# Patient Record
Sex: FEMALE | Race: WHITE | NOT HISPANIC OR LATINO | Employment: OTHER | URBAN - METROPOLITAN AREA
[De-identification: names, ages, dates, MRNs, and addresses within clinical notes are randomized per-mention and may not be internally consistent; named-entity substitution may affect disease eponyms.]

---

## 2020-11-17 ENCOUNTER — HOSPITAL ENCOUNTER (OUTPATIENT)
Facility: MEDICAL CENTER | Age: 85
End: 2020-11-17
Attending: ORTHOPAEDIC SURGERY | Admitting: ORTHOPAEDIC SURGERY
Payer: MEDICARE

## 2021-01-14 DIAGNOSIS — Z23 NEED FOR VACCINATION: ICD-10-CM

## 2021-02-01 ENCOUNTER — APPOINTMENT (RX ONLY)
Dept: URBAN - METROPOLITAN AREA CLINIC 4 | Facility: CLINIC | Age: 86
Setting detail: DERMATOLOGY
End: 2021-02-01

## 2021-02-01 DIAGNOSIS — D22 MELANOCYTIC NEVI: ICD-10-CM

## 2021-02-01 DIAGNOSIS — L82.1 OTHER SEBORRHEIC KERATOSIS: ICD-10-CM

## 2021-02-01 DIAGNOSIS — L81.4 OTHER MELANIN HYPERPIGMENTATION: ICD-10-CM

## 2021-02-01 DIAGNOSIS — L91.8 OTHER HYPERTROPHIC DISORDERS OF THE SKIN: ICD-10-CM

## 2021-02-01 DIAGNOSIS — Z85.828 PERSONAL HISTORY OF OTHER MALIGNANT NEOPLASM OF SKIN: ICD-10-CM

## 2021-02-01 DIAGNOSIS — L72.0 EPIDERMAL CYST: ICD-10-CM

## 2021-02-01 PROBLEM — D22.5 MELANOCYTIC NEVI OF TRUNK: Status: ACTIVE | Noted: 2021-02-01

## 2021-02-01 PROCEDURE — 99203 OFFICE O/P NEW LOW 30 MIN: CPT

## 2021-02-01 PROCEDURE — ? LIQUID NITROGEN (COSMETIC)

## 2021-02-01 PROCEDURE — ? COUNSELING

## 2021-02-01 PROCEDURE — ? ADDITIONAL NOTES

## 2021-02-01 ASSESSMENT — LOCATION DETAILED DESCRIPTION DERM
LOCATION DETAILED: LEFT SUPERIOR MEDIAL MALAR CHEEK
LOCATION DETAILED: RIGHT INFERIOR ANTERIOR NECK
LOCATION DETAILED: RIGHT INFERIOR MEDIAL MALAR CHEEK
LOCATION DETAILED: UPPER STERNUM
LOCATION DETAILED: LEFT FOREHEAD
LOCATION DETAILED: RIGHT MEDIAL MALAR CHEEK
LOCATION DETAILED: LEFT INFERIOR MEDIAL MIDBACK
LOCATION DETAILED: LEFT PROXIMAL DORSAL FOREARM
LOCATION DETAILED: LEFT CENTRAL MALAR CHEEK
LOCATION DETAILED: LEFT INFERIOR CENTRAL MALAR CHEEK
LOCATION DETAILED: LEFT SUPERIOR MEDIAL MIDBACK
LOCATION DETAILED: NASAL SUPRATIP
LOCATION DETAILED: RIGHT PROXIMAL DORSAL FOREARM
LOCATION DETAILED: LEFT PROXIMAL POSTERIOR UPPER ARM
LOCATION DETAILED: LEFT SUPERIOR LATERAL MALAR CHEEK
LOCATION DETAILED: RIGHT INFERIOR LATERAL NECK
LOCATION DETAILED: RIGHT SUPERIOR LATERAL LOWER BACK
LOCATION DETAILED: RIGHT SUPERIOR CENTRAL MALAR CHEEK
LOCATION DETAILED: RIGHT PROXIMAL POSTERIOR UPPER ARM

## 2021-02-01 ASSESSMENT — LOCATION SIMPLE DESCRIPTION DERM
LOCATION SIMPLE: RIGHT POSTERIOR UPPER ARM
LOCATION SIMPLE: NOSE
LOCATION SIMPLE: RIGHT LOWER BACK
LOCATION SIMPLE: RIGHT ANTERIOR NECK
LOCATION SIMPLE: RIGHT FOREARM
LOCATION SIMPLE: LEFT LOWER BACK
LOCATION SIMPLE: LEFT POSTERIOR UPPER ARM
LOCATION SIMPLE: LEFT CHEEK
LOCATION SIMPLE: RIGHT CHEEK
LOCATION SIMPLE: LEFT FOREARM
LOCATION SIMPLE: LEFT FOREHEAD
LOCATION SIMPLE: CHEST

## 2021-02-01 ASSESSMENT — LOCATION ZONE DERM
LOCATION ZONE: NOSE
LOCATION ZONE: TRUNK
LOCATION ZONE: NECK
LOCATION ZONE: ARM
LOCATION ZONE: FACE

## 2021-02-01 NOTE — HPI: SKIN LESION
Is This A New Presentation, Or A Follow-Up?: Skin Lesions
What Type Of Note Output Would You Prefer (Optional)?: Standard Output
How Severe Is Your Skin Lesion?: mild
Has Your Skin Lesion Been Treated?: not been treated
Is This A New Presentation, Or A Follow-Up?: Growths

## 2021-02-01 NOTE — PROCEDURE: ADDITIONAL NOTES
Render Risk Assessment In Note?: no
Detail Level: Detailed
Additional Notes: Discussed in detail benign diagnosis. \\nReassure and observe for any changes. \\nIncludes spot of concern mentioned on intake.
Additional Notes: Includes spot of concern mentioned on intake.
Additional Notes: Will treat with LN2 today. \\nDiscussed treatment and risks in detail with patient.

## 2021-07-13 PROBLEM — G56.02 CARPAL TUNNEL SYNDROME ON LEFT: Status: ACTIVE | Noted: 2021-07-13

## 2021-07-13 PROBLEM — M65.342 TRIGGER FINGER, LEFT RING FINGER: Status: ACTIVE | Noted: 2021-07-13

## 2021-07-13 PROBLEM — M65.332 TRIGGER FINGER, LEFT MIDDLE FINGER: Status: ACTIVE | Noted: 2021-07-13

## 2021-07-27 ENCOUNTER — TELEPHONE (OUTPATIENT)
Dept: SURGERY | Facility: MEDICAL CENTER | Age: 86
End: 2021-07-27

## 2021-11-18 ENCOUNTER — TELEPHONE (OUTPATIENT)
Dept: HEALTH INFORMATION MANAGEMENT | Facility: OTHER | Age: 86
End: 2021-11-18

## 2021-11-18 NOTE — TELEPHONE ENCOUNTER
Outcome: QSHA/ and an appt to establish care with a new PCP were scheduled.    [facility] Verified: yes

## 2021-12-08 PROBLEM — M65.332 TRIGGER FINGER, LEFT MIDDLE FINGER: Status: RESOLVED | Noted: 2021-07-13 | Resolved: 2021-12-08

## 2021-12-08 PROBLEM — G47.00 INSOMNIA: Status: ACTIVE | Noted: 2021-12-08

## 2021-12-08 PROBLEM — M65.342 TRIGGER FINGER, LEFT RING FINGER: Status: RESOLVED | Noted: 2021-07-13 | Resolved: 2021-12-08

## 2021-12-08 PROBLEM — I10 HYPERTENSION: Status: ACTIVE | Noted: 2021-12-08

## 2021-12-08 PROBLEM — M35.3 POLYMYALGIA RHEUMATICA (HCC): Status: ACTIVE | Noted: 2021-12-08

## 2021-12-08 PROBLEM — K21.9 GERD (GASTROESOPHAGEAL REFLUX DISEASE): Status: ACTIVE | Noted: 2021-12-08

## 2021-12-08 PROBLEM — R94.4 ABNORMAL RESULTS OF KIDNEY FUNCTION STUDIES: Status: ACTIVE | Noted: 2021-12-08

## 2021-12-08 PROBLEM — J44.9 CHRONIC OBSTRUCTIVE PULMONARY DISEASE (COPD) (HCC): Status: ACTIVE | Noted: 2021-12-08

## 2021-12-08 PROBLEM — J44.9 CHRONIC OBSTRUCTIVE PULMONARY DISEASE (HCC): Status: ACTIVE | Noted: 2021-12-08

## 2021-12-08 PROBLEM — E66.9 OBESITY (BMI 30.0-34.9): Status: ACTIVE | Noted: 2021-12-08

## 2021-12-08 PROBLEM — I70.0 ATHEROSCLEROSIS OF AORTA (HCC): Status: ACTIVE | Noted: 2021-12-08

## 2021-12-08 PROBLEM — E66.811 OBESITY (BMI 30.0-34.9): Status: ACTIVE | Noted: 2021-12-08

## 2021-12-08 PROBLEM — G56.02 CARPAL TUNNEL SYNDROME OF LEFT WRIST: Status: RESOLVED | Noted: 2021-07-13 | Resolved: 2021-12-08

## 2021-12-08 PROBLEM — M54.9 CHRONIC BILATERAL BACK PAIN: Status: ACTIVE | Noted: 2021-12-08

## 2021-12-08 PROBLEM — G31.9 CEREBRAL ATROPHY (HCC): Status: ACTIVE | Noted: 2021-12-08

## 2021-12-08 PROBLEM — G89.29 CHRONIC BILATERAL BACK PAIN: Status: ACTIVE | Noted: 2021-12-08

## 2021-12-08 PROBLEM — F13.20 SEDATIVE HYPNOTIC OR ANXIOLYTIC DEPENDENCE (HCC): Status: ACTIVE | Noted: 2021-12-08

## 2021-12-24 ENCOUNTER — APPOINTMENT (OUTPATIENT)
Dept: RADIOLOGY | Facility: MEDICAL CENTER | Age: 86
End: 2021-12-24
Attending: EMERGENCY MEDICINE
Payer: MEDICARE

## 2021-12-24 ENCOUNTER — HOSPITAL ENCOUNTER (OUTPATIENT)
Facility: MEDICAL CENTER | Age: 86
End: 2021-12-25
Attending: EMERGENCY MEDICINE | Admitting: INTERNAL MEDICINE
Payer: MEDICARE

## 2021-12-24 DIAGNOSIS — R07.9 ACUTE CHEST PAIN: ICD-10-CM

## 2021-12-24 DIAGNOSIS — J98.4 PNEUMONITIS: ICD-10-CM

## 2021-12-24 PROBLEM — R10.13 EPIGASTRIC ABDOMINAL PAIN: Status: ACTIVE | Noted: 2021-12-24

## 2021-12-24 PROBLEM — E87.6 HYPOKALEMIA: Status: ACTIVE | Noted: 2021-12-24

## 2021-12-24 PROBLEM — D72.829 LEUKOCYTOSIS: Status: ACTIVE | Noted: 2021-12-24

## 2021-12-24 LAB
ALBUMIN SERPL BCP-MCNC: 4.4 G/DL (ref 3.2–4.9)
ALBUMIN/GLOB SERPL: 1.6 G/DL
ALP SERPL-CCNC: 84 U/L (ref 30–99)
ALT SERPL-CCNC: 24 U/L (ref 2–50)
ANION GAP SERPL CALC-SCNC: 15 MMOL/L (ref 7–16)
ANISOCYTOSIS BLD QL SMEAR: ABNORMAL
AST SERPL-CCNC: 31 U/L (ref 12–45)
BASOPHILS # BLD AUTO: 0 % (ref 0–1.8)
BASOPHILS # BLD: 0 K/UL (ref 0–0.12)
BILIRUB SERPL-MCNC: 1.2 MG/DL (ref 0.1–1.5)
BUN SERPL-MCNC: 20 MG/DL (ref 8–22)
CALCIUM SERPL-MCNC: 9.9 MG/DL (ref 8.5–10.5)
CHLORIDE SERPL-SCNC: 97 MMOL/L (ref 96–112)
CO2 SERPL-SCNC: 26 MMOL/L (ref 20–33)
CREAT SERPL-MCNC: 0.82 MG/DL (ref 0.5–1.4)
EKG IMPRESSION: NORMAL
EOSINOPHIL # BLD AUTO: 0.14 K/UL (ref 0–0.51)
EOSINOPHIL NFR BLD: 0.9 % (ref 0–6.9)
ERYTHROCYTE [DISTWIDTH] IN BLOOD BY AUTOMATED COUNT: 56.3 FL (ref 35.9–50)
GLOBULIN SER CALC-MCNC: 2.7 G/DL (ref 1.9–3.5)
GLUCOSE SERPL-MCNC: 160 MG/DL (ref 65–99)
HCT VFR BLD AUTO: 47.2 % (ref 37–47)
HGB BLD-MCNC: 16 G/DL (ref 12–16)
LIPASE SERPL-CCNC: 43 U/L (ref 11–82)
LYMPHOCYTES # BLD AUTO: 1.48 K/UL (ref 1–4.8)
LYMPHOCYTES NFR BLD: 9.6 % (ref 22–41)
MANUAL DIFF BLD: NORMAL
MCH RBC QN AUTO: 28.7 PG (ref 27–33)
MCHC RBC AUTO-ENTMCNC: 33.9 G/DL (ref 33.6–35)
MCV RBC AUTO: 84.7 FL (ref 81.4–97.8)
METAMYELOCYTES NFR BLD MANUAL: 1.7 %
MICROCYTES BLD QL SMEAR: ABNORMAL
MONOCYTES # BLD AUTO: 0.8 K/UL (ref 0–0.85)
MONOCYTES NFR BLD AUTO: 5.2 % (ref 0–13.4)
MORPHOLOGY BLD-IMP: NORMAL
NEUTROPHILS # BLD AUTO: 12.72 K/UL (ref 2–7.15)
NEUTROPHILS NFR BLD: 82.6 % (ref 44–72)
NRBC # BLD AUTO: 0 K/UL
NRBC BLD-RTO: 0 /100 WBC
PLATELET # BLD AUTO: 336 K/UL (ref 164–446)
PLATELET BLD QL SMEAR: NORMAL
PMV BLD AUTO: 9.8 FL (ref 9–12.9)
POLYCHROMASIA BLD QL SMEAR: NORMAL
POTASSIUM SERPL-SCNC: 3.4 MMOL/L (ref 3.6–5.5)
PROT SERPL-MCNC: 7.1 G/DL (ref 6–8.2)
RBC # BLD AUTO: 5.57 M/UL (ref 4.2–5.4)
RBC BLD AUTO: PRESENT
SODIUM SERPL-SCNC: 138 MMOL/L (ref 135–145)
TROPONIN T SERPL-MCNC: 22 NG/L (ref 6–19)
TROPONIN T SERPL-MCNC: 24 NG/L (ref 6–19)
WBC # BLD AUTO: 15.4 K/UL (ref 4.8–10.8)

## 2021-12-24 PROCEDURE — 94760 N-INVAS EAR/PLS OXIMETRY 1: CPT

## 2021-12-24 PROCEDURE — G0378 HOSPITAL OBSERVATION PER HR: HCPCS

## 2021-12-24 PROCEDURE — 700102 HCHG RX REV CODE 250 W/ 637 OVERRIDE(OP): Performed by: INTERNAL MEDICINE

## 2021-12-24 PROCEDURE — 83690 ASSAY OF LIPASE: CPT

## 2021-12-24 PROCEDURE — 99285 EMERGENCY DEPT VISIT HI MDM: CPT

## 2021-12-24 PROCEDURE — 80053 COMPREHEN METABOLIC PANEL: CPT

## 2021-12-24 PROCEDURE — 700111 HCHG RX REV CODE 636 W/ 250 OVERRIDE (IP): Performed by: EMERGENCY MEDICINE

## 2021-12-24 PROCEDURE — 85027 COMPLETE CBC AUTOMATED: CPT

## 2021-12-24 PROCEDURE — 93005 ELECTROCARDIOGRAM TRACING: CPT

## 2021-12-24 PROCEDURE — 96374 THER/PROPH/DIAG INJ IV PUSH: CPT

## 2021-12-24 PROCEDURE — 84484 ASSAY OF TROPONIN QUANT: CPT

## 2021-12-24 PROCEDURE — 71045 X-RAY EXAM CHEST 1 VIEW: CPT

## 2021-12-24 PROCEDURE — 93005 ELECTROCARDIOGRAM TRACING: CPT | Performed by: EMERGENCY MEDICINE

## 2021-12-24 PROCEDURE — 85007 BL SMEAR W/DIFF WBC COUNT: CPT

## 2021-12-24 PROCEDURE — 99220 PR INITIAL OBSERVATION CARE,LEVL III: CPT | Performed by: INTERNAL MEDICINE

## 2021-12-24 PROCEDURE — 96375 TX/PRO/DX INJ NEW DRUG ADDON: CPT

## 2021-12-24 PROCEDURE — A9270 NON-COVERED ITEM OR SERVICE: HCPCS | Performed by: INTERNAL MEDICINE

## 2021-12-24 PROCEDURE — 700111 HCHG RX REV CODE 636 W/ 250 OVERRIDE (IP): Performed by: INTERNAL MEDICINE

## 2021-12-24 RX ORDER — ONDANSETRON 2 MG/ML
4 INJECTION INTRAMUSCULAR; INTRAVENOUS EVERY 4 HOURS PRN
Status: DISCONTINUED | OUTPATIENT
Start: 2021-12-24 | End: 2021-12-25 | Stop reason: HOSPADM

## 2021-12-24 RX ORDER — OMEPRAZOLE 20 MG/1
40 CAPSULE, DELAYED RELEASE ORAL 2 TIMES DAILY
Status: DISCONTINUED | OUTPATIENT
Start: 2021-12-24 | End: 2021-12-25 | Stop reason: HOSPADM

## 2021-12-24 RX ORDER — ONDANSETRON 2 MG/ML
4 INJECTION INTRAMUSCULAR; INTRAVENOUS ONCE
Status: COMPLETED | OUTPATIENT
Start: 2021-12-24 | End: 2021-12-24

## 2021-12-24 RX ORDER — POTASSIUM CHLORIDE 20 MEQ/1
40 TABLET, EXTENDED RELEASE ORAL ONCE
Status: COMPLETED | OUTPATIENT
Start: 2021-12-24 | End: 2021-12-24

## 2021-12-24 RX ORDER — MORPHINE SULFATE 4 MG/ML
4 INJECTION INTRAVENOUS ONCE
Status: COMPLETED | OUTPATIENT
Start: 2021-12-24 | End: 2021-12-24

## 2021-12-24 RX ORDER — LOVASTATIN 20 MG/1
40 TABLET ORAL
Status: DISCONTINUED | OUTPATIENT
Start: 2021-12-24 | End: 2021-12-25 | Stop reason: HOSPADM

## 2021-12-24 RX ORDER — GABAPENTIN 100 MG/1
200 CAPSULE ORAL 2 TIMES DAILY
COMMUNITY

## 2021-12-24 RX ORDER — ALBUTEROL SULFATE 90 UG/1
2 AEROSOL, METERED RESPIRATORY (INHALATION)
Status: DISCONTINUED | OUTPATIENT
Start: 2021-12-24 | End: 2021-12-25 | Stop reason: HOSPADM

## 2021-12-24 RX ORDER — ONDANSETRON 4 MG/1
4 TABLET, ORALLY DISINTEGRATING ORAL EVERY 4 HOURS PRN
Status: DISCONTINUED | OUTPATIENT
Start: 2021-12-24 | End: 2021-12-25 | Stop reason: HOSPADM

## 2021-12-24 RX ORDER — GABAPENTIN 100 MG/1
200 CAPSULE ORAL 3 TIMES DAILY
Status: DISCONTINUED | OUTPATIENT
Start: 2021-12-24 | End: 2021-12-25 | Stop reason: HOSPADM

## 2021-12-24 RX ORDER — AMOXICILLIN 250 MG
2 CAPSULE ORAL 2 TIMES DAILY
Status: DISCONTINUED | OUTPATIENT
Start: 2021-12-24 | End: 2021-12-25 | Stop reason: HOSPADM

## 2021-12-24 RX ORDER — ACETAMINOPHEN 325 MG/1
650 TABLET ORAL EVERY 6 HOURS PRN
Status: DISCONTINUED | OUTPATIENT
Start: 2021-12-24 | End: 2021-12-25 | Stop reason: HOSPADM

## 2021-12-24 RX ORDER — LOSARTAN POTASSIUM 50 MG/1
50 TABLET ORAL DAILY
Status: DISCONTINUED | OUTPATIENT
Start: 2021-12-25 | End: 2021-12-25 | Stop reason: HOSPADM

## 2021-12-24 RX ORDER — POLYETHYLENE GLYCOL 3350 17 G/17G
1 POWDER, FOR SOLUTION ORAL
Status: DISCONTINUED | OUTPATIENT
Start: 2021-12-24 | End: 2021-12-25 | Stop reason: HOSPADM

## 2021-12-24 RX ORDER — PREDNISONE 10 MG/1
5 TABLET ORAL 2 TIMES DAILY
Status: DISCONTINUED | OUTPATIENT
Start: 2021-12-24 | End: 2021-12-25 | Stop reason: HOSPADM

## 2021-12-24 RX ORDER — BISACODYL 10 MG
10 SUPPOSITORY, RECTAL RECTAL
Status: DISCONTINUED | OUTPATIENT
Start: 2021-12-24 | End: 2021-12-25 | Stop reason: HOSPADM

## 2021-12-24 RX ORDER — HYDROCHLOROTHIAZIDE 25 MG/1
25 TABLET ORAL DAILY
Status: DISCONTINUED | OUTPATIENT
Start: 2021-12-25 | End: 2021-12-25 | Stop reason: HOSPADM

## 2021-12-24 RX ADMIN — PREDNISONE 5 MG: 10 TABLET ORAL at 17:42

## 2021-12-24 RX ADMIN — OMEPRAZOLE 40 MG: 20 CAPSULE, DELAYED RELEASE ORAL at 17:42

## 2021-12-24 RX ADMIN — GABAPENTIN 200 MG: 100 CAPSULE ORAL at 17:42

## 2021-12-24 RX ADMIN — ONDANSETRON 4 MG: 2 INJECTION INTRAMUSCULAR; INTRAVENOUS at 12:40

## 2021-12-24 RX ADMIN — POTASSIUM CHLORIDE 40 MEQ: 1500 TABLET, EXTENDED RELEASE ORAL at 17:43

## 2021-12-24 RX ADMIN — FAMOTIDINE 20 MG: 10 INJECTION INTRAVENOUS at 12:40

## 2021-12-24 RX ADMIN — MORPHINE SULFATE 4 MG: 4 INJECTION INTRAVENOUS at 12:41

## 2021-12-24 RX ADMIN — LOVASTATIN 40 MG: 20 TABLET ORAL at 20:55

## 2021-12-24 ASSESSMENT — PATIENT HEALTH QUESTIONNAIRE - PHQ9
1. LITTLE INTEREST OR PLEASURE IN DOING THINGS: NOT AT ALL
2. FEELING DOWN, DEPRESSED, IRRITABLE, OR HOPELESS: NOT AT ALL
SUM OF ALL RESPONSES TO PHQ9 QUESTIONS 1 AND 2: 0

## 2021-12-24 ASSESSMENT — ENCOUNTER SYMPTOMS
SHORTNESS OF BREATH: 0
LOSS OF CONSCIOUSNESS: 0
INSOMNIA: 1
EYE REDNESS: 0
FALLS: 0
FEVER: 0
MEMORY LOSS: 0
ABDOMINAL PAIN: 0
COUGH: 0
VOMITING: 1
PALPITATIONS: 0
NAUSEA: 1
SEIZURES: 0
EYE PAIN: 0
DIARRHEA: 0
CHILLS: 0

## 2021-12-24 ASSESSMENT — LIFESTYLE VARIABLES
EVER HAD A DRINK FIRST THING IN THE MORNING TO STEADY YOUR NERVES TO GET RID OF A HANGOVER: NO
TOTAL SCORE: 0
TOTAL SCORE: 0
AVERAGE NUMBER OF DAYS PER WEEK YOU HAVE A DRINK CONTAINING ALCOHOL: 0
HOW MANY TIMES IN THE PAST YEAR HAVE YOU HAD 5 OR MORE DRINKS IN A DAY: 0
EVER FELT BAD OR GUILTY ABOUT YOUR DRINKING: NO
HAVE YOU EVER FELT YOU SHOULD CUT DOWN ON YOUR DRINKING: NO
CONSUMPTION TOTAL: NEGATIVE
TOTAL SCORE: 0
HAVE PEOPLE ANNOYED YOU BY CRITICIZING YOUR DRINKING: NO
ALCOHOL_USE: NO
ON A TYPICAL DAY WHEN YOU DRINK ALCOHOL HOW MANY DRINKS DO YOU HAVE: 0

## 2021-12-24 ASSESSMENT — PAIN DESCRIPTION - PAIN TYPE
TYPE: CHRONIC PAIN
TYPE: ACUTE PAIN
TYPE: CHRONIC PAIN

## 2021-12-24 ASSESSMENT — HEART SCORE
HISTORY: MODERATELY SUSPICIOUS
RISK FACTORS: 1-2 RISK FACTORS
ECG: NON-SPECIFIC REPOLARIZATION DISTURBANCE
TROPONIN: 1-3 TIMES NORMAL LIMIT
HEART SCORE: 6
AGE: 65+

## 2021-12-24 ASSESSMENT — FIBROSIS 4 INDEX: FIB4 SCORE: 1.66

## 2021-12-24 NOTE — H&P
"Hospital Medicine History & Physical Note    Date of Service  12/24/2021    Primary Care Physician  Darien Herron M.D.    Code Status  DNAR/DNI    Chief Complaint  Chief Complaint   Patient presents with   • Chest Pain     BIBA from home after patient was eating ceral and began to have chest pain under her left breast that radiates under both sides of ribs. It is re-produceable with palpation and inspiration. She did vomit 2-3x en route and was given 6.25mg Phenergan by EMS       History of Presenting Illness  Iveth Martin is a 88 y.o. female who presented 12/24/2021 with epigastric pain.  Medical history is significant for polymyalgia rheumatica on chronic steroids, GERD, HTN, hiatal hernia.  Patient reports onset of epigastric pain while eating cereal this morning.  Patient localizes pain to the mid epigastric area and points to her diaphragm.  Pain is reported as a \"tightness\" and radiates laterally. Patient denies associated shortness of breath or diaphoresis but did vomit several times since pain onset. Patient does report possible aspiration during vomiting which is described as non bloody, non bilious. Denies sick contacts.   Upon presentation to the ED. Presenting vitals: /83, HR 87, RR 20, T 97.2. Initial laboratory evaluation demonstrated WBC 15.4, Troponin 22. CXR with opacities consistent with atelectasis vs pneumonitis. EKG with no ST/T abnormalities.     Review of Systems  Review of Systems   Constitutional: Negative for chills and fever.   HENT: Negative for congestion.    Eyes: Negative for pain and redness.   Respiratory: Negative for cough and shortness of breath.    Cardiovascular: Negative for chest pain and palpitations.   Gastrointestinal: Positive for nausea and vomiting. Negative for abdominal pain and diarrhea.   Genitourinary: Negative for dysuria and frequency.   Musculoskeletal: Negative for falls.   Skin: Negative for rash.   Neurological: Negative for seizures and loss of " "consciousness.   Psychiatric/Behavioral: Negative for memory loss. The patient has insomnia.        Past Medical History   has a past medical history of Hyperlipidemia and Hypertension.    Surgical History   has a past surgical history that includes pr wrist arthroscop,release xvers lig (Left, 2021) and pb incise finger tendon sheath (Left, 2021).     Family History  family history includes Cancer in her father; Heart Disease in her father.   Family history reviewed with patient. There is family history that is pertinent to the chief complaint.     Social History   reports that she quit smoking about 36 years ago. Her smoking use included cigarettes. She has a 30.00 pack-year smoking history. She has never used smokeless tobacco. She reports current alcohol use. She reports that she does not use drugs.    Allergies  Allergies   Allergen Reactions   • Celebrex [Celecoxib] Unspecified     GERD reaction   • Lisinopril Cough   • Nsaids Unspecified     GERD reaction   • Other Drug Unspecified     bioxin   • Benadryl Allergy Unspecified     \"tongue gets fuzzy and feel like I am drugged\"       Medications  Prior to Admission Medications   Prescriptions Last Dose Informant Patient Reported? Taking?   Cholecalciferol (VITAMIN D3) 2000 UNIT Cap   Yes No   Sig: Take 1 Capsule by mouth every day.   Dextromethorphan-Guaifenesin (MUCINEX DM PO)   Yes No   Sig: Take  by mouth.     OMEPRAZOLE PO   Yes No   Sig: Take 40 mg by mouth 2 (two) times a day.   albuterol 108 (90 Base) MCG/ACT Aero Soln inhalation aerosol   Yes No   Sig: INHALE 2 PUFFS BY INHALATION ROUTE EVERY 4-6 HRS AS NEEDED   chlorpheniramine (CHLOR-TRIMETRON) 4 MG Tab   Yes No   Sig: Take 4 mg by mouth every day. Second dose in the evening PRN   gabapentin (NEURONTIN) 100 MG Cap   No No   Si po qid   Patient taking differently: 200 mg in the morning, at noon, and at bedtime. 2 po qid   hydroCHLOROthiazide (HYDRODIURIL) 25 MG Tab   Yes No   Sig: every " day.   losartan (COZAAR) 50 MG Tab   Yes No   Sig: Take 50 mg by mouth every day.   lovastatin (MEVACOR) 40 MG tablet   Yes No   Sig: TAKE 1 TABLET BY MOUTH EVERY EVENING WITH THE EVENING MEAL   predniSONE (DELTASONE) 5 MG Tab   Yes No   Sig: Take 5 mg by mouth in the morning, at noon, and at bedtime.   zolpidem (AMBIEN) 5 MG Tab   Yes No   Sig: Take  by mouth at bedtime. Take 1 tablet by mouth every night at bedtime as needed      Facility-Administered Medications: None       Physical Exam  Temp:  [36.2 °C (97.2 °F)] 36.2 °C (97.2 °F)  Pulse:  [81-87] 85  Resp:  [17-20] 20  BP: (124-168)/(71-83) 124/75  SpO2:  [90 %-95 %] 92 %  Blood Pressure : (!) 168/83   Temperature: 36.2 °C (97.2 °F)   Pulse: 87   Respiration: 20   Pulse Oximetry: 95 %       Physical Exam  Constitutional:       General: She is not in acute distress.     Appearance: She is not toxic-appearing or diaphoretic.      Comments: Pleasant, Conversational   HENT:      Right Ear: External ear normal.      Left Ear: External ear normal.      Nose: No congestion.      Mouth/Throat:      Mouth: Mucous membranes are moist.      Pharynx: No oropharyngeal exudate.   Eyes:      General: No scleral icterus.     Pupils: Pupils are equal, round, and reactive to light.   Cardiovascular:      Rate and Rhythm: Normal rate.      Heart sounds: No murmur heard.      Pulmonary:      Breath sounds: No wheezing.   Abdominal:      Palpations: Abdomen is soft.      Tenderness: There is no abdominal tenderness. There is no guarding or rebound.   Musculoskeletal:         General: No swelling or deformity.   Skin:     Coloration: Skin is not jaundiced.      Findings: No bruising.   Neurological:      General: No focal deficit present.      Mental Status: She is alert and oriented to person, place, and time.   Psychiatric:         Mood and Affect: Mood normal.         Behavior: Behavior normal.         Thought Content: Thought content normal.         Judgment: Judgment normal.          Laboratory:  Recent Labs     12/24/21  1210   WBC 15.4*   RBC 5.57*   HEMOGLOBIN 16.0   HEMATOCRIT 47.2*   MCV 84.7   MCH 28.7   MCHC 33.9   RDW 56.3*   PLATELETCT 336   MPV 9.8     Recent Labs     12/24/21  1210   SODIUM 138   POTASSIUM 3.4*   CHLORIDE 97   CO2 26   GLUCOSE 160*   BUN 20   CREATININE 0.82   CALCIUM 9.9     Recent Labs     12/24/21  1210   ALTSGPT 24   ASTSGOT 31   ALKPHOSPHAT 84   TBILIRUBIN 1.2   LIPASE 43   GLUCOSE 160*         No results for input(s): NTPROBNP in the last 72 hours.      Recent Labs     12/24/21  1210   TROPONINT 22*       Imaging:  DX-CHEST-PORTABLE (1 VIEW)   Final Result      1.  Linear bibasilar opacities likely due to atelectasis with potential pneumonitis on the right.          X-Ray:  I have personally reviewed the images and compared with prior images.  EKG:  I have personally reviewed the images and compared with prior images.    Assessment/Plan:  I anticipate this patient is appropriate for observation status at this time.    * Epigastric abdominal pain- (present on admission)  Assessment & Plan  Mid, epigastric pain with onset during eating. Followed by episodes of nausea and vomiting  History of hiatal hernia, GERD  EKG with no st/t abnormalities  Troponin 22 - obtain repeat  GI cocktail PRN    Leukocytosis  Assessment & Plan  Possibly secondary to WBC demargination after aspiration event  Not requiring supplemental oxygen  monitor    Chronic obstructive pulmonary disease (HCC)- (present on admission)  Assessment & Plan  Diagnosed 1999  Continue albuterol      Atherosclerosis of aorta (HCC)- (present on admission)  Assessment & Plan  Per previous imaging  Continue statin    Polymyalgia rheumatica (HCC)- (present on admission)  Assessment & Plan  Chronic steroids  Currently tapering in setting of downtrending CRP  Prednisone 5mg BID    Hypokalemia  Assessment & Plan  Monitor and replace    Hypertension- (present on admission)  Assessment &  Plan  HCTZ  ARB    GERD (gastroesophageal reflux disease)- (present on admission)  Assessment & Plan  PPI    Obesity (BMI 30.0-34.9)- (present on admission)  Assessment & Plan  Due to excess caloric intake      VTE prophylaxis: enoxaparin ppx

## 2021-12-24 NOTE — ASSESSMENT & PLAN NOTE
Possibly secondary to WBC demargination after aspiration event  Not requiring supplemental oxygen  monitor

## 2021-12-24 NOTE — ED TRIAGE NOTES
Chief Complaint   Patient presents with   • Chest Pain     BIBA from home after patient was eating ceral and began to have chest pain under her left breast that radiates under both sides of ribs. It is re-produceable with palpation and inspiration. She did vomit 2-3x en route and was given 6.25mg Phenergan by EMS

## 2021-12-24 NOTE — ED PROVIDER NOTES
ED Provider Note  CHIEF COMPLAINT  Chief Complaint   Patient presents with   • Chest Pain     BIBA from home after patient was eating ceral and began to have chest pain under her left breast that radiates under both sides of ribs. It is re-produceable with palpation and inspiration. She did vomit 2-3x en route and was given 6.25mg Phenergan by EMS       HPI  Iveth Martin is a 88 y.o. female who presents with chest pain.  Patient was eating cereal this morning when she developed a sudden pain in her epigastric area.  It began in her left breast and radiates to both sides.  She thought that her bra might have been too tight and therefore removed it but it did not relieve the pain.  She has had 3 episodes of vomiting prior to arrival and one episode of vomiting here.  She denies previous history of heart problems.  She does have a history of GERD and a ulcer as well as a hiatal hernia.  She not had any shortness of breath or fevers.  No leg swelling.  The pain in her epigastric area does not radiate to her neck or back.  No leg swelling.  She has a history of appendectomy, umbilical hernia repair and hysterectomy.  Her pain is slightly worse when she takes a deep breath.  No recent fevers or difficulty breathing.    REVIEW OF SYSTEMS  See HPI for further details. All other systems are negative.     PAST MEDICAL HISTORY  Past Medical History:   Diagnosis Date   • Hyperlipidemia    • Hypertension        FAMILY HISTORY  [unfilled]    SOCIAL HISTORY  Social History     Socioeconomic History   • Marital status:      Spouse name: Not on file   • Number of children: Not on file   • Years of education: Not on file   • Highest education level: Not on file   Occupational History   • Not on file   Tobacco Use   • Smoking status: Former Smoker     Packs/day: 1.00     Years: 30.00     Pack years: 30.00     Types: Cigarettes     Quit date:      Years since quittin.0   • Smokeless tobacco: Never Used   Vaping Use   •  Vaping Use: Never used   Substance and Sexual Activity   • Alcohol use: Yes     Comment: rare   • Drug use: No   • Sexual activity: Not on file   Other Topics Concern   • Not on file   Social History Narrative   • Not on file     Social Determinants of Health     Financial Resource Strain:    • Difficulty of Paying Living Expenses: Not on file   Food Insecurity:    • Worried About Running Out of Food in the Last Year: Not on file   • Ran Out of Food in the Last Year: Not on file   Transportation Needs:    • Lack of Transportation (Medical): Not on file   • Lack of Transportation (Non-Medical): Not on file   Physical Activity:    • Days of Exercise per Week: Not on file   • Minutes of Exercise per Session: Not on file   Stress:    • Feeling of Stress : Not on file   Social Connections:    • Frequency of Communication with Friends and Family: Not on file   • Frequency of Social Gatherings with Friends and Family: Not on file   • Attends Jainism Services: Not on file   • Active Member of Clubs or Organizations: Not on file   • Attends Club or Organization Meetings: Not on file   • Marital Status: Not on file   Intimate Partner Violence:    • Fear of Current or Ex-Partner: Not on file   • Emotionally Abused: Not on file   • Physically Abused: Not on file   • Sexually Abused: Not on file   Housing Stability:    • Unable to Pay for Housing in the Last Year: Not on file   • Number of Places Lived in the Last Year: Not on file   • Unstable Housing in the Last Year: Not on file       SURGICAL HISTORY  Past Surgical History:   Procedure Laterality Date   • PB WRIST ARTHROSCOP,RELEASE XVERS LIG Left 8/11/2021    Procedure: LEFT ENDOSCOPIC CARPAL TUNNEL RELEASE;  Surgeon: Pee Khan M.D.;  Location: [facility];  Service: Orthopedics   • PB INCISE FINGER TENDON SHEATH Left 8/11/2021    Procedure: LEFT LONG AND RING TRIGGER FINGER RELEASE;  Surgeon: Pee Khan M.D.;  Location: [facility];  Service: Orthopedics       CURRENT MEDICATIONS   Home Medications    **Home medications have not yet been reviewed for this encounter**         ALLERGIES  Allergies   Allergen Reactions   • Benadryl Allergy      \"tongue gets fuzzy and feel like I am drugged\"   • Celebrex [Celecoxib]    • Lisinopril Cough   • Nsaids    • Other Drug      bioxin       PHYSICAL EXAM  VITAL SIGNS: BP (!) 168/83   Pulse 87   Temp 36.2 °C (97.2 °F)   Resp 20   Ht 1.6 m (5' 3\")   Wt 80.3 kg (177 lb)   SpO2 95%   BMI 31.35 kg/m²       Constitutional: Well developed, mild acute distress, Non-toxic appearance.   HENT: Normocephalic, Atraumatic, Bilateral external ears normal, Nose normal.   Eyes: PERRL, EOMI, Conjunctiva normal  Neck: Normal range of motion, No tenderness, Supple  Cardiovascular: Normal heart rate, Normal rhythm  Thorax & Lungs: Normal breath sounds, No respiratory distress, No wheezing, No chest tenderness.   Abdomen: epigastric tenderness, no guarding no rebound,  no pulsatile mass,  no distention  Skin: Warm, Dry, No erythema, No rash.   Back: No tenderness, No CVA tenderness.   Extremities: Intact distal pulses, No edema, No tenderness   Neurologic: Alert & oriented x 3, Normal motor function, Normal sensory function, No focal deficits noted.   Psychiatric: appropriate      Labs  Results for orders placed or performed during the hospital encounter of 12/24/21   CBC with Differential   Result Value Ref Range    WBC 15.4 (H) 4.8 - 10.8 K/uL    RBC 5.57 (H) 4.20 - 5.40 M/uL    Hemoglobin 16.0 12.0 - 16.0 g/dL    Hematocrit 47.2 (H) 37.0 - 47.0 %    MCV 84.7 81.4 - 97.8 fL    MCH 28.7 27.0 - 33.0 pg    MCHC 33.9 33.6 - 35.0 g/dL    RDW 56.3 (H) 35.9 - 50.0 fL    Platelet Count 336 164 - 446 K/uL    MPV 9.8 9.0 - 12.9 fL    Neutrophils-Polys 82.60 (H) 44.00 - 72.00 %    Lymphocytes 9.60 (L) 22.00 - 41.00 %    Monocytes 5.20 0.00 - 13.40 %    Eosinophils 0.90 0.00 - 6.90 %    Basophils 0.00 " 0.00 - 1.80 %    Nucleated RBC 0.00 /100 WBC    Neutrophils (Absolute) 12.72 (H) 2.00 - 7.15 K/uL    Lymphs (Absolute) 1.48 1.00 - 4.80 K/uL    Monos (Absolute) 0.80 0.00 - 0.85 K/uL    Eos (Absolute) 0.14 0.00 - 0.51 K/uL    Baso (Absolute) 0.00 0.00 - 0.12 K/uL    NRBC (Absolute) 0.00 K/uL    Anisocytosis 1+     Microcytosis 1+    Complete Metabolic Panel (CMP)   Result Value Ref Range    Sodium 138 135 - 145 mmol/L    Potassium 3.4 (L) 3.6 - 5.5 mmol/L    Chloride 97 96 - 112 mmol/L    Co2 26 20 - 33 mmol/L    Anion Gap 15.0 7.0 - 16.0    Glucose 160 (H) 65 - 99 mg/dL    Bun 20 8 - 22 mg/dL    Creatinine 0.82 0.50 - 1.40 mg/dL    Calcium 9.9 8.5 - 10.5 mg/dL    AST(SGOT) 31 12 - 45 U/L    ALT(SGPT) 24 2 - 50 U/L    Alkaline Phosphatase 84 30 - 99 U/L    Total Bilirubin 1.2 0.1 - 1.5 mg/dL    Albumin 4.4 3.2 - 4.9 g/dL    Total Protein 7.1 6.0 - 8.2 g/dL    Globulin 2.7 1.9 - 3.5 g/dL    A-G Ratio 1.6 g/dL   Troponin   Result Value Ref Range    Troponin T 22 (H) 6 - 19 ng/L   DIFFERENTIAL MANUAL   Result Value Ref Range    Metamyelocytes 1.70 %    Manual Diff Status PERFORMED    PERIPHERAL SMEAR REVIEW   Result Value Ref Range    Peripheral Smear Review see below    PLATELET ESTIMATE   Result Value Ref Range    Plt Estimation Normal    MORPHOLOGY   Result Value Ref Range    RBC Morphology Present     Polychromia 1+    ESTIMATED GFR   Result Value Ref Range    GFR If African American >60 >60 mL/min/1.73 m 2    GFR If Non African American >60 >60 mL/min/1.73 m 2   LIPASE   Result Value Ref Range    Lipase 43 11 - 82 U/L   EKG   Result Value Ref Range    Report       [facility] Emergency Dept.    Test Date:  2021  Pt Name:    AMISH KOCH                 Department: ER  MRN:        [id number]                      Room:       RD 04  Gender:     Female                       Technician: [id number]  :        1933-04-10                   Requested By:ER TRIAGE PROTOCOL  Order #:    [id number]                     Reading MD: Amairani Ramirez MD    Measurements  Intervals                                Axis  Rate:       87                           P:          27  ME:         152                          QRS:        16  QRSD:       84                           T:          29  QT:         372  QTc:        448    Interpretive Statements  SINUS RHYTHM  Compared to ECG 12/12/2007 12:20:09  No significant changes  Electronically Signed On 12- 13:43:40 PST by Amairani Ramirez MD         RADIOLOGY/PROCEDURES  DX-CHEST-PORTABLE (1 VIEW)   Final Result      1.  Linear bibasilar opacities likely due to atelectasis with potential pneumonitis on the right.          COURSE & MEDICAL DECISION MAKING  Pertinent Labs & Imaging studies reviewed. (See chart for details)  Patient presents to the emergency department with epigastric/chest pain.  Started after eating cereal.  History of GERD with ulcer and hiatal hernia.  No previous history of cardiac problems but has never had a stress test.  No exertional component to her chest pain today.  No shortness of breath.  Has had a couple episodes of vomiting here.  No recent fevers or cough to suggest an infectious etiology.  No abdominal distention and I doubt bowel obstruction.  Had a bowel movement last night.  No peritoneal signs on abdominal exam.  Will evaluate for possible pancreatitis versus gastritis versus cholecystitis.  We will also evaluate a cardiac etiology.  I do not have any suspicion for PE.  She not have any respiratory symptoms and she is not hypoxic.    Heart score 6.    Laboratory studies returned and show an elevated white count.  Chest x-ray shows a bibasilar opacity that could suggest a pneumonitis.  Patient does think that she may have aspirated when she was throwing up.  Her oxygen levels have been in the low 90s.  Concerning the patient's epigastric/chest pain she does have an elevated heart score and a minimally elevated troponin.  I do not see any  previous ultrasound or stress test of the patient's heart.  Although I am still concerned about a GI etiology for her epigastric pain I also feel trending of her troponins and possibly a further cardiac work-up is indicated.  I also feel she needs to be monitored concerning her possible pneumonitis from her vomiting to make sure she does not have any desaturation.    Discussed the case with the hospitalist Dr. Pereyra who will see the patient.    Patient is hospitalized in guarded condition    FINAL IMPRESSION     1. Acute chest pain    2. Pneumonitis             Electronically signed by: Amairani Mccarthy M.D., 12/24/2021 12:29 PM

## 2021-12-24 NOTE — ASSESSMENT & PLAN NOTE
Mid, epigastric pain with onset during eating. Followed by episodes of nausea and vomiting  History of hiatal hernia, GERD  EKG with no st/t abnormalities  Troponin 22 - obtain repeat  GI cocktail PRN

## 2021-12-25 ENCOUNTER — APPOINTMENT (OUTPATIENT)
Dept: RADIOLOGY | Facility: MEDICAL CENTER | Age: 86
End: 2021-12-25
Attending: INTERNAL MEDICINE
Payer: MEDICARE

## 2021-12-25 VITALS
OXYGEN SATURATION: 94 % | HEART RATE: 65 BPM | WEIGHT: 177.47 LBS | SYSTOLIC BLOOD PRESSURE: 135 MMHG | TEMPERATURE: 96.1 F | RESPIRATION RATE: 18 BRPM | HEIGHT: 63 IN | DIASTOLIC BLOOD PRESSURE: 73 MMHG | BODY MASS INDEX: 31.45 KG/M2

## 2021-12-25 PROBLEM — R10.13 EPIGASTRIC ABDOMINAL PAIN: Status: RESOLVED | Noted: 2021-12-24 | Resolved: 2021-12-25

## 2021-12-25 PROBLEM — E87.6 HYPOKALEMIA: Status: RESOLVED | Noted: 2021-12-24 | Resolved: 2021-12-25

## 2021-12-25 LAB — TROPONIN T SERPL-MCNC: 16 NG/L (ref 6–19)

## 2021-12-25 PROCEDURE — G0378 HOSPITAL OBSERVATION PER HR: HCPCS

## 2021-12-25 PROCEDURE — 76705 ECHO EXAM OF ABDOMEN: CPT

## 2021-12-25 PROCEDURE — A9270 NON-COVERED ITEM OR SERVICE: HCPCS | Performed by: INTERNAL MEDICINE

## 2021-12-25 PROCEDURE — 700102 HCHG RX REV CODE 250 W/ 637 OVERRIDE(OP): Performed by: INTERNAL MEDICINE

## 2021-12-25 PROCEDURE — 99217 PR OBSERVATION CARE DISCHARGE: CPT | Performed by: INTERNAL MEDICINE

## 2021-12-25 PROCEDURE — 36415 COLL VENOUS BLD VENIPUNCTURE: CPT

## 2021-12-25 PROCEDURE — 700111 HCHG RX REV CODE 636 W/ 250 OVERRIDE (IP): Performed by: INTERNAL MEDICINE

## 2021-12-25 PROCEDURE — 84484 ASSAY OF TROPONIN QUANT: CPT

## 2021-12-25 RX ADMIN — HYDROCHLOROTHIAZIDE 25 MG: 25 TABLET ORAL at 05:31

## 2021-12-25 RX ADMIN — LOSARTAN POTASSIUM 50 MG: 50 TABLET, FILM COATED ORAL at 05:30

## 2021-12-25 RX ADMIN — GABAPENTIN 200 MG: 100 CAPSULE ORAL at 05:29

## 2021-12-25 RX ADMIN — DOCUSATE SODIUM 50 MG AND SENNOSIDES 8.6 MG 2 TABLET: 8.6; 5 TABLET, FILM COATED ORAL at 05:29

## 2021-12-25 RX ADMIN — OMEPRAZOLE 40 MG: 20 CAPSULE, DELAYED RELEASE ORAL at 05:30

## 2021-12-25 RX ADMIN — PREDNISONE 5 MG: 10 TABLET ORAL at 05:33

## 2021-12-25 ASSESSMENT — PAIN DESCRIPTION - PAIN TYPE
TYPE: ACUTE PAIN
TYPE: ACUTE PAIN

## 2021-12-25 ASSESSMENT — PATIENT HEALTH QUESTIONNAIRE - PHQ9
2. FEELING DOWN, DEPRESSED, IRRITABLE, OR HOPELESS: NOT AT ALL
1. LITTLE INTEREST OR PLEASURE IN DOING THINGS: NOT AT ALL
SUM OF ALL RESPONSES TO PHQ9 QUESTIONS 1 AND 2: 0

## 2021-12-25 NOTE — CARE PLAN
The patient is Stable - Low risk of patient condition declining or worsening         Progress made toward(s) clinical / shift goals:  Pt able to rest this shift. States feeling much better than yesterday. Agrees and understands POC and ask appropriate questions.     Patient is not progressing towards the following goals:

## 2021-12-25 NOTE — CARE PLAN
Problem: Pain - Standard  Goal: Alleviation of pain or a reduction in pain to the patient’s comfort goal  Outcome: Progressing     Problem: Knowledge Deficit - Standard  Goal: Patient and family/care givers will demonstrate understanding of plan of care, disease process/condition, diagnostic tests and medications  Outcome: Progressing   The patient is Stable - Low risk of patient condition declining or worsening         Progress made toward(s) clinical / shift goals:   PATIENT UPDATED ON poc    Patient is not progressing towards the following goals:

## 2021-12-25 NOTE — DISCHARGE INSTRUCTIONS
Discharge Instructions    Discharged to home by car with relative. Discharged via wheelchair, hospital escort: Yes.  Special equipment needed: Not Applicable    Be sure to schedule a follow-up appointment with your primary care doctor or any specialists as instructed.     Discharge Plan:   Diet Plan: Discussed  Activity Level: Discussed  Confirmed Follow up Appointment: Appointment Scheduled  Confirmed Symptoms Management: Discussed  Medication Reconciliation Updated: Yes  Influenza Vaccine Indication: Not indicated: Previously immunized this influenza season and > 8 years of age    I understand that a diet low in cholesterol, fat, and sodium is recommended for good health. Unless I have been given specific instructions below for another diet, I accept this instruction as my diet prescription.   Other diet: Regular Diet    Special Instructions: None    · Is patient discharged on Warfarin / Coumadin?   No     Depression / Suicide Risk    As you are discharged from this [facility] facility, it is important to learn how to keep safe from harming yourself.    Recognize the warning signs:  · Abrupt changes in personality, positive or negative- including increase in energy   · Giving away possessions  · Change in eating patterns- significant weight changes-  positive or negative  · Change in sleeping patterns- unable to sleep or sleeping all the time   · Unwillingness or inability to communicate  · Depression  · Unusual sadness, discouragement and loneliness  · Talk of wanting to die  · Neglect of personal appearance   · Rebelliousness- reckless behavior  · Withdrawal from people/activities they love  · Confusion- inability to concentrate     If you or a loved one observes any of these behaviors or has concerns about self-harm, here's what you can do:  · Talk about it- your feelings and reasons for harming yourself  · Remove any means that you might use to hurt yourself (examples: pills, rope, extension cords,  firearm)  · Get professional help from the community (Mental Health, Substance Abuse, psychological counseling)  · Do not be alone:Call your Safe Contact- someone whom you trust who will be there for you.  · Call your local CRISIS HOTLINE 460-8732 or 129-975-9338  · Call your local Children's Mobile Crisis Response Team [place] (823) 422-7963 or www.FoKo  · Call the toll free National Suicide Prevention Hotlines   · National Suicide Prevention Lifeline 236-916-VLLE (9464)  · National Hope Line Network 800-SUICIDE (212-6115)

## 2021-12-25 NOTE — DISCHARGE SUMMARY
Discharge Summary    CHIEF COMPLAINT ON ADMISSION  Chief Complaint   Patient presents with   • Chest Pain     BIBA from home after patient was eating ceral and began to have chest pain under her left breast that radiates under both sides of ribs. It is re-produceable with palpation and inspiration. She did vomit 2-3x en route and was given 6.25mg Phenergan by EMS       Reason for Admission  Epigastric pain    Admission Date  12/24/2021    CODE STATUS  DNAR/DNI    HPI & HOSPITAL COURSE  This is a 88 y.o. female with a past medical history of COPD, hiatal hernia, GERD, hypertension, polymyalgia rheumatica on chronic steroids who presented with epigastric pain.  She reported associated nausea and vomiting.  Her pain was worse with deep inspiration and tender to palpation.  She denies any chest pain or shortness of breath.  She denies any fevers or chills.  In the ER she had multiple episodes of emesis and possible aspiration.  She was admitted overnight for further work-up.  Her troponins were negative.  EKG was negative for acute ischemic changes.  Chest x-ray revealed bibasilar atelectasis and mild pneumonitis.  Her pain had resolved after receiving pain meds and antiemetics.  Her pain is likely secondary to her hiatal hernia.  She has been instructed to follow-up with her PCP and surgeon.  She has been instructed to return to the ER for worsening pain, shortness of breath or fever.        Therefore, she is discharged in good and stable condition to home with close outpatient follow-up.        Discharge Date  12/25/21    FOLLOW UP ITEMS POST DISCHARGE  Follow up with pcp and surgeon for hiatal hernia    DISCHARGE DIAGNOSES  Principal Problem (Resolved):    Epigastric abdominal pain POA: Yes  Active Problems:    Atherosclerosis of aorta (HCC) POA: Yes    Obesity (BMI 30.0-34.9) POA: Yes    Chronic obstructive pulmonary disease (HCC) POA: Yes    GERD (gastroesophageal reflux disease) POA: Yes    Hypertension POA: Yes    " Polymyalgia rheumatica (HCC) POA: Yes    Leukocytosis POA: Unknown  Resolved Problems:    Hypokalemia POA: Unknown      FOLLOW UP  Future Appointments   Date Time Provider Department Center   1/7/2022 10:30 AM Belkis Vanegas P.A.-C. [address]     No follow-up provider specified.    MEDICATIONS ON DISCHARGE     Medication List      CONTINUE taking these medications      Instructions   albuterol 108 (90 Base) MCG/ACT Aers inhalation aerosol   INHALE 2 PUFFS BY INHALATION ROUTE EVERY 4-6 HRS AS NEEDED     chlorpheniramine 4 MG Tabs  Commonly known as: CHLOR-TRIMETRON   Take 4 mg by mouth every day. Second dose in the evening PRN  Dose: 4 mg     gabapentin 100 MG Caps  Commonly known as: NEURONTIN   Take 100 mg by mouth 3 times a day.  Dose: 100 mg     hydroCHLOROthiazide 25 MG Tabs  Commonly known as: HYDRODIURIL   Take 25 mg by mouth every day.  Dose: 25 mg     losartan 50 MG Tabs  Commonly known as: COZAAR   Take 50 mg by mouth every day.  Dose: 50 mg     lovastatin 40 MG tablet  Commonly known as: MEVACOR   Take 40 mg by mouth every evening.  Dose: 40 mg     MUCINEX DM PO   Take 1 Tablet by mouth 1 time a day as needed (Mucus).  Dose: 1 Tablet     omeprazole 40 MG delayed-release capsule  Commonly known as: PRILOSEC   Take 40 mg by mouth 2 (two) times a day.  Dose: 40 mg     predniSONE 5 MG Tabs  Commonly known as: DELTASONE   Take 5 mg by mouth 2 times a day.  Dose: 5 mg     Vitamin D3 2000 UNIT Caps   Take 2 Capsules by mouth every day.  Dose: 2 Capsule     zolpidem 5 MG Tabs  Commonly known as: AMBIEN   Take  by mouth at bedtime. Take 1 tablet by mouth every night at bedtime as needed            Allergies  Allergies   Allergen Reactions   • Celebrex [Celecoxib] Unspecified     GERD reaction   • Lisinopril Cough   • Nsaids Unspecified     GERD reaction   • Other Drug Unspecified     bioxin   • Benadryl Allergy Unspecified     \"tongue gets fuzzy and feel like I am drugged\"       DIET  Orders Placed This " Encounter   Procedures   • Diet Order Diet: Regular     Standing Status:   Standing     Number of Occurrences:   1     Order Specific Question:   Diet:     Answer:   Regular [1]       ACTIVITY  As tolerated.  Weight bearing as tolerated    CONSULTATIONS  none    PROCEDURES  none    LABORATORY  Lab Results   Component Value Date    SODIUM 138 12/24/2021    POTASSIUM 3.4 (L) 12/24/2021    CHLORIDE 97 12/24/2021    CO2 26 12/24/2021    GLUCOSE 160 (H) 12/24/2021    BUN 20 12/24/2021    CREATININE 0.82 12/24/2021    CREATININE 0.9 12/12/2007        Lab Results   Component Value Date    WBC 15.4 (H) 12/24/2021    HEMOGLOBIN 16.0 12/24/2021    HEMATOCRIT 47.2 (H) 12/24/2021    PLATELETCT 336 12/24/2021        Total time of the discharge process exceeds 32 minutes.

## 2021-12-25 NOTE — PROGRESS NOTES
4 Eyes Skin Assessment Completed by JOÃO Encarnacion and JOÃO Langley.    Head WDL  Ears WDL  Nose WDL  Mouth WDL  Neck WDL  Breast/Chest WDL  Shoulder Blades WDL  Spine WDL  (R) Arm/Elbow/Hand WDL  (L) Arm/Elbow/Hand WDL  Abdomen WDL  Groin WDL  Scrotum/Coccyx/Buttocks WDL  (R) Leg WDL  (L) Leg WDL  (R) Heel/Foot/Toe WDL  (L) Heel/Foot/Toe WDL          Devices In Places Pulse Ox      Interventions In Place Pillows    Possible Skin Injury No    Pictures Uploaded Into Epic No, needs to be completed  Wound Consult Placed N/A  RN Wound Prevention Protocol Ordered No

## 2021-12-25 NOTE — CARE PLAN
Problem: Pain - Standard  Goal: Alleviation of pain or a reduction in pain to the patient’s comfort goal  Outcome: Progressing     Problem: Knowledge Deficit - Standard  Goal: Patient and family/care givers will demonstrate understanding of plan of care, disease process/condition, diagnostic tests and medications  Outcome: Progressing   The patient is Stable - Low risk of patient condition declining or worsening    Shift Goals  Clinical Goals: pain control, rest  Patient Goals: pain control, discharge  Family Goals: n/a    Progress made toward(s) clinical / shift goals:  patient updated on POC    Patient is not progressing towards the following goals:

## 2021-12-25 NOTE — PROGRESS NOTES
IV dc'd.  Discharge instructions given to patient; patient verbalizes understanding, all questions answered.  Copy of DC summary provided, signed copy in chart.  0 prescriptions electronically sent to pt's pharmacy/provided to patient, copies in chart.  Pt states personal belongings are in possession.  Pt escorted off unit by this RN without incident.

## 2021-12-25 NOTE — PROGRESS NOTES
"Report received from JOÃO Griffiths.  Assumed care of pt.  Pt in bed, resting. AOx4, responds appropriately. Pain 3/10 in L chest, describes as \"trapped gas feeling\".  Denies SOB, n/v. Reviewed POC, call light and belongings within reach, treaded slipper socks on, bed in lowest position.   "

## 2021-12-25 NOTE — PROGRESS NOTES
Assessment completed. Pt A&Ox 4. Respirations are even and unlabored on room air. Pt denies pain at this time. Monitors applied, VS stable, call light and belongings within reach. POC updated (Labs). Pt educated on room and call light, pt verbalized understanding. Communication board updated. Needs met.

## 2022-01-07 ENCOUNTER — OFFICE VISIT (OUTPATIENT)
Dept: MEDICAL GROUP | Facility: MEDICAL CENTER | Age: 87
End: 2022-01-07
Payer: MEDICARE

## 2022-01-07 VITALS
TEMPERATURE: 97.2 F | HEIGHT: 63 IN | OXYGEN SATURATION: 95 % | RESPIRATION RATE: 16 BRPM | SYSTOLIC BLOOD PRESSURE: 122 MMHG | WEIGHT: 178 LBS | BODY MASS INDEX: 31.54 KG/M2 | HEART RATE: 95 BPM | DIASTOLIC BLOOD PRESSURE: 74 MMHG

## 2022-01-07 DIAGNOSIS — I70.0 ATHEROSCLEROSIS OF AORTA (HCC): ICD-10-CM

## 2022-01-07 DIAGNOSIS — F13.20 DIAZEPAM DEPENDENCE (HCC): ICD-10-CM

## 2022-01-07 DIAGNOSIS — G31.9 CEREBRAL ATROPHY (HCC): ICD-10-CM

## 2022-01-07 DIAGNOSIS — J44.9 CHRONIC OBSTRUCTIVE PULMONARY DISEASE, UNSPECIFIED COPD TYPE (HCC): ICD-10-CM

## 2022-01-07 DIAGNOSIS — M35.3 PMR (POLYMYALGIA RHEUMATICA) (HCC): ICD-10-CM

## 2022-01-07 DIAGNOSIS — K21.9 GASTROESOPHAGEAL REFLUX DISEASE, UNSPECIFIED WHETHER ESOPHAGITIS PRESENT: ICD-10-CM

## 2022-01-07 DIAGNOSIS — E78.00 HYPERCHOLESTEROLEMIA: ICD-10-CM

## 2022-01-07 DIAGNOSIS — I10 HYPERTENSION, UNSPECIFIED TYPE: ICD-10-CM

## 2022-01-07 DIAGNOSIS — N18.31 CHRONIC KIDNEY DISEASE, STAGE 3A: ICD-10-CM

## 2022-01-07 PROBLEM — K76.0 NAFLD (NONALCOHOLIC FATTY LIVER DISEASE): Status: ACTIVE | Noted: 2021-12-20

## 2022-01-07 PROBLEM — R79.82 CRP ELEVATED: Status: ACTIVE | Noted: 2021-12-20

## 2022-01-07 PROBLEM — D72.829 LEUKOCYTOSIS: Status: ACTIVE | Noted: 2021-12-20

## 2022-01-07 PROBLEM — R73.03 PREDIABETES: Status: ACTIVE | Noted: 2021-12-20

## 2022-01-07 PROBLEM — E61.1 LOW IRON: Status: ACTIVE | Noted: 2021-12-20

## 2022-01-07 PROBLEM — R76.0 ELEVATED ANTIBODY LEVELS: Status: ACTIVE | Noted: 2021-12-20

## 2022-01-07 PROBLEM — I12.9 HYPERTENSIVE RENAL DISEASE: Status: ACTIVE | Noted: 2021-12-20

## 2022-01-07 PROBLEM — M25.50 ARTHRALGIA: Status: ACTIVE | Noted: 2021-12-20

## 2022-01-07 PROBLEM — D75.839 THROMBOCYTOSIS: Status: ACTIVE | Noted: 2021-12-20

## 2022-01-07 PROBLEM — E55.9 VITAMIN D DEFICIENCY: Status: ACTIVE | Noted: 2021-12-20

## 2022-01-07 PROCEDURE — 99214 OFFICE O/P EST MOD 30 MIN: CPT | Performed by: STUDENT IN AN ORGANIZED HEALTH CARE EDUCATION/TRAINING PROGRAM

## 2022-01-07 ASSESSMENT — FIBROSIS 4 INDEX: FIB4 SCORE: 1.66

## 2022-01-07 NOTE — LETTER
[facility]  Belkis Vanegas P.A.-C.  [address] [facility] [address]  [facility] [address]  Fax: [phone]   Authorization for Release/Disclosure of   Protected Health Information   Name: AMISH KOCH : 4/10/1933 SSN: [id number]   Address: [address] [place] [id number] Phone:    [phone] (home)    I authorize the entity listed below to release/disclose the PHI below to:   [facility]/Belkis Vanegas P.A.-C. and Belkis Vanegas P.A.-C.   Provider or Entity Name:  [facility]    Address   City, State, Zip   Phone:      Fax:     Reason for request: continuity of care   Information to be released:    [  ] LAST COLONOSCOPY,  including any PATH REPORT and follow-up  [  ] LAST FIT/COLOGUARD RESULT [  ] LAST DEXA  [  ] LAST MAMMOGRAM  [  ] LAST PAP  [  ] LAST LABS [  ] RETINA EXAM REPORT  [  ] IMMUNIZATION RECORDS  [  ] Release all info      [  ] Check here and initial the line next to each item to release ALL health information INCLUDING  _____ Care and treatment for drug and / or alcohol abuse  _____ HIV testing, infection status, or AIDS  _____ Genetic Testing    DATES OF SERVICE OR TIME PERIOD TO BE DISCLOSED: _____________  I understand and acknowledge that:  * This Authorization may be revoked at any time by you in writing, except if your health information has already been used or disclosed.  * Your health information that will be used or disclosed as a result of you signing this authorization could be re-disclosed by the recipient. If this occurs, your re-disclosed health information may no longer be protected by State or Federal laws.  * You may refuse to sign this Authorization. Your refusal will not affect your ability to obtain treatment.  * This Authorization becomes effective upon signing and will  on (date) __________.      If no date is indicated, this Authorization will  one (1) year from the signature date.    Name: Amish Koch    Signature:   Date:     2022        PLEASE FAX REQUESTED RECORDS BACK TO: [phone]

## 2022-01-07 NOTE — PROGRESS NOTES
"Subjective:     CC: Establish care, hospital follow-up, COPD, polymyalgia rheumatica    HPI:   Iveth presents today to establish care.  This is a previous patient of Dr. Herron.    Past medical history of COPD, hiatal hernia, GERD, hypertension, polymyalgia rheumatica on chronic steroids.    Hospital follow-up /GERD  Patient presented to the emergency room with epigastric pain and was hospitalized overnight.  Patient's troponins were negative, EKG was negative for acute ischemic changes and chest x-ray revealed bibasilar atelectasis and mild pneumonitis.  Pain was determined to be secondary to hiatal hernia and recommended surgery follow-up.  She has follow-up scheduled for next week.    COPD  Patient currently taking albuterol inhaler twice daily.  Discussed with patient the need for more long-term medication but previously used Advair which is not on formulary.  Will try Spiriva.  Patient has not previously been evaluated by pulmonary.  Recent chest x-ray shows lungs are hyperinflated with flattened diaphragms.    Polymyalgia rheumatica  Patient continues to follow with Dr. Bryant at rheumatology.  Patient continues on prednisone and will have repeat testing in a few weeks.    Hypertension  Blood pressure controlled in office today 122/74.  Patient continues on hydrochlorothiazide and losartan.      Cerebral atrophy  CT of head in 2013 showed cerebral atrophy.    Atherosclerosis of aorta  Atherosclerosis of aorta noted on imaging in 2013.  Continue to control blood pressure and cholesterol.  Patient taking lovastatin 40 mg.         ROS:  Gen: no fevers/chills  Pulm: no sob, no cough  CV: no chest pain, no palpitations  GI: no nausea/vomiting, no diarrhea  Neuro: no headaches, no numbness/tingling  Heme/Lymph: no easy bruising      Objective:     Exam:  /74 (BP Location: Right arm, Patient Position: Sitting, BP Cuff Size: Adult)   Pulse 95   Temp 36.2 °C (97.2 °F) (Temporal)   Resp 16   Ht 1.6 m (5' 3\")   " Wt 80.7 kg (178 lb)   SpO2 95%   BMI 31.53 kg/m²  Body mass index is 31.53 kg/m².    Gen: Alert and oriented, No apparent distress.  Neck: Neck is supple without lymphadenopathy.  Lungs: Normal effort, CTA bilaterally, no wheezes, rhonchi, or rales  CV: Regular rate and rhythm. No murmurs, rubs, or gallops.  Ext: No clubbing, cyanosis, edema.      Assessment & Plan:     88 y.o. female with the following -     1. Chronic obstructive pulmonary disease, unspecified COPD type ([facility])  Chronic, stable.  Patient without previous pulmonary function testing.  Will get pulmonary function testing and recommend patient start on Spiriva inhaler.  - PULMONARY FUNCTION TESTS -Test requested: Complete Pulmonary Function Test; Include MIPS/MEPS? No; Future    2. PMR (polymyalgia rheumatica) ([facility])  Chronic, stable.  Patient continues to follow with rheumatology.    3. Chronic kidney disease, stage 3a ([facility])  Chronic, stable.  Patient continues to follow with nephrology.    4. Atherosclerosis of aorta ([facility])  Chronic, stable.  Patient has not had imaging since 2013, will consider repeat imaging.    5. Cerebral atrophy ([facility])  Chronic, stable.  Patient has not had imaging since 2013.    6. Hypertension, unspecified type  Chronic, stable.  Blood pressure controlled with losartan.    7. Hypercholesterolemia  Chronic, stable.  Patient cholesterol well controlled with lovastatin.    8. Gastroesophageal reflux disease, unspecified whether esophagitis present  Chronic, stable.  Patient continues to follow with GI.    9. 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  Patient continues to use Ambien nightly.    No follow-ups on file.    Please note that this dictation was created using voice recognition software. I have made every reasonable attempt to correct obvious errors, but I expect that there are errors of grammar and possibly content that I did not discover before finalizing the note.

## 2022-01-27 ENCOUNTER — HOSPITAL ENCOUNTER (OUTPATIENT)
Dept: LAB | Facility: MEDICAL CENTER | Age: 87
End: 2022-01-27
Attending: INTERNAL MEDICINE
Payer: MEDICARE

## 2022-01-27 LAB
CRP SERPL HS-MCNC: <0.3 MG/DL (ref 0–0.75)
ERYTHROCYTE [SEDIMENTATION RATE] IN BLOOD BY WESTERGREN METHOD: 5 MM/HOUR (ref 0–25)

## 2022-01-27 PROCEDURE — 85652 RBC SED RATE AUTOMATED: CPT

## 2022-01-27 PROCEDURE — 86140 C-REACTIVE PROTEIN: CPT

## 2022-01-27 PROCEDURE — 36415 COLL VENOUS BLD VENIPUNCTURE: CPT

## 2022-02-02 ENCOUNTER — OFFICE VISIT (OUTPATIENT)
Dept: CARDIOLOGY | Facility: MEDICAL CENTER | Age: 87
End: 2022-02-02
Payer: MEDICARE

## 2022-02-02 VITALS
OXYGEN SATURATION: 92 % | HEART RATE: 100 BPM | SYSTOLIC BLOOD PRESSURE: 132 MMHG | DIASTOLIC BLOOD PRESSURE: 74 MMHG | HEIGHT: 63 IN | BODY MASS INDEX: 31.89 KG/M2 | WEIGHT: 180 LBS | RESPIRATION RATE: 16 BRPM

## 2022-02-02 DIAGNOSIS — R07.9 CHEST PAIN, UNSPECIFIED TYPE: ICD-10-CM

## 2022-02-02 PROCEDURE — 99213 OFFICE O/P EST LOW 20 MIN: CPT | Performed by: INTERNAL MEDICINE

## 2022-02-02 RX ORDER — METHOCARBAMOL 750 MG/1
750 TABLET, FILM COATED ORAL
COMMUNITY
Start: 2022-01-11 | End: 2022-04-07

## 2022-02-02 ASSESSMENT — FIBROSIS 4 INDEX: FIB4 SCORE: 1.66

## 2022-02-02 NOTE — PROGRESS NOTES
Cardiology Initial Consultation Note    Date of note:    2/2/2022      Patient Name: Iveth Martin   YOB: 1933  MRN:              [id number]    Chief Complaint: chest pains    Iveth Martin is a 88 y.o. female  patient presented today with chest pains.  Lower chest, radiates to both sides, random time, 6-7 minutes, moderate intensity.  Warm cup of tea helps.  [place] fanny severe pain - went to hospital.   She has GERD, hiatal hernia.        Past Medical History:   Diagnosis Date   • Hyperlipidemia    • Hypertension              Current Outpatient Medications   Medication Sig Dispense Refill   • gabapentin (NEURONTIN) 100 MG Cap Take 100 mg by mouth 3 times a day.     • predniSONE (DELTASONE) 5 MG Tab Take 5 mg by mouth 2 times a day.     • Cholecalciferol (VITAMIN D3) 2000 UNIT Cap Take 2 Capsules by mouth every day.     • chlorpheniramine (CHLOR-TRIMETRON) 4 MG Tab Take 4 mg by mouth every day. Second dose in the evening PRN     • hydroCHLOROthiazide (HYDRODIURIL) 25 MG Tab Take 25 mg by mouth every day.     • losartan (COZAAR) 50 MG Tab Take 50 mg by mouth every day.     • lovastatin (MEVACOR) 40 MG tablet Take 40 mg by mouth every evening.     • zolpidem (AMBIEN) 5 MG Tab Take  by mouth at bedtime. Take 1 tablet by mouth every night at bedtime as needed     • albuterol 108 (90 Base) MCG/ACT Aero Soln inhalation aerosol INHALE 2 PUFFS BY INHALATION ROUTE EVERY 4-6 HRS AS NEEDED     • omeprazole (PRILOSEC) 40 MG delayed-release capsule Take 40 mg by mouth 2 (two) times a day.     • Dextromethorphan-Guaifenesin (MUCINEX DM PO) Take 1 Tablet by mouth 1 time a day as needed (Mucus).     • methocarbamol (ROBAXIN) 750 MG Tab TAKE 1 TABLET BY MOUTH 4 TIMES A DAY M54.16       No current facility-administered medications for this visit.             Physical Exam:  Ambulatory Vitals  /74 (BP Location: Left arm, Patient Position: Sitting, BP Cuff Size: Adult)   Pulse 100   Resp 16   Ht  "1.6 m (5' 3\")   Wt 81.6 kg (180 lb)   SpO2 92%    Oxygen Therapy:  Pulse Oximetry: 92 %  BP Readings from Last 4 Encounters:   22 132/74   22 122/74   21 135/73   21 130/80       Weight/BMI: Body mass index is 31.89 kg/m².  Wt Readings from Last 4 Encounters:   22 81.6 kg (180 lb)   22 80.7 kg (178 lb)   21 80.5 kg (177 lb 7.5 oz)   21 81.6 kg (179 lb 14.4 oz)           General: Well appearing and in no apparent distress  Neck: carotid bruits absent  Lungs: respiratory sounds  normal  Heart: Regular rhythm,  No palpable thrills on palpation, murmurs absent, no rubs,   [place] extremity edema absent.       Hospital notes, GI notes reviewed.    EKG - reviewed and personally interpreted- sinus rhythm, no significant ST changes.    Medical Decision Makin year old female with HTN. COPD, obesity, polymyalgia rheumatica, pre-diabets here with chest pains.  -Recommend stress test.  -If negative continue with GI evaluation.      This note was dictated using Dragon speech recognition software.    Jese MARRUFO  Interventional cardiologist  [facility]  [facility], [address]  [address] [address]  [place] [address]  Phone: [phone]  Fax: [phone]                "

## 2022-02-04 ENCOUNTER — HOSPITAL ENCOUNTER (OUTPATIENT)
Dept: PULMONOLOGY | Facility: MEDICAL CENTER | Age: 87
End: 2022-02-04
Attending: STUDENT IN AN ORGANIZED HEALTH CARE EDUCATION/TRAINING PROGRAM
Payer: MEDICARE

## 2022-02-04 DIAGNOSIS — J44.9 CHRONIC OBSTRUCTIVE PULMONARY DISEASE, UNSPECIFIED COPD TYPE (HCC): ICD-10-CM

## 2022-02-04 PROCEDURE — 94060 EVALUATION OF WHEEZING: CPT | Mod: 26 | Performed by: INTERNAL MEDICINE

## 2022-02-04 PROCEDURE — 94726 PLETHYSMOGRAPHY LUNG VOLUMES: CPT | Mod: 26 | Performed by: INTERNAL MEDICINE

## 2022-02-04 PROCEDURE — 94060 EVALUATION OF WHEEZING: CPT

## 2022-02-04 PROCEDURE — 94729 DIFFUSING CAPACITY: CPT | Mod: 26 | Performed by: INTERNAL MEDICINE

## 2022-02-04 PROCEDURE — 94729 DIFFUSING CAPACITY: CPT

## 2022-02-04 PROCEDURE — 94726 PLETHYSMOGRAPHY LUNG VOLUMES: CPT

## 2022-02-04 RX ADMIN — Medication 2.5 MG: at 16:02

## 2022-02-04 ASSESSMENT — PULMONARY FUNCTION TESTS
FEV1/FVC: 80
FEV1/FVC_PERCENT_CHANGE: -1
FEV1_PERCENT_PREDICTED: 94
FEV1/FVC_PREDICTED: 76.34
FEV1_PERCENT_PREDICTED: 100
FEV1_LLN: 1.41
FEV1/FVC_PERCENT_PREDICTED: 105
FVC_PREDICTED: 2.25
FEV1_LLN: 1.41
FEV1/FVC_PERCENT_PREDICTED: 75
FEV1_PERCENT_CHANGE: 5
FEV1/FVC_PERCENT_PREDICTED: 107
FEV1/FVC: 78.70
FEV1/FVC: 80.16
FEV1/FVC_PERCENT_LLN: 63.75
FEV1_PERCENT_CHANGE: 7
FVC_PERCENT_PREDICTED: 95
FVC_LLN: 1.88
FEV1/FVC: 78.6
FEV1_PREDICTED: 1.69
FEV1/FVC_PERCENT_LLN: 63.75
FVC: 2.15
FEV1/FVC_PERCENT_PREDICTED: 103
FVC: 2
FEV1: 1.69
FVC_LLN: 1.88
FEV1/FVC_PERCENT_CHANGE: 71
FVC_PERCENT_PREDICTED: 88
FEV1/FVC_PERCENT_PREDICTED: 105
FEV1: 1.6

## 2022-02-06 NOTE — PROCEDURES
DATE OF SERVICE:  02/04/2022     PULMONARY FUNCTION TEST INTERPRETATION     SPIROMETRY:  There is no evidence of obstruction, manifested by FEV1/FVC ratio   of 80.16%.  Noted that the FEV1 was 100% in the post-bronchodilator arm (1.69   liters).     There is no response to bronchodilators in this test.     LUNG VOLUMES:  The lung volumes are normal, manifested by a normal TLC of 93% (4.61 liters),   RV is 94% (2.36 liters).     DIFFUSION:  There is no impairment in the diffusion capacity manifested by a normal DLCO   of 81%.     Flow volume curves correlates with the information above.     CONCLUSION:  This is a normal test.        ______________________________  MD UBALDO Magana/UMER    DD:  02/05/2022 18:32  DT:  02/05/2022 18:50    Job#:  [id number]

## 2022-02-09 ENCOUNTER — HOSPITAL ENCOUNTER (OUTPATIENT)
Dept: RADIOLOGY | Facility: MEDICAL CENTER | Age: 87
End: 2022-02-09
Attending: INTERNAL MEDICINE
Payer: MEDICARE

## 2022-02-09 DIAGNOSIS — R07.9 CHEST PAIN, UNSPECIFIED TYPE: ICD-10-CM

## 2022-02-09 PROCEDURE — 93018 CV STRESS TEST I&R ONLY: CPT | Performed by: INTERNAL MEDICINE

## 2022-02-09 PROCEDURE — 700111 HCHG RX REV CODE 636 W/ 250 OVERRIDE (IP)

## 2022-02-09 PROCEDURE — 78452 HT MUSCLE IMAGE SPECT MULT: CPT | Mod: 26 | Performed by: INTERNAL MEDICINE

## 2022-02-09 PROCEDURE — A9502 TC99M TETROFOSMIN: HCPCS

## 2022-02-09 RX ORDER — AMINOPHYLLINE 25 MG/ML
100 INJECTION, SOLUTION INTRAVENOUS
Status: DISCONTINUED | OUTPATIENT
Start: 2022-02-09 | End: 2022-02-10 | Stop reason: HOSPADM

## 2022-02-09 RX ORDER — REGADENOSON 0.08 MG/ML
INJECTION, SOLUTION INTRAVENOUS
Status: COMPLETED
Start: 2022-02-09 | End: 2022-02-09

## 2022-02-09 RX ORDER — REGADENOSON 0.08 MG/ML
0.4 INJECTION, SOLUTION INTRAVENOUS ONCE
Status: DISCONTINUED | OUTPATIENT
Start: 2022-02-09 | End: 2022-02-10 | Stop reason: HOSPADM

## 2022-02-09 RX ADMIN — REGADENOSON 0.4 MG: 0.08 INJECTION, SOLUTION INTRAVENOUS at 12:07

## 2022-02-10 ENCOUNTER — TELEPHONE (OUTPATIENT)
Dept: CARDIOLOGY | Facility: MEDICAL CENTER | Age: 87
End: 2022-02-10

## 2022-02-10 NOTE — TELEPHONE ENCOUNTER
----- Message from ISRAEL Dillon sent at 2/10/2022 10:12 AM PST -----  Normal stress imaging. Let patient know. SC

## 2022-02-17 ENCOUNTER — TELEPHONE (OUTPATIENT)
Dept: MEDICAL GROUP | Facility: MEDICAL CENTER | Age: 87
End: 2022-02-17
Payer: MEDICARE

## 2022-02-17 NOTE — TELEPHONE ENCOUNTER
Patient called stating that her legs and feet have been swelling for the past 3 days. She stated that they are painful to the touch and a deep pink. She thought it might be because of her change in Prednisone dose for her PMR, but her rheumatologist told her no and to contact her PCP. This RN discussed with Belkis Vanegas the situation. Belkis stated to schedule her an appointment.     Phone Number Called: [phone]    Call outcome: Spoke to patient regarding message below.    Message: Called to inform patient of message above. Patient scheduled for 02/18/2022 at 0800AM.

## 2022-02-17 NOTE — TELEPHONE ENCOUNTER
ESTABLISHED PATIENT PRE-VISIT PLANNING     Annual Wellness Visit Never Done      Patient was NOT contacted to complete PVP.     Note: Patient will not be contacted if there is no indication to call.     1.  Reviewed notes from the last few office visits within the medical group: Yes    2.  If any orders were placed at last visit or intended to be done for this visit (i.e. 6 mos follow-up), do we have Results/Consult Notes?         •  Labs - Labs were not ordered at last office visit.  Note: If patient appointment is for lab review and patient did not complete labs, check with provider if OK to reschedule patient until labs completed.       •  Imaging - Imaging was not ordered at last office visit.       •  Referrals - No referrals were ordered at last office visit.    3. Is this appointment scheduled as a Hospital Follow-Up? No    4.  Immunizations were updated in Epic using Reconcile Outside Information activity? Yes    5.  Patient is due for the following Health Maintenance Topics:   Health Maintenance Due   Topic Date Due   • Annual Wellness Visit  Never done   • IMM ZOSTER VACCINES (1 of 2) Never done   • BONE DENSITY  Never done       6.  AHA (Pulse8) form printed for Provider? No, already completed  AHA DONE 01/07/2022.

## 2022-02-18 ENCOUNTER — OFFICE VISIT (OUTPATIENT)
Dept: MEDICAL GROUP | Facility: MEDICAL CENTER | Age: 87
End: 2022-02-18
Payer: MEDICARE

## 2022-02-18 ENCOUNTER — HOSPITAL ENCOUNTER (OUTPATIENT)
Dept: RADIOLOGY | Facility: MEDICAL CENTER | Age: 87
End: 2022-02-18
Attending: STUDENT IN AN ORGANIZED HEALTH CARE EDUCATION/TRAINING PROGRAM
Payer: MEDICARE

## 2022-02-18 VITALS
SYSTOLIC BLOOD PRESSURE: 128 MMHG | WEIGHT: 183.42 LBS | HEART RATE: 96 BPM | BODY MASS INDEX: 32.5 KG/M2 | HEIGHT: 63 IN | OXYGEN SATURATION: 94 % | TEMPERATURE: 96.4 F | DIASTOLIC BLOOD PRESSURE: 64 MMHG

## 2022-02-18 DIAGNOSIS — M79.604 BILATERAL LEG PAIN: ICD-10-CM

## 2022-02-18 DIAGNOSIS — M79.605 BILATERAL LEG PAIN: ICD-10-CM

## 2022-02-18 PROCEDURE — 93970 EXTREMITY STUDY: CPT

## 2022-02-18 PROCEDURE — 99213 OFFICE O/P EST LOW 20 MIN: CPT | Performed by: STUDENT IN AN ORGANIZED HEALTH CARE EDUCATION/TRAINING PROGRAM

## 2022-02-18 ASSESSMENT — FIBROSIS 4 INDEX: FIB4 SCORE: 1.66

## 2022-02-18 ASSESSMENT — PATIENT HEALTH QUESTIONNAIRE - PHQ9: CLINICAL INTERPRETATION OF PHQ2 SCORE: 0

## 2022-02-18 NOTE — PROGRESS NOTES
"Subjective:     CC: Bilateral leg pain    HPI:   Iveth presents today   Bilateral leg pain  Patient presents today for bilateral leg pain and swelling that started yesterday.  Patient notes that she elevated and iced her legs.  Patient swelling has gone down today but continued to be extremely tender to touch on bilateral medial calf.  Patient does note that she recently decreased her prednisone to just 5 mg daily.  Patient notes that she had bilateral ankle swelling and tenderness prior to going on prednisone.  Patient denies chest pain or shortness of breath.    ROS:  Gen: no fevers/chills  Pulm: no sob, no cough  CV: no chest pain, no palpitations  GI: no nausea/vomiting, no diarrhea  Neuro: no headaches, no numbness/tingling  Heme/Lymph: no easy bruising      Objective:     Exam:  /64   Pulse 96   Temp (!) 35.8 °C (96.4 °F) (Temporal)   Ht 1.6 m (5' 3\")   Wt 83.2 kg (183 lb 6.8 oz)   SpO2 94%   BMI 32.49 kg/m²  Body mass index is 32.49 kg/m².    Gen: Alert and oriented, No apparent distress.  Neck: Neck is supple without lymphadenopathy.  Lungs: Normal effort, CTA bilaterally, no wheezes, rhonchi, or rales  CV: Regular rate and rhythm. No murmurs, rubs, or gallops.  Ext: No edema bilateral legs.  Erythema to bilateral medial calves and severe tenderness to palpation.      Assessment & Plan:     88 y.o. female with the following -     1. Bilateral leg pain  Acute on chronic, stable.  No edema noted to bilateral legs but severe tenderness to palpation on medial calf.  Patient encouraged to continue elevating and decreasing salt intake.  Will get ultrasound to evaluate due to severe tenderness and sudden onset swelling.  Discussed with patient that this may be secondary to increased inflammation from reduction in prednisone.  Patient will follow up with rheumatology.  Continue to evaluate.  - US-EXTREMITY VENOUS LOWER BILAT; Future        Return if symptoms worsen or fail to improve.    Please note " that this dictation was created using voice recognition software. I have made every reasonable attempt to correct obvious errors, but I expect that there are errors of grammar and possibly content that I did not discover before finalizing the note.

## 2022-03-15 RX ORDER — LOVASTATIN 40 MG/1
40 TABLET ORAL EVERY EVENING
Qty: 30 TABLET | Status: CANCELLED | OUTPATIENT
Start: 2022-03-15

## 2022-03-15 RX ORDER — ZOLPIDEM TARTRATE 5 MG/1
TABLET ORAL
Qty: 30 TABLET | Status: CANCELLED | OUTPATIENT
Start: 2022-03-15

## 2022-03-17 RX ORDER — LOVASTATIN 40 MG/1
40 TABLET ORAL EVERY EVENING
Qty: 30 TABLET | Refills: 11 | Status: SHIPPED | OUTPATIENT
Start: 2022-03-17 | End: 2022-04-11

## 2022-03-17 RX ORDER — ZOLPIDEM TARTRATE 5 MG/1
TABLET ORAL
Qty: 30 TABLET | OUTPATIENT
Start: 2022-03-17

## 2022-03-18 DIAGNOSIS — F13.20 SEDATIVE HYPNOTIC OR ANXIOLYTIC DEPENDENCE (HCC): ICD-10-CM

## 2022-03-18 DIAGNOSIS — G47.00 INSOMNIA, UNSPECIFIED TYPE: ICD-10-CM

## 2022-03-18 RX ORDER — ZOLPIDEM TARTRATE 5 MG/1
5 TABLET ORAL NIGHTLY PRN
Qty: 30 TABLET | Refills: 0 | Status: SHIPPED | OUTPATIENT
Start: 2022-03-18 | End: 2022-07-20 | Stop reason: SDUPTHER

## 2022-03-18 NOTE — TELEPHONE ENCOUNTER
Patient called upset, she would like to have this prescription before the weekend and with Blekis been out of office today I was wondering if you can refill it for her? Thank you

## 2022-03-24 ENCOUNTER — HOSPITAL ENCOUNTER (OUTPATIENT)
Dept: LAB | Facility: MEDICAL CENTER | Age: 87
End: 2022-03-24
Attending: INTERNAL MEDICINE
Payer: MEDICARE

## 2022-03-24 LAB
CRP SERPL HS-MCNC: 0.4 MG/DL (ref 0–0.75)
ERYTHROCYTE [SEDIMENTATION RATE] IN BLOOD BY WESTERGREN METHOD: 7 MM/HOUR (ref 0–25)

## 2022-03-24 PROCEDURE — 85652 RBC SED RATE AUTOMATED: CPT

## 2022-03-24 PROCEDURE — 86140 C-REACTIVE PROTEIN: CPT

## 2022-03-24 PROCEDURE — 36415 COLL VENOUS BLD VENIPUNCTURE: CPT

## 2022-04-06 ENCOUNTER — TELEPHONE (OUTPATIENT)
Dept: MEDICAL GROUP | Facility: MEDICAL CENTER | Age: 87
End: 2022-04-06
Payer: MEDICARE

## 2022-04-06 NOTE — TELEPHONE ENCOUNTER
ESTABLISHED PATIENT PRE-VISIT PLANNING     Annual Wellness Visit Never Done      Patient was NOT contacted to complete PVP.     Note: Patient will not be contacted if there is no indication to call.     1.  Reviewed notes from the last few office visits within the medical group: Yes    2.  If any orders were placed at last visit or intended to be done for this visit (i.e. 6 mos follow-up), do we have Results/Consult Notes?         •  Labs - Labs were not ordered at last office visit.   Last office visit with PCP Provider Guilherme was on 02/18/2022.  Note: If patient appointment is for lab review and patient did not complete labs, check with provider if OK to reschedule patient until labs completed.       •  Imaging - Imaging ordered, completed and results are in chart.       •  Referrals - No referrals were ordered at last office visit.    3. Is this appointment scheduled as a Hospital Follow-Up? No    4.  Immunizations were updated in Epic using Reconcile Outside Information activity? Yes    5.  Patient is due for the following Health Maintenance Topics:   Health Maintenance Due   Topic Date Due   • Annual Wellness Visit  Never done   • IMM ZOSTER VACCINES (1 of 2) Never done   • BONE DENSITY  Never done       6.  AHA (Pulse8) form printed for Provider? No, already completed, AHA Done 01/07/2022

## 2022-04-07 ENCOUNTER — OFFICE VISIT (OUTPATIENT)
Dept: MEDICAL GROUP | Facility: MEDICAL CENTER | Age: 87
End: 2022-04-07
Payer: MEDICARE

## 2022-04-07 VITALS
WEIGHT: 182 LBS | RESPIRATION RATE: 16 BRPM | SYSTOLIC BLOOD PRESSURE: 116 MMHG | TEMPERATURE: 97.6 F | OXYGEN SATURATION: 95 % | HEIGHT: 63 IN | HEART RATE: 87 BPM | BODY MASS INDEX: 32.25 KG/M2 | DIASTOLIC BLOOD PRESSURE: 72 MMHG

## 2022-04-07 DIAGNOSIS — J44.9 CHRONIC OBSTRUCTIVE PULMONARY DISEASE, UNSPECIFIED COPD TYPE (HCC): ICD-10-CM

## 2022-04-07 DIAGNOSIS — I10 HYPERTENSION, UNSPECIFIED TYPE: ICD-10-CM

## 2022-04-07 DIAGNOSIS — G89.29 CHRONIC BILATERAL LOW BACK PAIN WITHOUT SCIATICA: ICD-10-CM

## 2022-04-07 DIAGNOSIS — M79.605 BILATERAL LEG PAIN: ICD-10-CM

## 2022-04-07 DIAGNOSIS — R73.01 IFG (IMPAIRED FASTING GLUCOSE): ICD-10-CM

## 2022-04-07 DIAGNOSIS — M79.604 BILATERAL LEG PAIN: ICD-10-CM

## 2022-04-07 DIAGNOSIS — F13.20 SEDATIVE HYPNOTIC OR ANXIOLYTIC DEPENDENCE (HCC): ICD-10-CM

## 2022-04-07 DIAGNOSIS — M54.50 CHRONIC BILATERAL LOW BACK PAIN WITHOUT SCIATICA: ICD-10-CM

## 2022-04-07 DIAGNOSIS — M35.3 PMR (POLYMYALGIA RHEUMATICA) (HCC): ICD-10-CM

## 2022-04-07 DIAGNOSIS — G47.00 INSOMNIA, UNSPECIFIED TYPE: ICD-10-CM

## 2022-04-07 PROCEDURE — 99214 OFFICE O/P EST MOD 30 MIN: CPT | Performed by: STUDENT IN AN ORGANIZED HEALTH CARE EDUCATION/TRAINING PROGRAM

## 2022-04-07 ASSESSMENT — FIBROSIS 4 INDEX: FIB4 SCORE: 1.66

## 2022-04-07 NOTE — PROGRESS NOTES
Subjective:     CC: COPD, polymyalgia rheumatica    HPI:   Vieth presents today with      COPD  Previous chest x-ray shows lungs are hyperinflated with flattened diaphragms.  Recent PFTs show FEV1/FVC ratio of 80%.  Patient uses albuterol only as needed for allergies.    Polymyalgia rheumatica  Patient continues to follow with Dr. Bryant at rheumatology.  Patient off prednisone.     Hypertension  Blood pressure controlled in office today 116/72. Patient continues on hydrochlorothiazide and losartan.      Cerebral atrophy  CT of head in 2013 showed cerebral atrophy.     Atherosclerosis of aorta  Atherosclerosis of aorta noted on imaging in 2013.  Continue to control blood pressure and cholesterol.  Patient taking lovastatin 40 mg.      Insomnia  Patient has been taking Ambien nightly since approximately 1990s.  Patient has been advised of the risk of this medication.  She continues to note insomnia despite taking this medication.  Patient has been advised that this medication is not preferred for insomnia in geriatric patients.  Patient advised of risk of this medication.  No aberrant or addictive use of medications has been observed.  No adverse events have been reported.  Patient counseled to keep medications locked up or under personal control.  [facility] [employer] of pharmacy interface is reviewed.  No inconsistencies are found.     Ortho  Patient following with Dr. Damon for lower back pain.  Patient notes that back is tight.  Chronic problem.      Vascular insufficiency  Patient presents today for concern about bilateral leg pain.  Patient notes that her legs are tender to touch and erythematous.    ROS:  Gen: no fevers/chills  Pulm: no sob, no cough  CV: no chest pain, no palpitations  GI: no nausea/vomiting, no diarrhea  Neuro: no headaches      Objective:     Exam:  /72 (BP Location: Right arm, Patient Position: Sitting, BP Cuff Size: Adult)   Pulse 87   Temp 36.4 °C (97.6 °F) (Temporal)   Resp 16   Ht 1.6  "m (5' 3\")   Wt 82.6 kg (182 lb)   SpO2 95%   BMI 32.24 kg/m²  Body mass index is 32.24 kg/m².    Gen: Alert and oriented, No apparent distress.  Neck: Neck is supple without lymphadenopathy.  Lungs: Normal effort, CTA bilaterally, no wheezes, rhonchi, or rales  CV: Regular rate and rhythm. No murmurs, rubs, or gallops.  Ext: No clubbing, cyanosis, edema.    Assessment & Plan:     88 y.o. female with the following -     1. Bilateral leg pain  - Referral to Vascular Surgery    2. Hypertension, unspecified type  Chronic, stable.  Blood pressure controlled today.  Patient continues on losartan and hydrochlorothiazide.  - Comp Metabolic Panel; Future  - CBC WITHOUT DIFFERENTIAL; Future  - HEMOGLOBIN A1C; Future  - TSH; Future  - Lipid Profile; Future    3. IFG (impaired fasting glucose)  - HEMOGLOBIN A1C; Future    4. Insomnia, unspecified type  Chronic, stable.  Patient advised of risk of this medication.  Patient would like to continue on this medication.  Despite risks.  - zolpidem (AMBIEN) 5 MG Tab; Take 1 Tablet by mouth at bedtime as needed for Sleep for up to 90 days.  Dispense: 90 Tablet; Refill: 0    5. Chronic bilateral low back pain without sciatica  Chronic, stable.  Patient continues to follow with Ortho.    6. PMR (polymyalgia rheumatica) (HCC)  Chronic, stable.  Patient continues to follow with rheumatology.  Patient is no longer taking prednisone.    7. Chronic obstructive pulmonary disease, unspecified COPD type (HCC)  Chronic, stable.  Patient uses albuterol as needed.    8. Sedative hypnotic or anxiolytic dependence (HCC)  Chronic, stable.  PDMP reviewed.      No follow-ups on file.    Please note that this dictation was created using voice recognition software. I have made every reasonable attempt to correct obvious errors, but I expect that there are errors of grammar and possibly content that I did not discover before finalizing the note.      "

## 2022-04-11 RX ORDER — ZOLPIDEM TARTRATE 5 MG/1
5 TABLET ORAL NIGHTLY PRN
Qty: 90 TABLET | Refills: 0 | Status: SHIPPED | OUTPATIENT
Start: 2022-04-17 | End: 2022-07-16

## 2022-05-24 DIAGNOSIS — G47.00 INSOMNIA, UNSPECIFIED TYPE: ICD-10-CM

## 2022-05-24 RX ORDER — ALBUTEROL SULFATE 90 UG/1
AEROSOL, METERED RESPIRATORY (INHALATION)
Qty: 8.5 G | Refills: 2 | Status: SHIPPED | OUTPATIENT
Start: 2022-05-24 | End: 2022-05-31 | Stop reason: SDUPTHER

## 2022-05-24 RX ORDER — ZOLPIDEM TARTRATE 5 MG/1
5 TABLET ORAL NIGHTLY PRN
Qty: 90 TABLET | Refills: 0 | OUTPATIENT
Start: 2022-05-24 | End: 2022-08-22

## 2022-05-24 NOTE — TELEPHONE ENCOUNTER
----- Message from Pooja Orosco sent at 5/24/2022  9:33 AM PDT -----  Regarding: Med Refill  Iveth says her albuterol is running low. The pharmacy said they would also call for a refill. thanks

## 2022-05-31 RX ORDER — ALBUTEROL SULFATE 90 UG/1
AEROSOL, METERED RESPIRATORY (INHALATION)
Qty: 8.5 G | Refills: 2 | Status: SHIPPED | OUTPATIENT
Start: 2022-05-31 | End: 2023-01-05

## 2022-07-19 ENCOUNTER — TELEPHONE (OUTPATIENT)
Dept: MEDICAL GROUP | Facility: MEDICAL CENTER | Age: 87
End: 2022-07-19
Payer: MEDICARE

## 2022-07-19 DIAGNOSIS — F13.20 SEDATIVE HYPNOTIC OR ANXIOLYTIC DEPENDENCE (HCC): ICD-10-CM

## 2022-07-19 DIAGNOSIS — G47.00 INSOMNIA, UNSPECIFIED TYPE: ICD-10-CM

## 2022-07-19 NOTE — TELEPHONE ENCOUNTER
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

## 2022-07-20 RX ORDER — ZOLPIDEM TARTRATE 5 MG/1
5 TABLET ORAL NIGHTLY PRN
Qty: 30 TABLET | Refills: 0 | Status: SHIPPED | OUTPATIENT
Start: 2022-07-20 | End: 2022-07-22 | Stop reason: SDUPTHER

## 2022-07-21 ENCOUNTER — TELEPHONE (OUTPATIENT)
Dept: MEDICAL GROUP | Facility: MEDICAL CENTER | Age: 87
End: 2022-07-21
Payer: MEDICARE

## 2022-07-21 NOTE — TELEPHONE ENCOUNTER
ESTABLISHED PATIENT PRE-VISIT PLANNING     Annual Wellness Visit Never Done per Health Maintenance    Phone Number Called: [phone] (home)   Call outcome: Spoke to patient regarding message below.  Message: Appointment scheduled with Provider DELANEY Vanegas, Friday, 7/22/2022, Check-in: 11:05 AM, [address], [id number].      Patient was contacted to complete PVP.     Note: Patient will not be contacted if there is no indication to call.     1.  Reviewed notes from the last few office visits within the medical group: Yes    2.  If any orders were placed at last visit or intended to be done for this visit (i.e. 6 mos follow-up), do we have Results/Consult Notes?         •  Labs - Labs ordered, NOT completed. Patient advised to complete prior to next appointment.    -Fasting Labs: 8-10 hours  Note: If patient appointment is for lab review and patient did not complete labs, check with provider if OK to reschedule patient until labs completed.       •  Imaging - Imaging was not ordered at last office visit.       •  Referrals - Referral ordered, patient has NOT been seen.    -Vascular Surgery: Called and spoke to [facility]. Per their office, referral received. They called patient three times, and on third attempt, patient advised will callback when ready to schedule. Referral Status Authorized.    3. Is this appointment scheduled as a Hospital Follow-Up? No    4.  Immunizations were updated in Epic using Reconcile Outside Information activity? Yes    5.  Patient is due for the following Health Maintenance Topics:   Health Maintenance Due   Topic Date Due   • Annual Wellness Visit  Never done   • IMM ZOSTER VACCINES (1 of 2) Never done   • BONE DENSITY  Never done       6.  AHA (Pulse8) form printed for Provider? No, patient does not have any open alerts COMPLIANT

## 2022-07-22 ENCOUNTER — OFFICE VISIT (OUTPATIENT)
Dept: MEDICAL GROUP | Facility: MEDICAL CENTER | Age: 87
End: 2022-07-22
Payer: MEDICARE

## 2022-07-22 VITALS
TEMPERATURE: 96.9 F | WEIGHT: 175 LBS | HEIGHT: 63 IN | HEART RATE: 80 BPM | BODY MASS INDEX: 31.01 KG/M2 | SYSTOLIC BLOOD PRESSURE: 106 MMHG | OXYGEN SATURATION: 95 % | DIASTOLIC BLOOD PRESSURE: 66 MMHG

## 2022-07-22 DIAGNOSIS — E66.9 OBESITY (BMI 30.0-34.9): ICD-10-CM

## 2022-07-22 DIAGNOSIS — M35.3 PMR (POLYMYALGIA RHEUMATICA) (HCC): ICD-10-CM

## 2022-07-22 DIAGNOSIS — F13.20 SEDATIVE HYPNOTIC OR ANXIOLYTIC DEPENDENCE (HCC): ICD-10-CM

## 2022-07-22 DIAGNOSIS — M54.50 CHRONIC BILATERAL LOW BACK PAIN WITHOUT SCIATICA: ICD-10-CM

## 2022-07-22 DIAGNOSIS — I10 HYPERTENSION, UNSPECIFIED TYPE: ICD-10-CM

## 2022-07-22 DIAGNOSIS — G89.29 CHRONIC BILATERAL LOW BACK PAIN WITHOUT SCIATICA: ICD-10-CM

## 2022-07-22 DIAGNOSIS — G47.00 INSOMNIA, UNSPECIFIED TYPE: ICD-10-CM

## 2022-07-22 PROBLEM — J44.9 COPD (CHRONIC OBSTRUCTIVE PULMONARY DISEASE) (HCC): Status: RESOLVED | Noted: 2021-12-08 | Resolved: 2022-07-22

## 2022-07-22 PROCEDURE — 99214 OFFICE O/P EST MOD 30 MIN: CPT | Performed by: STUDENT IN AN ORGANIZED HEALTH CARE EDUCATION/TRAINING PROGRAM

## 2022-07-22 RX ORDER — METHOCARBAMOL 750 MG/1
750 TABLET, FILM COATED ORAL
COMMUNITY
Start: 2022-01-14

## 2022-07-22 RX ORDER — ZOLPIDEM TARTRATE 5 MG/1
5 TABLET ORAL NIGHTLY PRN
Qty: 90 TABLET | Refills: 0 | Status: SHIPPED | OUTPATIENT
Start: 2022-08-20 | End: 2022-08-25

## 2022-07-22 RX ORDER — PREDNISONE 2.5 MG/1
2.5 TABLET ORAL
COMMUNITY
Start: 2022-06-29 | End: 2022-10-06 | Stop reason: DRUGHIGH

## 2022-07-22 ASSESSMENT — FIBROSIS 4 INDEX: FIB4 SCORE: 1.68

## 2022-07-22 NOTE — PROGRESS NOTES
Subjective:     CC: Insomnia, vascular insufficiency follow-up    HPI:   Iveth presents today with    Insomnia  Patient has been taking Ambien nightly since approximately 1990s.  Patient has been advised of the risk of this medication.  She continues to note insomnia despite taking this medication.  Patient has been advised that this medication is not preferred for insomnia in geriatric patients.  Patient advised of risk of this medication.  Patient notes that she is careful to avoid falls.  Patient will try and take a half dose of the medication when possible to reduce amount.    No aberrant or addictive use of medications has been observed.  No adverse events have been reported.  Patient counseled to keep medications locked up or under personal control.  [facility] [employer] of pharmacy interface is reviewed.  No inconsistencies are found.     Asthma  Patient continues with albuterol as needed for allergies.  Patient taking Mucinex for recent congestion and allergies.  Notes no new concerns today.     Vascular insufficiency  Patient presents today for concern about bilateral leg pain.  Patient notes that her legs are tender to touch and erythematous.  Patient notes that legs are feeling well today.  Patient did not follow-up with vein due to no swelling and improvement in pain.    Polymyalgia rheumatica  Patient previously following with Dr. Bryant for rheumatology.  Patient notes that he closed his practice and referred him to [facility] rheumatology.  Scheduled with an appointment 10/6/2022.  Patient is back on prednisone, states that when she stopped prednisone she started having flare of polymyalgia rheumatica.  Patient notes that she feels stable on the 2.5 mg dose.    Ortho  Patient with chronic back pain.  Patient has been using methocarbamol as needed for reduction in back pain.          ROS:  Gen: no fevers/chills  Pulm: no sob, no cough  CV: no chest pain, no palpitations  GI: no nausea/vomiting, no  "diarrhea        Objective:     Exam:  /66 (BP Location: Right arm, Patient Position: Sitting, BP Cuff Size: Adult)   Pulse 80   Temp 36.1 °C (96.9 °F) (Temporal)   Ht 1.6 m (5' 3\")   Wt 79.4 kg (175 lb)   SpO2 95%   BMI 31.00 kg/m²  Body mass index is 31 kg/m².    Gen: Alert and oriented, No apparent distress.  Neck: Neck is supple without lymphadenopathy.  Lungs: Normal effort, CTA bilaterally, no wheezes, rhonchi, or rales  CV: Regular rate and rhythm. No murmurs, rubs, or gallops.  Ext: No clubbing, cyanosis, edema.    Assessment & Plan:     89 y.o. female with the following -     1. Chronic bilateral low back pain without sciatica  Chronic, stable.  Patient following with Ortho.  Patient continues to use methocarbamol as needed.    2. PMR (polymyalgia rheumatica) (HCC)  Chronic, stable.  Patient referred to new rheumatologist.  Patient stable on prednisone 2.5 mg.    3. Obesity (BMI 30.0-34.9)  Chronic, stable.  Patient's BMI elevated at 31.    4. Sedative hypnotic or anxiolytic dependence (HCC)  - zolpidem (AMBIEN) 5 MG Tab; Take 1 Tablet by mouth at bedtime as needed for Sleep for up to 90 days.  Dispense: 90 Tablet; Refill: 0    5. Insomnia, unspecified type  - zolpidem (AMBIEN) 5 MG Tab; Take 1 Tablet by mouth at bedtime as needed for Sleep for up to 90 days.  Dispense: 90 Tablet; Refill: 0    6. Hypertension, unspecified type  Chronic, stable.  Blood pressure controlled today.    Return in about 3 months (around 10/22/2022).    Please note that this dictation was created using voice recognition software. I have made every reasonable attempt to correct obvious errors, but I expect that there are errors of grammar and possibly content that I did not discover before finalizing the note.      "

## 2022-07-25 ENCOUNTER — PATIENT MESSAGE (OUTPATIENT)
Dept: HEALTH INFORMATION MANAGEMENT | Facility: OTHER | Age: 87
End: 2022-07-25

## 2022-09-22 ENCOUNTER — HOSPITAL ENCOUNTER (OUTPATIENT)
Dept: LAB | Facility: MEDICAL CENTER | Age: 87
End: 2022-09-22
Attending: STUDENT IN AN ORGANIZED HEALTH CARE EDUCATION/TRAINING PROGRAM
Payer: MEDICARE

## 2022-09-22 DIAGNOSIS — I10 HYPERTENSION, UNSPECIFIED TYPE: ICD-10-CM

## 2022-09-22 DIAGNOSIS — R73.01 IFG (IMPAIRED FASTING GLUCOSE): ICD-10-CM

## 2022-09-22 LAB
ALBUMIN SERPL BCP-MCNC: 4.1 G/DL (ref 3.2–4.9)
ALBUMIN/GLOB SERPL: 2 G/DL
ALP SERPL-CCNC: 79 U/L (ref 30–99)
ALT SERPL-CCNC: 13 U/L (ref 2–50)
ANION GAP SERPL CALC-SCNC: 9 MMOL/L (ref 7–16)
AST SERPL-CCNC: 17 U/L (ref 12–45)
BILIRUB SERPL-MCNC: 0.8 MG/DL (ref 0.1–1.5)
BUN SERPL-MCNC: 16 MG/DL (ref 8–22)
CALCIUM SERPL-MCNC: 9.4 MG/DL (ref 8.5–10.5)
CHLORIDE SERPL-SCNC: 99 MMOL/L (ref 96–112)
CHOLEST SERPL-MCNC: 202 MG/DL (ref 100–199)
CO2 SERPL-SCNC: 30 MMOL/L (ref 20–33)
CREAT SERPL-MCNC: 0.95 MG/DL (ref 0.5–1.4)
ERYTHROCYTE [DISTWIDTH] IN BLOOD BY AUTOMATED COUNT: 47.1 FL (ref 35.9–50)
EST. AVERAGE GLUCOSE BLD GHB EST-MCNC: 146 MG/DL
FASTING STATUS PATIENT QL REPORTED: NORMAL
GFR SERPLBLD CREATININE-BSD FMLA CKD-EPI: 57 ML/MIN/1.73 M 2
GLOBULIN SER CALC-MCNC: 2.1 G/DL (ref 1.9–3.5)
GLUCOSE SERPL-MCNC: 105 MG/DL (ref 65–99)
HBA1C MFR BLD: 6.7 % (ref 4–5.6)
HCT VFR BLD AUTO: 46 % (ref 37–47)
HDLC SERPL-MCNC: 64 MG/DL
HGB BLD-MCNC: 15.3 G/DL (ref 12–16)
LDLC SERPL CALC-MCNC: 101 MG/DL
MCH RBC QN AUTO: 29.5 PG (ref 27–33)
MCHC RBC AUTO-ENTMCNC: 33.3 G/DL (ref 33.6–35)
MCV RBC AUTO: 88.8 FL (ref 81.4–97.8)
PLATELET # BLD AUTO: 353 K/UL (ref 164–446)
PMV BLD AUTO: 10.3 FL (ref 9–12.9)
POTASSIUM SERPL-SCNC: 3.6 MMOL/L (ref 3.6–5.5)
PROT SERPL-MCNC: 6.2 G/DL (ref 6–8.2)
RBC # BLD AUTO: 5.18 M/UL (ref 4.2–5.4)
SODIUM SERPL-SCNC: 138 MMOL/L (ref 135–145)
TRIGL SERPL-MCNC: 187 MG/DL (ref 0–149)
TSH SERPL DL<=0.005 MIU/L-ACNC: 2.07 UIU/ML (ref 0.38–5.33)
WBC # BLD AUTO: 9.1 K/UL (ref 4.8–10.8)

## 2022-09-22 PROCEDURE — 80061 LIPID PANEL: CPT

## 2022-09-22 PROCEDURE — 80053 COMPREHEN METABOLIC PANEL: CPT

## 2022-09-22 PROCEDURE — 36415 COLL VENOUS BLD VENIPUNCTURE: CPT

## 2022-09-22 PROCEDURE — 85027 COMPLETE CBC AUTOMATED: CPT

## 2022-09-22 PROCEDURE — 83036 HEMOGLOBIN GLYCOSYLATED A1C: CPT

## 2022-09-22 PROCEDURE — 84443 ASSAY THYROID STIM HORMONE: CPT

## 2022-09-23 DIAGNOSIS — F13.20 SEDATIVE HYPNOTIC OR ANXIOLYTIC DEPENDENCE (HCC): ICD-10-CM

## 2022-09-23 DIAGNOSIS — G47.00 INSOMNIA, UNSPECIFIED TYPE: ICD-10-CM

## 2022-09-23 RX ORDER — ZOLPIDEM TARTRATE 5 MG/1
5 TABLET ORAL
Qty: 30 TABLET | Refills: 0 | Status: SHIPPED | OUTPATIENT
Start: 2022-09-23 | End: 2022-09-30 | Stop reason: SDUPTHER

## 2022-09-26 ENCOUNTER — TELEPHONE (OUTPATIENT)
Dept: MEDICAL GROUP | Facility: MEDICAL CENTER | Age: 87
End: 2022-09-26
Payer: MEDICARE

## 2022-09-28 ENCOUNTER — TELEPHONE (OUTPATIENT)
Dept: MEDICAL GROUP | Facility: MEDICAL CENTER | Age: 87
End: 2022-09-28
Payer: MEDICARE

## 2022-09-28 DIAGNOSIS — F13.20 SEDATIVE HYPNOTIC OR ANXIOLYTIC DEPENDENCE (HCC): ICD-10-CM

## 2022-09-28 DIAGNOSIS — G47.00 INSOMNIA, UNSPECIFIED TYPE: ICD-10-CM

## 2022-09-28 NOTE — TELEPHONE ENCOUNTER
[facility] [address] needs clarification on patient's Zolpidem prescription whether patient is supposed to take it up to 60 days or 30? Sig is witting as both... thank you

## 2022-09-30 RX ORDER — ZOLPIDEM TARTRATE 5 MG/1
5 TABLET ORAL
Qty: 30 TABLET | Refills: 0 | Status: SHIPPED | OUTPATIENT
Start: 2022-09-30 | End: 2022-10-07 | Stop reason: SDUPTHER

## 2022-10-03 ASSESSMENT — RHEUMATOLOGY NEW PATIENT QUESTIONNAIRE
HAIR LOSS WITH BALD SPOTS: N
HELPFUL_MEDICATIONS: PREDNIZONE
HAIR SHEDDING: N
NIGHT SWEATS: N
FEVERS: N
SKIN THICKENING: N
CHIEF COMPLAINT: PMR
CHILLS: N

## 2022-10-06 ENCOUNTER — OFFICE VISIT (OUTPATIENT)
Dept: RHEUMATOLOGY | Facility: MEDICAL CENTER | Age: 87
End: 2022-10-06
Attending: STUDENT IN AN ORGANIZED HEALTH CARE EDUCATION/TRAINING PROGRAM
Payer: MEDICARE

## 2022-10-06 VITALS
HEART RATE: 95 BPM | OXYGEN SATURATION: 93 % | DIASTOLIC BLOOD PRESSURE: 60 MMHG | BODY MASS INDEX: 31.1 KG/M2 | SYSTOLIC BLOOD PRESSURE: 118 MMHG | TEMPERATURE: 97.4 F | WEIGHT: 182.2 LBS | HEIGHT: 64 IN | RESPIRATION RATE: 16 BRPM

## 2022-10-06 DIAGNOSIS — M75.32 CALCIFIC TENDINITIS OF BOTH SHOULDERS: ICD-10-CM

## 2022-10-06 DIAGNOSIS — M35.3 POLYMYALGIA RHEUMATICA (HCC): ICD-10-CM

## 2022-10-06 DIAGNOSIS — M19.041 PRIMARY OSTEOARTHRITIS OF BOTH HANDS: ICD-10-CM

## 2022-10-06 DIAGNOSIS — Z79.52 LONG TERM CURRENT USE OF SYSTEMIC STEROIDS: ICD-10-CM

## 2022-10-06 DIAGNOSIS — M75.31 CALCIFIC TENDINITIS OF BOTH SHOULDERS: ICD-10-CM

## 2022-10-06 DIAGNOSIS — M19.042 PRIMARY OSTEOARTHRITIS OF BOTH HANDS: ICD-10-CM

## 2022-10-06 PROCEDURE — 99204 OFFICE O/P NEW MOD 45 MIN: CPT | Performed by: STUDENT IN AN ORGANIZED HEALTH CARE EDUCATION/TRAINING PROGRAM

## 2022-10-06 PROCEDURE — 99212 OFFICE O/P EST SF 10 MIN: CPT | Performed by: STUDENT IN AN ORGANIZED HEALTH CARE EDUCATION/TRAINING PROGRAM

## 2022-10-06 RX ORDER — PREDNISONE 1 MG/1
TABLET ORAL
Qty: 70 TABLET | Refills: 0 | Status: SHIPPED | OUTPATIENT
Start: 2022-10-06 | End: 2022-12-01

## 2022-10-06 ASSESSMENT — FIBROSIS 4 INDEX: FIB4 SCORE: 1.19

## 2022-10-06 NOTE — PROGRESS NOTES
[facility] RHEUMATOLOGY  [address], [address], [place] [address]  Phone: [phone] ? Fax: [phone]    RHEUMATOLOGY NEW PATIENT VISIT NOTE      DATE OF SERVICE: 10/06/2022    REFERRING PROVIDER:  Scott Bryant M.D.  [address] [place] [address]    PATIENT IDENTIFICATION:  Iveth Martin  [address]  [place] [address]    YOB: 1933    MEDICAL RECORD NUMBER: [id number]      SUBJECTIVE:     CHIEF COMPLAINT:   Chief Complaint   Patient presents with    New Patient     Polymyalgia rheumatica       HISTORY OF PRESENT ILLNESS:  Iveth Martin is a 89 y.o. female with pertinent history notable for polymyalgia rheumatica diagnosed in 10/2021 (reportedly symptomatic since 6/2021 managed by PCP until rheumatology evaluation), calcific tendinopathy of bilateral rotator cuffs, osteoarthritis of hands, trigger fingers s/p left third/fourth finger releases, CTS s/p bilateral carpal tunnel releases, DJD/DDD of cervical/thoracic/lumbar spines, and CKD among other comorbidities. Previously under the care of a local rheumatologist who has retired (Dr. Scott Bryant), she presents to establish care for continued evaluation and management of her condition. Reports waxing/waning joint/muscle pain in her hands (particularly her right fifth DIP joint very tender/swollen for a couple of months), shoulders, and lower back, and over 1 hour of morning stiffness that improves with activity. Notes that her joint pain tends to worsen with activity especially in her hands as she is a long-term .    Pertinent treatment history as of 10/2022: Prednisone 15 mg taper to 2.5 mg daily (6/2022-present, helpful), gabapentin 100 mg TID.  Pertinent lab results as of 9/2022: Positive TONY with borderline anti-dsDNA 10 (in 10/2021); negative/normal RF and ACCP (in 10/2021), ESR and CRP in 3/2022 (previously elevated CRP 35 with normal ESR in 10/2021), TSH, LFT, and unremarkable CBC.  Pertinent imaging  results as of 8/2021 XR chest: Bilateral rotator cuff calcifications consistent with calcific tendinopathy.    REVIEW OF SYSTEMS:  Except as noted in the history above, relevant review of systems with emphasis on autoimmune inflammatory processes was otherwise negative.      ACTIVE PROBLEM LIST:  Patient Active Problem List   Diagnosis    Atherosclerosis of aorta (HCC)    Cerebral atrophy (HCC)    Obesity (BMI 30.0-34.9)    Gastroesophageal reflux disease    Hypertension    Polymyalgia rheumatica (HCC)    Chronic bilateral back pain    Insomnia    Sedative hypnotic or anxiolytic dependence (HCC)    Abnormal results of kidney function studies    Leukocytosis    Vitamin D deficiency    Thrombocytosis    Prediabetes    NAFLD (nonalcoholic fatty liver disease)    Low iron    Hypertensive renal disease    Hypercholesterolemia    Elevated antibody levels    CRP elevated    Chronic kidney disease, stage 3a (HCC)    Arthralgia    Long term current use of systemic steroids    Calcific tendinitis of both shoulders    Primary osteoarthritis of both hands   No problem-specific Assessment & Plan notes found for this encounter.      PAST MEDICAL HISTORY:  Past Medical History:   Diagnosis Date    Hyperlipidemia     Hypertension        PAST SURGICAL HISTORY:  Past Surgical History:   Procedure Laterality Date    PB WRIST ARTHROSCOP,RELEASE XVERS LIG Left 8/11/2021    Procedure: LEFT ENDOSCOPIC CARPAL TUNNEL RELEASE;  Surgeon: Pee Khan M.D.;  Location: [facility];  Service: Orthopedics    PB INCISE FINGER TENDON SHEATH Left 8/11/2021    Procedure: LEFT LONG AND RING TRIGGER FINGER RELEASE;  Surgeon: Pee Khan M.D.;  Location: [facility];  Service: Orthopedics       MEDICATIONS:  Current Outpatient Medications   Medication Sig Dispense Refill    predniSONE (DELTASONE) 1 MG Tab Take 2 Tablets by mouth every day for 14 days, THEN 1.5 Tablets every day for 14 days, THEN 1  "Tablet every day for 14 days, THEN 0.5 Tablets every day for 14 days. 70 Tablet 0    zolpidem (AMBIEN) 5 MG Tab Take 1 Tablet by mouth at bedtime as needed for Sleep for up to 30 days. For up to 30 days 30 Tablet 0    methocarbamol (ROBAXIN) 750 MG Tab       hydroCHLOROthiazide (HYDRODIURIL) 25 MG Tab TAKE 1 TABLET BY MOUTH EVERY DAY AS NEEDED 90 Tablet 3    albuterol 108 (90 Base) MCG/ACT Aero Soln inhalation aerosol INHALE 2 PUFFS BY INHALATION ROUTE EVERY 4-6 HRS AS NEEDED 8.5 g 2    lovastatin (MEVACOR) 40 MG tablet TAKE 1 TABLET BY MOUTH EVERY DAY IN THE EVENING 30 Tablet 11    gabapentin (NEURONTIN) 100 MG Cap Take 100 mg by mouth 3 times a day.      Cholecalciferol (VITAMIN D3) 2000 UNIT Cap Take 2 Capsules by mouth every day.      chlorpheniramine (CHLOR-TRIMETRON) 4 MG Tab Take 4 mg by mouth every day. Second dose in the evening PRN      losartan (COZAAR) 50 MG Tab Take 50 mg by mouth every day.      omeprazole (PRILOSEC) 40 MG delayed-release capsule Take 40 mg by mouth 2 (two) times a day.        Dextromethorphan-Guaifenesin (MUCINEX DM PO) Take 1 Tablet by mouth 1 time a day as needed (Mucus).         No current facility-administered medications for this visit.       ALLERGIES:   Allergies   Allergen Reactions    Celebrex [Celecoxib] Unspecified     GERD reaction    Lisinopril Cough    Nsaids Unspecified     GERD reaction    Other Drug Unspecified     bioxin    Benadryl Allergy Unspecified     \"tongue gets fuzzy and feel like I am drugged\"       IMMUNIZATIONS:  Immunization History   Administered Date(s) Administered    Influenza Seasonal Injectable - Historical Data 10/15/2013    Influenza Vaccine Adult HD 09/22/2014, 09/16/2020    Influenza Vaccine Pediatric Split - Historical Data 09/24/2010    Influenza, Unspecified - HISTORICAL DATA 10/15/2019, 10/21/2021    MODERNA SARS-COV-2 VACCINE (12+) 02/03/2021, 03/03/2021, 11/03/2021    PFIZER ASTUDILLO CAP SARS-COV-2 VACCINATION (12+) 04/22/2022    Pneumococcal " "Conjugate Vaccine (Prevnar/PCV-13) 2014    Pneumococcal polysaccharide vaccine (PPSV-23) 2018    Tdap Vaccine 2013       SOCIAL HISTORY:   Social History     Tobacco Use    Smoking status: Former     Packs/day: 1.00     Years: 30.00     Pack years: 30.00     Types: Cigarettes     Quit date:      Years since quittin.7    Smokeless tobacco: Never   Vaping Use    Vaping Use: Never used   Substance Use Topics    Alcohol use: Yes     Comment: rare    Drug use: No       FAMILY HISTORY:  Family History   Problem Relation Age of Onset    Cancer Father     Heart Disease Father         OBJECTIVE:     Vital Signs: /60 (BP Location: Right arm, Patient Position: Sitting, BP Cuff Size: Large adult)   Pulse 95   Temp 36.3 °C (97.4 °F) (Temporal)   Resp 16   Ht 1.613 m (5' 3.5\")   Wt 82.6 kg (182 lb 3.2 oz)   SpO2 93% Body mass index is 31.77 kg/m².    General: Appears well and comfortable  Eyes: No scleral or conjunctival lesions  ENT: No apparent oral or nasal lesions  Head/Neck: No apparent scalp or neck lesions  Cardiovascular: Regular rate and rhythm; no pericardial rubs  Respiratory: Breathing quiet and unlabored; no rales or pleural rubs  Gastrointestinal: No organomegaly or abdominal masses  Integumentary: No significant cutaneous lesions or discolorations  Musculoskeletal: Hyperalgesic tenderness of right 5th DIP joint with swelling/warmth; poorly localized mild tenderness of both shoulders on range of motion  Neurologic: No focal sensory or motor deficits  Psychiatric: Mood and affect appropriate      LABORATORY RESULTS REVIEWED AND INTERPRETED BY ME:  Lab Results   Component Value Date/Time    SEDRATEWES 7 2022 10:12 AM    CREACTPROT 0.40 2022 10:12 AM     Lab Results   Component Value Date/Time    TSHULTRASEN 2.070 2022 07:49 AM     Lab Results   Component Value Date/Time    ASTSGOT 17 2022 07:49 AM    ALTSGPT 13 2022 07:49 AM    ALKPHOSPHAT 79 " 09/22/2022 07:49 AM    TBILIRUBIN 0.8 09/22/2022 07:49 AM    TOTPROTEIN 6.2 09/22/2022 07:49 AM    ALBUMIN 4.1 09/22/2022 07:49 AM     Lab Results   Component Value Date/Time    SODIUM 138 09/22/2022 07:49 AM    POTASSIUM 3.6 09/22/2022 07:49 AM    CHLORIDE 99 09/22/2022 07:49 AM    CO2 30 09/22/2022 07:49 AM    GLUCOSE 105 (H) 09/22/2022 07:49 AM    BUN 16 09/22/2022 07:49 AM    CREATININE 0.95 09/22/2022 07:49 AM    CREATININE 0.9 12/12/2007 12:30 PM    CALCIUM 9.4 09/22/2022 07:49 AM     Lab Results   Component Value Date/Time    WBC 9.1 09/22/2022 07:49 AM    RBC 5.18 09/22/2022 07:49 AM    HEMOGLOBIN 15.3 09/22/2022 07:49 AM    HEMATOCRIT 46.0 09/22/2022 07:49 AM    MCV 88.8 09/22/2022 07:49 AM    MCH 29.5 09/22/2022 07:49 AM    MCHC 33.3 (L) 09/22/2022 07:49 AM    RDW 47.1 09/22/2022 07:49 AM    PLATELETCT 353 09/22/2022 07:49 AM    MPV 10.3 09/22/2022 07:49 AM    NEUTS 12.72 (H) 12/24/2021 12:10 PM    LYMPHOCYTES 9.60 (L) 12/24/2021 12:10 PM    MONOCYTES 5.20 12/24/2021 12:10 PM    EOSINOPHILS 0.90 12/24/2021 12:10 PM    BASOPHILS 0.00 12/24/2021 12:10 PM    ANISOCYTOSIS 1+ 12/24/2021 12:10 PM     Lab Results   Component Value Date/Time    CHOLSTRLTOT 202 (H) 09/22/2022 07:49 AM     (H) 09/22/2022 07:49 AM    HDL 64 09/22/2022 07:49 AM    TRIGLYCERIDE 187 (H) 09/22/2022 07:49 AM    HBA1C 6.7 (H) 09/22/2022 07:49 AM       RADIOLOGY RESULTS REVIEWED AND INTERPRETED BY ME:  Results for orders placed in visit on 07/13/21    DX-HAND 3+ LEFT    Results for orders placed during the hospital encounter of 03/12/13    CT-LSPINE W/O PLUS RECONS    Impression  1. No acute fracture or dislocation lumbar spine.  Multilevel degenerative changes.  2. Grade 1 L4 anterolisthesis.  No definite pars defect.  3. Densities within the lumbar spine and visualized pelvis nonspecific and could be multiple bone islands.  Osteopoikilosis a consideration.  Metastatic disease cannot be excluded.  4. Moderate bulging of the L3 and  L4 disks.    Results for orders placed during the hospital encounter of 03/12/13    CT-TSPINE W/O PLUS RECONS    Impression  1. Mild anterior wedging of the sixth thoracic vertebral body that appears to be old.  Correlate clinically.  2. Heights and remainder of the vertebral bodies maintained.  Multilevel degenerative changes.  3. Posterior dome of the right lobe of the liver hypodensities are most likely hemangiomas and/or cysts.    Results for orders placed in visit on 01/08/13    MR-LUMBAR SPINE-W/O    Impression  1. Multilevel degenerative disk disease and facet degeneration which results in varying degrees of central canal and neural foraminal narrowing as specifically described above.    2. Grade 1 anterior subluxation at the L4-5 level.      All relevant laboratory and imaging results reported on this note were reviewed and interpreted by me.      ASSESSMENT AND PLAN:     Iveth Martin is a 89 y.o. female with history as noted above whose presentation merits the following diagnostic and clinical status impressions and recommendations:    1. Polymyalgia rheumatica (HCC)  Clinically low disease activity with no significant clinical evidence of evolving or impending flare as her reported waxing/waning bilateral shoulder pain is likely secondary to her calcific tendinopathy. Her reported couple of months of right fifth DIP joint pain/swelling confirmed on physical exam raises concern for possible inflammatory osteoarthritis versus spondylarthritis. That said, reasonable to undertake the additional work-up noted below for risk stratification based on which additional recommendations may be provided.  - Sed Rate  - CRP QUANTITIVE (NON-CARDIAC)  - HLA-B27  - predniSONE (DELTASONE) 1 MG Tab; Take 2 Tablets by mouth every day for 14 days, THEN 1.5 Tablets every day for 14 days, THEN 1 Tablet every day for 14 days, THEN 0.5 Tablets every day for 14 days.  Dispense: 70 Tablet; Refill: 0    2. Calcific tendinitis  of both shoulders  Presumably the underlying etiology of her bilateral waxing/waning shoulder pain.  - Consider steroid injection if desired    3. Primary osteoarthritis of both hands  Her reported couple of months of right fifth DIP joint pain/swelling confirmed on physical exam raises concern for possible inflammatory osteoarthritis versus spondylarthritis for which the additional work-up noted above is reasonable.  - Consider intra-articular steroid injection of right fifth DIP joint if desired    4. Long term current use of systemic steroids  Though she has been on low doses, cumulatively this puts her at some risk for osteopenia/osteoporosis.  - DS-BONE DENSITY STUDY (DEXA)  - Taper off prednisone as noted above  - Continue vitamin D supplementation with increased dietary calcium intake      FOLLOW-UP: Return in about 4 months (around 2/6/2023) for Short.           Thank you for the opportunity to participate in the care of Ivethpriya Martin.    Don Ulrich MD, MS  Rheumatologist, [facility] Rhe[facility]ology ? [facility]   of Clinical Medicine, Department of Internal Medicine  [facility] ? [facility]

## 2022-10-06 NOTE — PATIENT INSTRUCTIONS
AFTER VISIT INSTRUCTIONS    Below are important information to help you navigate your healthcare needs and help us serve you safely and effectively:  If laboratory tests and/or imaging studies were ordered, remember to go get them done.  If new prescriptions or refills were sent to the pharmacy, remember to go pick them up.  Take your medications exactly as prescribed unless instructed otherwise.  If there are significant findings on your lab tests and imaging studies that warrant further action, I will notify you with explanations via [employer] or phone call, otherwise you can view them on [employer] and let me know if you have any questions.  Sign up for FaceTags if you have not already done so, in order to have access to the results of your lab tests and imaging studies, and to be able to send and receive messages from me.  Note that FaceTags messages are typically read during office hours only and may take 1-7 days before a response depending on the urgency of the situation and how busy my schedule is.  In general, FaceTags messaging is for non-urgent matters that do not require immediate attention, so for urgent matters that cannot wait, you are advised to go to an urgent care.

## 2022-10-07 ENCOUNTER — OFFICE VISIT (OUTPATIENT)
Dept: MEDICAL GROUP | Facility: MEDICAL CENTER | Age: 87
End: 2022-10-07
Payer: MEDICARE

## 2022-10-07 ENCOUNTER — PATIENT OUTREACH (OUTPATIENT)
Dept: HEALTH INFORMATION MANAGEMENT | Facility: OTHER | Age: 87
End: 2022-10-07

## 2022-10-07 VITALS
SYSTOLIC BLOOD PRESSURE: 118 MMHG | DIASTOLIC BLOOD PRESSURE: 72 MMHG | HEIGHT: 64 IN | OXYGEN SATURATION: 93 % | TEMPERATURE: 98.2 F | WEIGHT: 181 LBS | HEART RATE: 79 BPM | RESPIRATION RATE: 16 BRPM | BODY MASS INDEX: 30.9 KG/M2

## 2022-10-07 DIAGNOSIS — N18.31 CHRONIC KIDNEY DISEASE, STAGE 3A: ICD-10-CM

## 2022-10-07 DIAGNOSIS — M35.3 POLYMYALGIA RHEUMATICA (HCC): ICD-10-CM

## 2022-10-07 DIAGNOSIS — G89.29 CHRONIC BILATERAL LOW BACK PAIN WITHOUT SCIATICA: ICD-10-CM

## 2022-10-07 DIAGNOSIS — G47.00 INSOMNIA, UNSPECIFIED TYPE: ICD-10-CM

## 2022-10-07 DIAGNOSIS — R73.03 PREDIABETES: ICD-10-CM

## 2022-10-07 DIAGNOSIS — I70.0 ATHEROSCLEROSIS OF AORTA (HCC): ICD-10-CM

## 2022-10-07 DIAGNOSIS — E66.9 OBESITY (BMI 30.0-34.9): ICD-10-CM

## 2022-10-07 DIAGNOSIS — Z79.52 LONG TERM CURRENT USE OF SYSTEMIC STEROIDS: ICD-10-CM

## 2022-10-07 DIAGNOSIS — F13.20 SEDATIVE HYPNOTIC OR ANXIOLYTIC DEPENDENCE (HCC): ICD-10-CM

## 2022-10-07 DIAGNOSIS — M54.50 CHRONIC BILATERAL LOW BACK PAIN WITHOUT SCIATICA: ICD-10-CM

## 2022-10-07 PROCEDURE — 99214 OFFICE O/P EST MOD 30 MIN: CPT | Performed by: STUDENT IN AN ORGANIZED HEALTH CARE EDUCATION/TRAINING PROGRAM

## 2022-10-07 RX ORDER — LOSARTAN POTASSIUM 50 MG/1
50 TABLET ORAL DAILY
COMMUNITY
Start: 2022-09-15 | End: 2022-10-07

## 2022-10-07 RX ORDER — ZOLPIDEM TARTRATE 5 MG/1
5 TABLET ORAL
Qty: 90 TABLET | Refills: 0 | Status: SHIPPED | OUTPATIENT
Start: 2022-10-23 | End: 2022-10-24 | Stop reason: SDUPTHER

## 2022-10-07 ASSESSMENT — FIBROSIS 4 INDEX: FIB4 SCORE: 1.19

## 2022-10-07 NOTE — PROGRESS NOTES
Subjective:     Chief Complaint   Patient presents with    Follow-Up     6 month          HPI:   Iveth presents today with     Polymyalgia rheumatica  Patient recently established with new rheumatologist at [facility].  Patient was started on a prednisone taper with goal of coming off prednisone.  Patient will get a bone density scan and labs to further evaluate.    Insomnia  Patient has been taking Ambien nightly since approximately 1990s.  Patient has been advised of the risk of this medication.  She continues to note insomnia despite taking this medication.  Patient has been advised that this medication is not preferred for insomnia in geriatric patients.  Patient advised of risk of this medication.  Patient notes that she is careful to avoid falls.  Patient will try and take a half dose of the medication when possible to reduce amount.     No aberrant or addictive use of medications has been observed.  No adverse events have been reported.  Patient counseled to keep medications locked up or under personal control.  [facility] of pharmacy interface is reviewed.  No inconsistencies are found.       Vascular insufficiency  Patient continues to note concern about bilateral leg pain.  Patient notes that her legs are tender to touch and erythematous.  Patient is on gabapentin 500 mg daily.  Patient plans to follow-up with [facility] [place] for further evaluation and to be fitted for compression socks.    Back pain  Notes that she is on gabapentin 500 mg and Robaxin 750 mg daily.  Patient is going to follow-up with orthopedic.    CKD  Patient's recent GFR at 57.  Patient advised avoid nephrotoxins and continue working on hydration.    Prediabetes  Patient's recent A1c elevated at 6.7%.  Discussed with patient that this is considered diabetes not prediabetes.  Patient is strongly encouraged to work on diet and exercise.  Patient notes that her diet consists largely of carbohydrates.  Discussed reduction.    Hypertension  Blood  "pressure controlled 118/72.  Patient continues on hydrochlorothiazide and losartan.    ROS:  Gen: no fevers/chill  Pulm: no sob, no cough  CV: no chest pain, no palpitations  GI: no nausea/vomiting, no diarrhea        Objective:     Exam:  /72 (BP Location: Right arm, Patient Position: Sitting, BP Cuff Size: Adult)   Pulse 79   Temp 36.8 °C (98.2 °F) (Temporal)   Resp 16   Ht 1.613 m (5' 3.5\")   Wt 82.1 kg (181 lb)   SpO2 93%   BMI 31.56 kg/m²  Body mass index is 31.56 kg/m².    Gen: Alert and oriented, No apparent distress.  Neck: Neck is supple without lymphadenopathy.  Lungs: Normal effort, CTA bilaterally, no wheezes, rhonchi, or rales  CV: Regular rate and rhythm. No murmurs, rubs, or gallops.  Ext: No clubbing, cyanosis, edema.      Assessment & Plan:     89 y.o. female with the following -     1. Sedative hypnotic or anxiolytic dependence (HCC)  - zolpidem (AMBIEN) 5 MG Tab; Take 1 Tablet by mouth at bedtime as needed for Sleep for up to 90 days. For up to 30 days  Dispense: 90 Tablet; Refill: 0    2. Insomnia, unspecified type  - zolpidem (AMBIEN) 5 MG Tab; Take 1 Tablet by mouth at bedtime as needed for Sleep for up to 90 days. For up to 30 days  Dispense: 90 Tablet; Refill: 0    3. Chronic kidney disease, stage 3a (HCC)  Chronic, stable.  This is a previous diagnosis.  Patient's last GFR was greater than 60 but recent GFR at 57.  Patient advised to avoid nephrotoxins and continue working on increasing hydration, controlling diabetes.    4. Prediabetes  Chronic, stable.  Patient's A1c is 6.7%.  Discussed with patient the importance of dietary changes.  Patient is on long-term steroids.    5. Obesity (BMI 30.0-34.9)  Chronic, stable.  Patient's BMI at 31.    6. Polymyalgia rheumatica (HCC)  Chronic, stable.  Following with rheumatology.    7. Long term current use of systemic steroids  Chronic, stable.  Following with rheumatology, working to reduce his prednisone.    8. Atherosclerosis of aorta " (HCC)  Chronic, stable.  Patient continues on blood pressure medication and lovastatin 40 mg.    9. Chronic bilateral low back pain without sciatica  Chronic, stable.  Patient going to follow-up with neurosurgeon.  Patient continues on gabapentin and methocarbamol.        Return in about 3 months (around 1/7/2023).    Please note that this dictation was created using voice recognition software. I have made every reasonable attempt to correct obvious errors, but I expect that there are errors of grammar and possibly content that I did not discover before finalizing the note.

## 2022-10-07 NOTE — PROGRESS NOTES
"Met patient in clinic after her PCP visit. Introduced patient to the Personal Care Management Program. Provided patient with PCM program brochure with this RN's name and program phone number . Explained benefits of program to patient. Patient would like to \"think about it\" as she is not sure if she needs it at this time. Patient will call this RN next week if she decides to enroll.   "

## 2022-10-13 ENCOUNTER — HOSPITAL ENCOUNTER (OUTPATIENT)
Dept: LAB | Facility: MEDICAL CENTER | Age: 87
End: 2022-10-13
Attending: STUDENT IN AN ORGANIZED HEALTH CARE EDUCATION/TRAINING PROGRAM
Payer: MEDICARE

## 2022-10-13 LAB
CRP SERPL HS-MCNC: <0.3 MG/DL (ref 0–0.75)
ERYTHROCYTE [SEDIMENTATION RATE] IN BLOOD BY WESTERGREN METHOD: 7 MM/HOUR (ref 0–25)

## 2022-10-13 PROCEDURE — 36415 COLL VENOUS BLD VENIPUNCTURE: CPT

## 2022-10-13 PROCEDURE — 85652 RBC SED RATE AUTOMATED: CPT

## 2022-10-13 PROCEDURE — 86812 HLA TYPING A B OR C: CPT

## 2022-10-13 PROCEDURE — 86140 C-REACTIVE PROTEIN: CPT

## 2022-10-14 ENCOUNTER — PATIENT OUTREACH (OUTPATIENT)
Dept: HEALTH INFORMATION MANAGEMENT | Facility: OTHER | Age: 87
End: 2022-10-14
Payer: MEDICARE

## 2022-10-14 DIAGNOSIS — N18.31 CHRONIC KIDNEY DISEASE, STAGE 3A: ICD-10-CM

## 2022-10-15 LAB — HLA-B27 QL FC: NEGATIVE

## 2022-10-18 ENCOUNTER — PATIENT OUTREACH (OUTPATIENT)
Dept: HEALTH INFORMATION MANAGEMENT | Facility: OTHER | Age: 87
End: 2022-10-18

## 2022-10-18 ENCOUNTER — PATIENT OUTREACH (OUTPATIENT)
Dept: HEALTH INFORMATION MANAGEMENT | Facility: OTHER | Age: 87
End: 2022-10-18
Payer: MEDICARE

## 2022-10-18 DIAGNOSIS — N18.31 CHRONIC KIDNEY DISEASE, STAGE 3A: ICD-10-CM

## 2022-10-18 PROCEDURE — 99490 CHRNC CARE MGMT STAFF 1ST 20: CPT | Performed by: STUDENT IN AN ORGANIZED HEALTH CARE EDUCATION/TRAINING PROGRAM

## 2022-10-18 PROCEDURE — 99439 CHRNC CARE MGMT STAF EA ADDL: CPT | Performed by: STUDENT IN AN ORGANIZED HEALTH CARE EDUCATION/TRAINING PROGRAM

## 2022-10-18 SDOH — ECONOMIC STABILITY: FOOD INSECURITY: WITHIN THE PAST 12 MONTHS, THE FOOD YOU BOUGHT JUST DIDN'T LAST AND YOU DIDN'T HAVE MONEY TO GET MORE.: NEVER TRUE

## 2022-10-18 SDOH — HEALTH STABILITY: PHYSICAL HEALTH: ON AVERAGE, HOW MANY MINUTES DO YOU ENGAGE IN EXERCISE AT THIS LEVEL?: 0 MIN

## 2022-10-18 SDOH — ECONOMIC STABILITY: HOUSING INSECURITY
IN THE LAST 12 MONTHS, WAS THERE A TIME WHEN YOU DID NOT HAVE A STEADY PLACE TO SLEEP OR SLEPT IN A SHELTER (INCLUDING NOW)?: NO

## 2022-10-18 SDOH — ECONOMIC STABILITY: HOUSING INSECURITY: IN THE LAST 12 MONTHS, HOW MANY PLACES HAVE YOU LIVED?: 1

## 2022-10-18 SDOH — ECONOMIC STABILITY: INCOME INSECURITY: IN THE LAST 12 MONTHS, WAS THERE A TIME WHEN YOU WERE NOT ABLE TO PAY THE MORTGAGE OR RENT ON TIME?: NO

## 2022-10-18 SDOH — ECONOMIC STABILITY: FOOD INSECURITY: WITHIN THE PAST 12 MONTHS, YOU WORRIED THAT YOUR FOOD WOULD RUN OUT BEFORE YOU GOT MONEY TO BUY MORE.: NEVER TRUE

## 2022-10-18 SDOH — HEALTH STABILITY: PHYSICAL HEALTH: ON AVERAGE, HOW MANY DAYS PER WEEK DO YOU ENGAGE IN MODERATE TO STRENUOUS EXERCISE (LIKE A BRISK WALK)?: 0 DAYS

## 2022-10-18 SDOH — ECONOMIC STABILITY: TRANSPORTATION INSECURITY
IN THE PAST 12 MONTHS, HAS THE LACK OF TRANSPORTATION KEPT YOU FROM MEDICAL APPOINTMENTS OR FROM GETTING MEDICATIONS?: NO

## 2022-10-18 SDOH — ECONOMIC STABILITY: TRANSPORTATION INSECURITY
IN THE PAST 12 MONTHS, HAS LACK OF TRANSPORTATION KEPT YOU FROM MEETINGS, WORK, OR FROM GETTING THINGS NEEDED FOR DAILY LIVING?: NO

## 2022-10-18 ASSESSMENT — SOCIAL DETERMINANTS OF HEALTH (SDOH)
HOW OFTEN DO YOU GET TOGETHER WITH FRIENDS OR RELATIVES?: MORE THAN THREE TIMES A WEEK
WITHIN THE LAST YEAR, HAVE YOU BEEN KICKED, HIT, SLAPPED, OR OTHERWISE PHYSICALLY HURT BY YOUR PARTNER OR EX-PARTNER?: NO
WITHIN THE LAST YEAR, HAVE TO BEEN RAPED OR FORCED TO HAVE ANY KIND OF SEXUAL ACTIVITY BY YOUR PARTNER OR EX-PARTNER?: NO
IN A TYPICAL WEEK, HOW MANY TIMES DO YOU TALK ON THE PHONE WITH FAMILY, FRIENDS, OR NEIGHBORS?: MORE THAN THREE TIMES A WEEK
WITHIN THE LAST YEAR, HAVE YOU BEEN AFRAID OF YOUR PARTNER OR EX-PARTNER?: NO
DO YOU BELONG TO ANY CLUBS OR ORGANIZATIONS SUCH AS CHURCH GROUPS UNIONS, FRATERNAL OR ATHLETIC GROUPS, OR SCHOOL GROUPS?: YES
HOW OFTEN DO YOU ATTEND CHURCH OR RELIGIOUS SERVICES?: MORE THAN 4 TIMES PER YEAR
WITHIN THE LAST YEAR, HAVE YOU BEEN HUMILIATED OR EMOTIONALLY ABUSED IN OTHER WAYS BY YOUR PARTNER OR EX-PARTNER?: NO
HOW HARD IS IT FOR YOU TO PAY FOR THE VERY BASICS LIKE FOOD, HOUSING, MEDICAL CARE, AND HEATING?: NOT HARD AT ALL
HOW OFTEN DO YOU ATTENT MEETINGS OF THE CLUB OR ORGANIZATION YOU BELONG TO?: MORE THAN 4 TIMES PER YEAR

## 2022-10-18 ASSESSMENT — PATIENT HEALTH QUESTIONNAIRE - PHQ9: CLINICAL INTERPRETATION OF PHQ2 SCORE: 0

## 2022-10-18 ASSESSMENT — LIFESTYLE VARIABLES
AUDIT-C TOTAL SCORE: 2
HOW OFTEN DO YOU HAVE SIX OR MORE DRINKS ON ONE OCCASION: LESS THAN MONTHLY
HOW OFTEN DO YOU HAVE A DRINK CONTAINING ALCOHOL: MONTHLY OR LESS
HOW MANY STANDARD DRINKS CONTAINING ALCOHOL DO YOU HAVE ON A TYPICAL DAY: 1 OR 2
SKIP TO QUESTIONS 9-10: 0

## 2022-10-18 NOTE — PROGRESS NOTES
"INITIAL CARE MANAGEMENT CARE PLAN/ASSESSMENT     Called and spoke to patient to enroll her in the Personal Care Management Program. Patient gave her verbal consent and expressed her desire to enroll in the program. Patient verified her identity by providing this RN with her full name and . Patient is eligible for CCM due to her risk score of 5 and her HTN and CKD3 diagnoses. Patient is a  89 year old Confucianism female. Patient lives alone in a duplex. Patient gets SSI and a State pension from [place]. Patient remains independent with her ADL's and IADL's and does not use any DME. Patient is an active . Patient uses the Internet and [employer]. Patient does not follow a prescribed diet, but states she \"tries to eat healthy\". Would like resources on eating healthy. Patient does not exercise and states she lives \"a sedentary lifestyle\". Patient is interested in exercising but \"lacks motivation\". Would like list of gyms close to her that offer a free membership to [employer] members and exercises she can do from home. Patient denies depression but needs Zolpidem to sleep. Patient states she is \"doing pretty good for an 89 year old\", and her main goal is to read all the books on the list \"100 books that you should read before you die\". Patient states other than trying to exercise more, she really has not other health related goals.   Patient understands the monthly follow-up call requirements of the PCM program. First monthly call is scheduled for 2022 @9:30am. Patient has this RN's name and PCM program phone number and knows she can call with any health questions or concerns. Patient has no other immediate needs/concerns to address at this time.      Medication Self-Management Goals:     Reviewed medications listed below with patient.      Current Outpatient Medications:     [START ON 10/23/2022] zolpidem (AMBIEN) 5 MG Tab, Take 1 Tablet by mouth at bedtime as needed for Sleep for up to 90 days. For up to " 30 days, Disp: 90 Tablet, Rfl: 0    predniSONE (DELTASONE) 1 MG Tab, Take 2 Tablets by mouth every day for 14 days, THEN 1.5 Tablets every day for 14 days, THEN 1 Tablet every day for 14 days, THEN 0.5 Tablets every day for 14 days., Disp: 70 Tablet, Rfl: 0    methocarbamol (ROBAXIN) 750 MG Tab, , Disp: , Rfl:     hydroCHLOROthiazide (HYDRODIURIL) 25 MG Tab, TAKE 1 TABLET BY MOUTH EVERY DAY AS NEEDED, Disp: 90 Tablet, Rfl: 3    albuterol 108 (90 Base) MCG/ACT Aero Soln inhalation aerosol, INHALE 2 PUFFS BY INHALATION ROUTE EVERY 4-6 HRS AS NEEDED, Disp: 8.5 g, Rfl: 2    lovastatin (MEVACOR) 40 MG tablet, TAKE 1 TABLET BY MOUTH EVERY DAY IN THE EVENING, Disp: 30 Tablet, Rfl: 11    gabapentin (NEURONTIN) 100 MG Cap, Take 100 mg by mouth 3 times a day., Disp: , Rfl:     Cholecalciferol (VITAMIN D3) 2000 UNIT Cap, Take 2 Capsules by mouth every day., Disp: , Rfl:     chlorpheniramine (CHLOR-TRIMETRON) 4 MG Tab, Take 4 mg by mouth every day. Second dose in the evening PRN, Disp: , Rfl:     losartan (COZAAR) 50 MG Tab, Take 50 mg by mouth every day., Disp: , Rfl:     omeprazole (PRILOSEC) 40 MG delayed-release capsule, Take 40 mg by mouth 2 (two) times a day.  , Disp: , Rfl:     Dextromethorphan-Guaifenesin (MUCINEX DM PO), Take 1 Tablet by mouth 1 time a day as needed (Mucus).  , Disp: , Rfl:          Goal: Patient will take all of her medications as scheduled/prescribed        Physical/Functional/Environmental Status:     Activities of Daily Living:  Bathing: independent   Dressing: independent  Grooming: independent  Mouth Care: independent  Toileting: independent  Climbing a Flight of Stairs: independent    Independent Activities of Daily Living:  Shopping: independent  Cooking: independent  Managing Medications: independent  Using the phone and looking up numbers: independent  Driving or using public transportation: independent  Managing Finances: independent    STEADI Fall Risk 10/18/2022   One or more falls in the  last year No               Goal: Patient will remain free from falls and injuries from falls      Financial Status:     Patient is financially stable      Goal: N/A     Transportation Status:    Patient is an active      Goal: N/A    Mental/Behavioral/Psychosocial Status:    Depression Screen (PHQ-2/PHQ-9) 12/25/2021 2/18/2022 10/18/2022   PHQ-2 Total Score 0 - -   PHQ-2 Total Score - 0 0       Interpretation of PHQ-9 Total Score   Score Severity   1-4 No Depression   5-9 Mild Depression   10-14 Moderate Depression   15-19 Moderately Severe Depression   20-27 Severe Depression       Goal:  N/A      Chronic Care Management Care Plan         Goal:  Patient will exercise a designated amount of time each day  Barriers: Patient motivation/adherence, knowledge deficit   Interventions: Provide education/motivation to patient to support her exercise goals, provide patient with list of gyms near her that give her a free membership with her SCP insurance. Provide patient with exercises/activities she can do at home.     Start Date: 10/18/2022  End Date:           Discussion: Goals, barriers, and interventions    Goals: Discussed and created     Next Scheduled patient outreach:

## 2022-10-18 NOTE — PROGRESS NOTES
10/18/2022  CHW was referred patient by JOÃO Stroud to assist providing patient resources. CHW mailed out to patient [employer] gym membership benefits, at home exercises(2packets), and heart healthy diet information. CHW to follow up with patient in a week or two regarding materials.       11/1/2022  CHW called patient to follow up on mailed items. Patient received them and has no questions or concerns at this time. Patient stated she would reach out should that change.

## 2022-10-20 ENCOUNTER — HOSPITAL ENCOUNTER (OUTPATIENT)
Dept: RADIOLOGY | Facility: MEDICAL CENTER | Age: 87
End: 2022-10-20
Attending: STUDENT IN AN ORGANIZED HEALTH CARE EDUCATION/TRAINING PROGRAM
Payer: MEDICARE

## 2022-10-20 PROCEDURE — 77080 DXA BONE DENSITY AXIAL: CPT

## 2022-10-21 DIAGNOSIS — F13.20 SEDATIVE HYPNOTIC OR ANXIOLYTIC DEPENDENCE (HCC): ICD-10-CM

## 2022-10-21 DIAGNOSIS — G47.00 INSOMNIA, UNSPECIFIED TYPE: ICD-10-CM

## 2022-10-22 RX ORDER — ZOLPIDEM TARTRATE 5 MG/1
5 TABLET ORAL
Qty: 90 TABLET | Refills: 0 | OUTPATIENT
Start: 2022-10-23 | End: 2023-01-21

## 2022-10-24 DIAGNOSIS — G47.00 INSOMNIA, UNSPECIFIED TYPE: ICD-10-CM

## 2022-10-24 DIAGNOSIS — F13.20 SEDATIVE HYPNOTIC OR ANXIOLYTIC DEPENDENCE (HCC): ICD-10-CM

## 2022-10-24 RX ORDER — ZOLPIDEM TARTRATE 5 MG/1
5 TABLET ORAL
Qty: 90 TABLET | Refills: 0 | Status: SHIPPED | OUTPATIENT
Start: 2022-10-24 | End: 2023-01-16 | Stop reason: SDUPTHER

## 2022-11-01 ENCOUNTER — DOCUMENTATION (OUTPATIENT)
Dept: HEALTH INFORMATION MANAGEMENT | Facility: OTHER | Age: 87
End: 2022-11-01
Payer: MEDICARE

## 2022-11-07 ENCOUNTER — PATIENT MESSAGE (OUTPATIENT)
Dept: HEALTH INFORMATION MANAGEMENT | Facility: OTHER | Age: 87
End: 2022-11-07

## 2022-11-15 ENCOUNTER — PATIENT OUTREACH (OUTPATIENT)
Dept: HEALTH INFORMATION MANAGEMENT | Facility: OTHER | Age: 87
End: 2022-11-15
Payer: MEDICARE

## 2022-11-15 ENCOUNTER — OFFICE VISIT (OUTPATIENT)
Dept: MEDICAL GROUP | Facility: MEDICAL CENTER | Age: 87
End: 2022-11-15
Payer: MEDICARE

## 2022-11-15 VITALS
TEMPERATURE: 98.8 F | DIASTOLIC BLOOD PRESSURE: 72 MMHG | OXYGEN SATURATION: 93 % | WEIGHT: 183 LBS | RESPIRATION RATE: 16 BRPM | HEIGHT: 64 IN | HEART RATE: 105 BPM | SYSTOLIC BLOOD PRESSURE: 118 MMHG | BODY MASS INDEX: 31.24 KG/M2

## 2022-11-15 DIAGNOSIS — R05.1 ACUTE COUGH: ICD-10-CM

## 2022-11-15 DIAGNOSIS — I10 HYPERTENSION, UNSPECIFIED TYPE: ICD-10-CM

## 2022-11-15 DIAGNOSIS — J06.9 ACUTE URI: ICD-10-CM

## 2022-11-15 LAB
EXTERNAL QUALITY CONTROL: NORMAL
INT CON NEG: NEGATIVE
INT CON NEG: NEGATIVE
INT CON POS: POSITIVE
INT CON POS: POSITIVE
S PYO AG THROAT QL: NORMAL
SARS-COV+SARS-COV-2 AG RESP QL IA.RAPID: NEGATIVE

## 2022-11-15 PROCEDURE — 99213 OFFICE O/P EST LOW 20 MIN: CPT | Mod: CS,25 | Performed by: STUDENT IN AN ORGANIZED HEALTH CARE EDUCATION/TRAINING PROGRAM

## 2022-11-15 PROCEDURE — 87426 SARSCOV CORONAVIRUS AG IA: CPT | Performed by: STUDENT IN AN ORGANIZED HEALTH CARE EDUCATION/TRAINING PROGRAM

## 2022-11-15 PROCEDURE — 87880 STREP A ASSAY W/OPTIC: CPT | Performed by: STUDENT IN AN ORGANIZED HEALTH CARE EDUCATION/TRAINING PROGRAM

## 2022-11-15 PROCEDURE — 94640 AIRWAY INHALATION TREATMENT: CPT | Mod: 59 | Performed by: STUDENT IN AN ORGANIZED HEALTH CARE EDUCATION/TRAINING PROGRAM

## 2022-11-15 PROCEDURE — 99490 CHRNC CARE MGMT STAFF 1ST 20: CPT | Performed by: STUDENT IN AN ORGANIZED HEALTH CARE EDUCATION/TRAINING PROGRAM

## 2022-11-15 RX ORDER — IPRATROPIUM BROMIDE AND ALBUTEROL SULFATE 2.5; .5 MG/3ML; MG/3ML
3 SOLUTION RESPIRATORY (INHALATION) ONCE
Status: COMPLETED | OUTPATIENT
Start: 2022-11-15 | End: 2022-11-15

## 2022-11-15 RX ORDER — BENZONATATE 100 MG/1
100 CAPSULE ORAL 3 TIMES DAILY PRN
Qty: 60 CAPSULE | Refills: 0 | Status: SHIPPED | OUTPATIENT
Start: 2022-11-15 | End: 2022-12-21 | Stop reason: SDUPTHER

## 2022-11-15 RX ORDER — AZITHROMYCIN 250 MG/1
TABLET, FILM COATED ORAL
Qty: 6 TABLET | Refills: 0 | Status: SHIPPED | OUTPATIENT
Start: 2022-11-15 | End: 2022-11-21

## 2022-11-15 RX ADMIN — IPRATROPIUM BROMIDE AND ALBUTEROL SULFATE 3 ML: 2.5; .5 SOLUTION RESPIRATORY (INHALATION) at 14:51

## 2022-11-15 ASSESSMENT — FIBROSIS 4 INDEX: FIB4 SCORE: 1.19

## 2022-11-15 NOTE — PROGRESS NOTES
"Returned patient's call from earlier today. Patient states she has a \"bad cough\" and seems to have a cold. Patient is worried about her breathing and that it seems to be getting worse.  RN advised patient to either make appointment to see her PCP or go to an Urgent Care. Patient decided to see her PCP. This RN made her an appointment for today, 11/15/2022 @2:40pm.   "

## 2022-11-15 NOTE — PROGRESS NOTES
"Subjective:     Chief Complaint   Patient presents with    Cough        HPI: Patient is a 89 y.o. female complaining of 5 days of illness including: dry and nonproductive cough, shortness of breath, nasal congestion, rhinorrhea, wheezing.   Mucus is: thin.  Similarly ill exposures: no.  Treatments tried: Patient took Mucinex today continues on antihistamine medication.      She  reports that she quit smoking about 36 years ago. Her smoking use included cigarettes. She has a 30.00 pack-year smoking history. She has never used smokeless tobacco..     ROS:  No fever, cough, nausea, changes in bowel movements or skin rash.       EXAM:  /72 (BP Location: Right arm, Patient Position: Sitting, BP Cuff Size: Adult)   Pulse (!) 105   Temp 37.1 °C (98.8 °F) (Temporal)   Resp 16   Ht 1.613 m (5' 3.5\")   Wt 83 kg (183 lb)   SpO2 93%   General: Alert, no conversational dyspnea or audible wheeze, non-toxic appearance.  Eyes: PERRL, conjunctiva slightly injected, no eye discharge.  Ears: Normal pinnae,TM's not visualized secondary to cerumen bilaterally.  Nares: Patent with thin mucus.  Sinuses: tender over maxillary / frontal sinuses.  Throat: Erythematous injection without exudate.   Neck: Supple, with mildly enlarged cervical nodes.  Lungs: Clear to auscultation bilaterally. + wheeze bilaterally  Heart: Regular rate without murmur.  Skin: Warm and dry without rash.     ASSESSMENT:   1. Acute cough  Acute, stable.  Patient with wheezing bilaterally.  Patient notes cough, fatigue and increased congestion.  Will treat with DuoNeb patient notes relief with DuoNeb in office today.  Patient is on chronic prednisone.  Will treat with azithromycin.  Patient advised to follow-up for any worsening symptoms.  Discussed signs and symptoms that would warrant emergent evaluation.  POCT COVID and strep negative.  - POCT SARS-COV Antigen EDDIE (Symptomatic only)  - POCT Rapid Strep A  - ipratropium-albuterol (DUONEB) nebulizer " solution  - azithromycin (ZITHROMAX Z-SAM) 250 MG Tab; Take 2 tabs (500 mg) on day one, then 1 tab (250 mg) on days 2-5  Dispense: 6 Tablet; Refill: 0    2. Acute URI  - benzonatate (TESSALON) 100 MG Cap; Take 1 Capsule by mouth 3 times a day as needed for Cough.  Dispense: 60 Capsule; Refill: 0  - azithromycin (ZITHROMAX Z-SAM) 250 MG Tab; Take 2 tabs (500 mg) on day one, then 1 tab (250 mg) on days 2-5  Dispense: 6 Tablet; Refill: 0       PLAN:  1. Educated patient that majority of upper respiratory infections are viral and do not need antibiotics. As symptoms have been worsening over the last week, will treat with antibiotics.  2. Twice daily use of nasal saline rinse or Neti-Pot.  3. OTC anti-pyretics and decongestants as needed.  4. Follow-up in office or urgent care for worsening symptoms, difficulty breathing, lack of expected recovery, or should new symptoms or problems arise.

## 2022-11-15 NOTE — PROGRESS NOTES
"Chief Complaint   Patient presents with    Cough        HPI: Patient is a 89 y.o. female complaining of *** days of illness including: {URIsx:52961}.   Mucus is: {Mucous/ sputum desc:14665}.  Similarly ill exposures: {yes no:751218}.  Treatments tried: ***  She  reports that she quit smoking about 36 years ago. Her smoking use included cigarettes. She has a 30.00 pack-year smoking history. She has never used smokeless tobacco..     ROS:  No fever, cough, nausea, changes in bowel movements or skin rash.     EXAM:  /72 (BP Location: Right arm, Patient Position: Sitting, BP Cuff Size: Adult)   Pulse (!) 105   Temp 37.1 °C (98.8 °F) (Temporal)   Resp 16   Ht 1.613 m (5' 3.5\")   Wt 83 kg (183 lb)   SpO2 93%   General: Alert, no conversational dyspnea or audible wheeze, non-toxic appearance.  Eyes: PERRL, conjunctiva slightly injected, no eye discharge.  Ears: Normal pinnae,TM's { TMS:04423} bilaterally.  Nares: Patent with {Mucous/ sputum desc:07602} mucus.  Sinuses: {TENDER/NONTENDER:99007} over maxillary / frontal sinuses.  Throat: Erythematous injection without exudate.   Neck: Supple, with {LYMPH NODES 0-18 YEARS:79975}.  Lungs: Clear to auscultation bilaterally, no wheeze, crackles or rhonchi.   Heart: Regular rate without murmur.  Skin: Warm and dry without rash.     ASSESSMENT: No diagnosis found. ***     PLAN:  1. Educated patient that majority of upper respiratory infections are viral and do not need antibiotics. ***As symptoms have been worsening over the last week, will treat with antibiotics.  2. Twice daily use of nasal saline rinse or Neti-Pot.  3. OTC anti-pyretics and decongestants as needed.  4. Follow-up in office or urgent care for worsening symptoms, difficulty breathing, lack of expected recovery, or should new symptoms or problems arise.    "

## 2022-11-18 ENCOUNTER — PATIENT OUTREACH (OUTPATIENT)
Dept: HEALTH INFORMATION MANAGEMENT | Facility: OTHER | Age: 87
End: 2022-11-18
Payer: MEDICARE

## 2022-11-18 DIAGNOSIS — I10 HYPERTENSION, UNSPECIFIED TYPE: ICD-10-CM

## 2022-11-21 ENCOUNTER — PATIENT OUTREACH (OUTPATIENT)
Dept: HEALTH INFORMATION MANAGEMENT | Facility: OTHER | Age: 87
End: 2022-11-21
Payer: MEDICARE

## 2022-11-21 DIAGNOSIS — N18.31 CHRONIC KIDNEY DISEASE, STAGE 3A: ICD-10-CM

## 2022-11-21 NOTE — PROGRESS NOTES
"Called and spoke to patient to complete her PCM program monthly follow-up call. Patient is still recovering from a chest cold. Patient saw her PCP and was started on a Z-pack and some cough medicine. Patient states she is getting better but is still \"very fatigued\". Patient states she just wishes she could get rid of her cough. Patient states that other than this cold, she has been \"doing well and feeling good\", and her goals are in progress. Patient has not other immediate needs or concerns to address at this time, just knows she needs to rest and \"drink lots of fluids\".   "

## 2022-12-08 ENCOUNTER — OFFICE VISIT (OUTPATIENT)
Dept: MEDICAL GROUP | Facility: MEDICAL CENTER | Age: 87
End: 2022-12-08
Payer: MEDICARE

## 2022-12-08 VITALS
SYSTOLIC BLOOD PRESSURE: 110 MMHG | RESPIRATION RATE: 16 BRPM | WEIGHT: 181 LBS | HEIGHT: 64 IN | DIASTOLIC BLOOD PRESSURE: 72 MMHG | HEART RATE: 85 BPM | OXYGEN SATURATION: 94 % | TEMPERATURE: 98.2 F | BODY MASS INDEX: 30.9 KG/M2

## 2022-12-08 DIAGNOSIS — R06.02 SOB (SHORTNESS OF BREATH): ICD-10-CM

## 2022-12-08 DIAGNOSIS — R05.1 ACUTE COUGH: ICD-10-CM

## 2022-12-08 PROCEDURE — 94640 AIRWAY INHALATION TREATMENT: CPT | Performed by: STUDENT IN AN ORGANIZED HEALTH CARE EDUCATION/TRAINING PROGRAM

## 2022-12-08 PROCEDURE — 99213 OFFICE O/P EST LOW 20 MIN: CPT | Mod: 25 | Performed by: STUDENT IN AN ORGANIZED HEALTH CARE EDUCATION/TRAINING PROGRAM

## 2022-12-08 RX ORDER — IPRATROPIUM BROMIDE AND ALBUTEROL SULFATE 2.5; .5 MG/3ML; MG/3ML
3 SOLUTION RESPIRATORY (INHALATION) ONCE
Status: COMPLETED | OUTPATIENT
Start: 2022-12-08 | End: 2022-12-08

## 2022-12-08 RX ADMIN — IPRATROPIUM BROMIDE AND ALBUTEROL SULFATE 3 ML: 2.5; .5 SOLUTION RESPIRATORY (INHALATION) at 10:48

## 2022-12-08 ASSESSMENT — FIBROSIS 4 INDEX: FIB4 SCORE: 1.19

## 2022-12-08 NOTE — PROGRESS NOTES
"Subjective:     Chief Complaint   Patient presents with    Follow-Up     2 weeks          HPI:   Iveth presents today with    Cough  Patient seen 2 weeks ago for concerns about upper respiratory illness x5 days and cough.  Patient presents today 2 weeks later stating that she still has a cough.  With history of tobacco use and asthma.  Continues on chlorpheniramine tablets antihistamine, Mucinex daily and albuterol regularly.  Tessalon helps to relieve cough.  Patient notes that she does not have cough while sleeping or in the mornings but when she starts talking she gets the cough.  Patient has not been resting.  Patient is currently tapering off prednisone and only has 6 days left 1 mg dose.    ROS:  Gen: no fevers/chills  Pulm: + sob, +cough  CV: no chest pain, no palpitations  GI: no nausea/vomiting, no diarrhea      Objective:     Exam:  /72 (BP Location: Right arm, Patient Position: Sitting, BP Cuff Size: Adult)   Pulse 85   Temp 36.8 °C (98.2 °F) (Temporal)   Resp 16   Ht 1.613 m (5' 3.5\")   Wt 82.1 kg (181 lb)   SpO2 94%   BMI 31.56 kg/m²  Body mass index is 31.56 kg/m².    Gen: Alert and oriented, No apparent distress.  Neck: Neck is supple without lymphadenopathy.  Lungs: Normal effort, CTA bilaterally, no wheezes, rhonchi, or rales  CV: Regular rate and rhythm. No murmurs, rubs, or gallops.  Ext: No clubbing, cyanosis, edema.      Assessment & Plan:     89 y.o. female with the following -     1. Acute cough  Acute, stable.  Discussed chronic nature of cough.  With previous treatment.  Patient encouraged to continue treating sinus congestion.  Patient continues to have wheezing bilaterally.  We will treat with inhaled steroid.  Discussed oral steroid but due to current prednisone taper not recommended.  We will get chest x-ray to further evaluate.  Continue to follow.  - DX-CHEST-2 VIEWS; Future  - ipratropium-albuterol (DUONEB) nebulizer solution  - beclomethasone HFA (QVAR REDIHALER) 80 " MCG/ACT inhaler; Inhale 1 Puff 2 times a day.  Dispense: 10.6 g; Refill: 0    2. SOB (shortness of breath)  - DX-CHEST-2 VIEWS; Future  - beclomethasone HFA (QVAR REDIHALER) 80 MCG/ACT inhaler; Inhale 1 Puff 2 times a day.  Dispense: 10.6 g; Refill: 0     No follow-ups on file.    Please note that this dictation was created using voice recognition software. I have made every reasonable attempt to correct obvious errors, but I expect that there are errors of grammar and possibly content that I did not discover before finalizing the note.

## 2022-12-09 ENCOUNTER — APPOINTMENT (OUTPATIENT)
Dept: RADIOLOGY | Facility: MEDICAL CENTER | Age: 87
End: 2022-12-09
Attending: STUDENT IN AN ORGANIZED HEALTH CARE EDUCATION/TRAINING PROGRAM
Payer: MEDICARE

## 2022-12-09 DIAGNOSIS — R05.1 ACUTE COUGH: ICD-10-CM

## 2022-12-09 DIAGNOSIS — R06.02 SOB (SHORTNESS OF BREATH): ICD-10-CM

## 2022-12-09 PROCEDURE — 71046 X-RAY EXAM CHEST 2 VIEWS: CPT

## 2022-12-21 ENCOUNTER — PATIENT OUTREACH (OUTPATIENT)
Dept: HEALTH INFORMATION MANAGEMENT | Facility: OTHER | Age: 87
End: 2022-12-21
Payer: MEDICARE

## 2022-12-21 ENCOUNTER — PATIENT OUTREACH (OUTPATIENT)
Dept: MEDICAL GROUP | Facility: MEDICAL CENTER | Age: 87
End: 2022-12-21
Payer: MEDICARE

## 2022-12-21 ENCOUNTER — PATIENT OUTREACH (OUTPATIENT)
Dept: HEALTH INFORMATION MANAGEMENT | Facility: OTHER | Age: 87
End: 2022-12-21

## 2022-12-21 DIAGNOSIS — N18.31 CHRONIC KIDNEY DISEASE, STAGE 3A: ICD-10-CM

## 2022-12-21 DIAGNOSIS — I10 HYPERTENSION, UNSPECIFIED TYPE: ICD-10-CM

## 2022-12-21 DIAGNOSIS — J06.9 ACUTE URI: ICD-10-CM

## 2022-12-21 PROCEDURE — 99490 CHRNC CARE MGMT STAFF 1ST 20: CPT | Performed by: STUDENT IN AN ORGANIZED HEALTH CARE EDUCATION/TRAINING PROGRAM

## 2022-12-21 NOTE — TELEPHONE ENCOUNTER
Pt called asking for a refill of the Tessalon pearls. Her cough continues to be very bad. She would like also like a prescription of Zirtussin A/c.   Pt did not  the beclomethasone inhaler as it was too expensive for

## 2022-12-21 NOTE — PROGRESS NOTES
"Called and spoke to patient to complete PCM monthly follow-up call. Patient states she continues to be sick with an upper respiratory virus and has a chronic cough. Patient has seen her PCP 2x for this problem. Patient reports she is \"very tired' and fatigued from coughing so much. Patient is out of her Tessalon and requests a refill. Patient reports she did not receive the Beclomethasone inhaler that her PCP prescribed due to it's cost. Patient states she has a history of chronic bronchitis and states that Zirtussin AC cough syrup has worked for her chronic cough in the past. This RN will have MA send message to PCP requesting Tessalon refill and an order for the cough syrup. Patient has been sick for over a month so she has not been exercising. Patient is otherwise doing well. Patient has no new goals at this time. Patient has PCM program phone number and knows she can call with any questions/needs.     Next PCM monthly/quarterly F/U call scheduled for 1/20/2023 @ 10:00am  "

## 2022-12-22 DIAGNOSIS — R05.1 ACUTE COUGH: ICD-10-CM

## 2022-12-22 DIAGNOSIS — R06.02 SOB (SHORTNESS OF BREATH): ICD-10-CM

## 2022-12-22 RX ORDER — BENZONATATE 100 MG/1
100 CAPSULE ORAL 3 TIMES DAILY PRN
Qty: 60 CAPSULE | Refills: 0 | Status: SHIPPED | OUTPATIENT
Start: 2022-12-22 | End: 2023-02-03

## 2023-01-03 ENCOUNTER — OFFICE VISIT (OUTPATIENT)
Dept: MEDICAL GROUP | Facility: MEDICAL CENTER | Age: 88
End: 2023-01-03
Payer: MEDICARE

## 2023-01-03 VITALS
OXYGEN SATURATION: 92 % | DIASTOLIC BLOOD PRESSURE: 70 MMHG | HEART RATE: 88 BPM | WEIGHT: 182.4 LBS | SYSTOLIC BLOOD PRESSURE: 116 MMHG | TEMPERATURE: 96.8 F | BODY MASS INDEX: 31.14 KG/M2 | HEIGHT: 64 IN

## 2023-01-03 DIAGNOSIS — G31.9 CEREBRAL ATROPHY (HCC): ICD-10-CM

## 2023-01-03 DIAGNOSIS — M81.8 OTHER OSTEOPOROSIS WITHOUT CURRENT PATHOLOGICAL FRACTURE: ICD-10-CM

## 2023-01-03 DIAGNOSIS — N18.31 CHRONIC KIDNEY DISEASE, STAGE 3A: ICD-10-CM

## 2023-01-03 DIAGNOSIS — I70.0 ATHEROSCLEROSIS OF AORTA (HCC): ICD-10-CM

## 2023-01-03 DIAGNOSIS — F13.20 SEDATIVE HYPNOTIC OR ANXIOLYTIC DEPENDENCE (HCC): ICD-10-CM

## 2023-01-03 DIAGNOSIS — E66.9 OBESITY (BMI 30.0-34.9): ICD-10-CM

## 2023-01-03 DIAGNOSIS — R05.2 SUBACUTE COUGH: ICD-10-CM

## 2023-01-03 DIAGNOSIS — M35.3 POLYMYALGIA RHEUMATICA (HCC): ICD-10-CM

## 2023-01-03 PROCEDURE — 99214 OFFICE O/P EST MOD 30 MIN: CPT | Performed by: STUDENT IN AN ORGANIZED HEALTH CARE EDUCATION/TRAINING PROGRAM

## 2023-01-03 RX ORDER — FLUTICASONE PROPIONATE 110 UG/1
1 AEROSOL, METERED RESPIRATORY (INHALATION) 2 TIMES DAILY
Qty: 12 G | Refills: 0 | Status: SHIPPED | OUTPATIENT
Start: 2023-01-03 | End: 2023-02-02

## 2023-01-03 ASSESSMENT — ENCOUNTER SYMPTOMS
FEVER: 0
CHILLS: 0
WHEEZING: 1
PALPITATIONS: 0
SPUTUM PRODUCTION: 1
SHORTNESS OF BREATH: 1
COUGH: 1
WEIGHT LOSS: 0

## 2023-01-03 ASSESSMENT — FIBROSIS 4 INDEX: FIB4 SCORE: 1.19

## 2023-01-03 ASSESSMENT — PATIENT HEALTH QUESTIONNAIRE - PHQ9
CLINICAL INTERPRETATION OF PHQ2 SCORE: 1
5. POOR APPETITE OR OVEREATING: 2 - MORE THAN HALF THE DAYS
SUM OF ALL RESPONSES TO PHQ QUESTIONS 1-9: 5
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
## 2023-01-05 RX ORDER — ALBUTEROL SULFATE 90 UG/1
AEROSOL, METERED RESPIRATORY (INHALATION)
Qty: 8.5 EACH | Refills: 0 | Status: SHIPPED | OUTPATIENT
Start: 2023-01-05 | End: 2023-06-14 | Stop reason: SDUPTHER

## 2023-01-05 NOTE — TELEPHONE ENCOUNTER
Received request via: Pharmacy    Was the patient seen in the last year in this department? Yes    Does the patient have an active prescription (recently filled or refills available) for medication(s) requested? No    Does the patient [facility] and need 100 day supply (blood pressure, diabetes and cholesterol meds only)? Yes, quantity updated to 100 days

## 2023-01-15 ASSESSMENT — RHEUMATOLOGY FOLLOW-UP QUESTIONNAIRE
BURNING URINATION: N
TINGLING: Y
GENITAL ULCERS: N
DRY COUGH: N
RATE_1TO10: 7
BLOOD IN URINE: N
CAVITIES: N
IRREGULAR BEATS: N
EYE REDNESS: N
VERTIGO: N
SKIN THICKENING: N
RINGING IN EARS: N
VISION LOSS: N
HEARTBURN: N
JOINT SWELLING: N
CHEST PAIN WITH BREATHING: N
PALPITATIONS: N
SINONASAL CONGESTION: N
ABNORMAL DISCHARGE: N
HEARING LOSS: N
TEMPLE PAIN: N
FEVERS: N
PRECEDING INCIDENT OR AILMENT: PMR
DEPRESSION: N
PELVIC PAIN: N
EAR PAIN: N
NECK PAIN: N
DIFFICULTY SWALLOWING: N
HAIR LOSS WITH BALD SPOTS: N
MARK ALL THE AREAS OF PAIN: 81308826
SORE THROAT: N
BLOODY DIARRHEA: N
NUMBNESS: N
DIZZINESS: N
ABDOMINAL PAIN: N
JOINT PAIN: SAME WITH ACTIVITY
GOITER: N
ANXIETY: N
HAIR SHEDDING: N
DRY EYES: Y
COUGH WITH BLOODY PHLEGM: N
ACHILLES TENDON PAIN: N
SKIN PLAQUES: N
ORAL ULCERS: N
NASAL ULCERS: N
FROTHY URINE: N
MUSCLE PAIN: Y
NAUSEA: N
BLURRINESS: N
BODY ACHES: Y
MORNING STIFFNESS: IMPROVES WITH ACTIVITY
NIGHT SWEATS: N
THYROID PAIN: N
NOSEBLEEDS: N
DRY MOUTH: N
SPASMS: N
VOMITING: N
SNORING: N
BLEEDING GUMS: N
EYE PAIN: N
SINUS PAIN: N
CHILLS: N
DIFFICULTY SPEAKING: N
BLOODY CONSTIPATION: N
SHORTNESS OF BREATH: N
HEADACHES: N

## 2023-01-16 DIAGNOSIS — F13.20 SEDATIVE HYPNOTIC OR ANXIOLYTIC DEPENDENCE (HCC): ICD-10-CM

## 2023-01-16 DIAGNOSIS — G47.00 INSOMNIA, UNSPECIFIED TYPE: ICD-10-CM

## 2023-01-16 NOTE — TELEPHONE ENCOUNTER
Received request via: Patient    Was the patient seen in the last year in this department? Yes    Does the patient have an active prescription (recently filled or refills available) for medication(s) requested? No    Does the patient have [facility] and need 100 day supply (blood pressure, diabetes and cholesterol meds only)? Medication is not for cholesterol, blood pressure or diabetes  
No

## 2023-01-17 ENCOUNTER — OFFICE VISIT (OUTPATIENT)
Dept: RHEUMATOLOGY | Facility: MEDICAL CENTER | Age: 88
End: 2023-01-17
Attending: STUDENT IN AN ORGANIZED HEALTH CARE EDUCATION/TRAINING PROGRAM
Payer: MEDICARE

## 2023-01-17 VITALS
DIASTOLIC BLOOD PRESSURE: 82 MMHG | WEIGHT: 182 LBS | SYSTOLIC BLOOD PRESSURE: 128 MMHG | RESPIRATION RATE: 16 BRPM | HEART RATE: 88 BPM | BODY MASS INDEX: 31.07 KG/M2 | OXYGEN SATURATION: 96 % | TEMPERATURE: 97.9 F | HEIGHT: 64 IN

## 2023-01-17 DIAGNOSIS — M35.3 POLYMYALGIA RHEUMATICA (HCC): ICD-10-CM

## 2023-01-17 DIAGNOSIS — M75.31 CALCIFIC TENDINITIS OF BOTH SHOULDERS: ICD-10-CM

## 2023-01-17 DIAGNOSIS — M81.0 OSTEOPOROSIS OF FOREARM: ICD-10-CM

## 2023-01-17 DIAGNOSIS — M19.042 PRIMARY OSTEOARTHRITIS OF BOTH HANDS: ICD-10-CM

## 2023-01-17 DIAGNOSIS — M19.041 PRIMARY OSTEOARTHRITIS OF BOTH HANDS: ICD-10-CM

## 2023-01-17 DIAGNOSIS — M75.32 CALCIFIC TENDINITIS OF BOTH SHOULDERS: ICD-10-CM

## 2023-01-17 PROCEDURE — 99214 OFFICE O/P EST MOD 30 MIN: CPT | Performed by: STUDENT IN AN ORGANIZED HEALTH CARE EDUCATION/TRAINING PROGRAM

## 2023-01-17 PROCEDURE — 99212 OFFICE O/P EST SF 10 MIN: CPT | Performed by: STUDENT IN AN ORGANIZED HEALTH CARE EDUCATION/TRAINING PROGRAM

## 2023-01-17 ASSESSMENT — FIBROSIS 4 INDEX: FIB4 SCORE: 1.19

## 2023-01-17 NOTE — PROGRESS NOTES
[facility] RHEUMATOLOGY  [address], [address], [place] [address]  Phone: [phone] ? Fax: [phone]    RHEUMATOLOGY FOLLOW-UP VISIT NOTE      DATE OF SERVICE: 01/17/2023         Subjective     PRIMARY CARE PRACTITIONER:  Belkis Vanegas P.A.-C.  [address]  [place] [address]    PATIENT IDENTIFICATION:  Iveth Matrin  [address] [facility] [address] [place] [address]    YOB: 1933    MEDICAL RECORD NUMBER: [id number]          CHIEF COMPLAINT:   Chief Complaint   Patient presents with    Follow-Up     PMR       RHEUMATOLOGIC HISTORY:  Iveth Martin is a 89 y.o. female with pertinent history notable for polymyalgia rheumatica diagnosed in 10/2021 (reportedly symptomatic since 6/2021 managed by PCP until rheumatology evaluation), calcific tendinopathy of bilateral rotator cuffs, osteoarthritis of hands, left third/fourth trigger fingers s/p surgical releases, carpal tunnel syndrome s/p bilateral surgical releases, DJD/DDD of cervical/thoracic/lumbar spines, osteoporosis in distal left forearm (based on DEXA scan in 10/2022), and CKD among other comorbidities. Previously under the care of a local rheumatologist who has retired (Dr. Scott Bryant), she initially presented on 10/6/22 to establish care for continued evaluation and management of her condition. Reported waxing/waning joint/muscle pain in her hands (particularly her right fifth DIP joint which was very tender/swollen for a couple of months prior), shoulders, and lower back. These were associated with over 1 hour of morning stiffness that improved with activity, but her joint pain tended to worsen with activity especially in her hands as she is a long-term .     Pertinent treatment history as of 10/2022: Prednisone taper 15 >> 2.5 > 1 mg daily (6/2022-12/2022, helpful), gabapentin 100 mg TID (unclear benefit).    Pertinent lab results as of 10/2022: Positive TONY with borderline anti-dsDNA 10 (in 10/2021); negative/normal RF  and anti-CCP (in 10/2021), HLA-B27, ESR, CRP, TSH, LFT, and unremarkable CBC.    Pertinent XR imaging results as of 8/2021 (CXR): Shoulders with bilateral rotator cuff calcifications consistent with calcific tendinopathy.    INTERVAL HISTORY:  [facility] Rheumatology Established Patient History Form    1/15/2023 11:31 AM PST - Filed by Patient   Chief Complaint    Interval History of Present Condition   Date of worsening symptom onset: 1/1/2023   Preceding incident/ailment: PMR   Describe/list your symptoms: Muscle soreness in thighs particularly close to torso;  muscle soreness in upper arms and rotator cuffs;  muscle soreness and shoulder muscles and neck.   Aggravating factors: Unknown   Alleviating factors: Unknown   Helpful medications: Prednisone (stopped 1st week or December   Ineffective medications:    Symptom severity/impact (scale of 1-10): 7   Personal/emotional stressors: I am a pianist, planning 3 performances already scheduled;  difficulty in dressing and undressing myself;  difficulty in everyday tasks that include reaching.   [place] The Locations Of Pain    REVIEW OF SYSTEMS    General   Fevers No   Chills No   Night sweats No   Unintentional Weight Loss None   Musculoskeletal   Joint pain Same with activity   Morning stiffness All day; Improves with activity   Joint swelling No   Achilles tendon pain No   Muscle pain Yes   Body Aches Yes   Dermatologic   Hair loss with bald spots No   Hair shedding No   Sunlight-induced skin rash    Skin thickening No   Skin plaques No   Cold-induced color changes (white, purple, red on rewarming)    Neurologic/Psychiatric   Muscle weakness    Spasms No   Tingling Yes   Numbness No   Anxiety No   Depression No   Head/Eyes   Headaches No   Temple pain No   Dizziness No   Dry eyes Yes   Eye pain No   Eye redness No   Blurriness No   Vision loss No   Ears/Nose   Ear pain No   Ringing in ears No   Vertigo No   Hearing loss No   Nasal ulcers No   Nosebleeds No   Sinus pain  No   Sinonasal congestion No   Snoring No   Mouth/Throat   Oral ulcers No   Bleeding gums No   Dry mouth No   Cavities No   Sore throat No   Difficulty speaking No   Difficulty swallowing No   Neck/Lymphatics   Neck pain No   Thyroid pain No   Goiter No   Swollen Glands    Cardiac/Respiratory   Chest pain with breathing No   Dry cough No   Cough with bloody phlegm No   Shortness of breath No   Palpitations No   Irregular beats No   Gastrointestinal   Nausea No   Vomiting No   Heartburn No   Abdominal pain No   Bloody diarrhea No   Bloody constipation No   Genitourinary   Pelvic Pain No   Genital ulcers No   Abnormal discharge No   Burning urination No   Frothy urine No   Blood in urine No   Supplemental Information   Notable Life Changes/Adjustments Since Last Visit No changes or adjustments except to do the best I can with the increasing discomfort.  Completion of Prednisone medication around the 1st week of December.  Onset of infection in the bronchial area (bronchitis) in December.  Flovent Rx 2 weeks ago worked.       ACTIVE PROBLEM LIST:  Patient Active Problem List    Diagnosis Date Noted    Osteoporosis of left forearm 01/17/2023    Subacute cough 01/03/2023    Other osteoporosis without current pathological fracture 01/03/2023    Long term current use of systemic steroids 10/06/2022    Calcific tendinitis of both shoulders 10/06/2022    Primary osteoarthritis of both hands 10/06/2022    Leukocytosis 12/20/2021    Vitamin D deficiency 12/20/2021    Thrombocytosis 12/20/2021    Prediabetes 12/20/2021    NAFLD (nonalcoholic fatty liver disease) 12/20/2021    Low iron 12/20/2021    Hypertensive renal disease 12/20/2021    Hypercholesterolemia 12/20/2021    Elevated antibody levels 12/20/2021    CRP elevated 12/20/2021    Chronic kidney disease, stage 3a (HCC) 12/20/2021    Arthralgia 12/20/2021    Atherosclerosis of aorta (HCC) 12/08/2021    Cerebral atrophy (HCC) 12/08/2021    Obesity (BMI 30.0-34.9)  12/08/2021    Gastroesophageal reflux disease 12/08/2021    Hypertension 12/08/2021    Polymyalgia rheumatica (HCC) 12/08/2021    Chronic bilateral back pain 12/08/2021    Insomnia 12/08/2021    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    Abnormal results of kidney function studies 12/08/2021       PAST MEDICAL HISTORY:  Past Medical History:   Diagnosis Date    Hyperlipidemia     Hypertension        PAST SURGICAL HISTORY:  Past Surgical History:   Procedure Laterality Date    PB WRIST ARTHROSCOP,RELEASE XVERS LIG Left 8/11/2021    Procedure: LEFT ENDOSCOPIC CARPAL TUNNEL RELEASE;  Surgeon: Pee Khan M.D.;  Location: [facility];  Service: Orthopedics    PB INCISE FINGER TENDON SHEATH Left 8/11/2021    Procedure: LEFT LONG AND RING TRIGGER FINGER RELEASE;  Surgeon: Pee Khan M.D.;  Location: [facility];  Service: Orthopedics       MEDICATIONS:  Current Outpatient Medications   Medication Sig    lovastatin (MEVACOR) 40 MG tablet TAKE 1 TABLET BY MOUTH EVERY DAY IN THE EVENING    albuterol 108 (90 Base) MCG/ACT Aero Soln inhalation aerosol INHALE 2 PUFFS BY INHALATION ROUTE EVERY 4-6 HRS AS NEEDED    fluticasone (FLOVENT HFA) 110 MCG/ACT Aerosol Inhale 1 Puff 2 times a day.    benzonatate (TESSALON) 100 MG Cap Take 1 Capsule by mouth 3 times a day as needed for Cough.    zolpidem (AMBIEN) 5 MG Tab Take 1 Tablet by mouth at bedtime as needed for Sleep for up to 90 days.    methocarbamol (ROBAXIN) 750 MG Tab     hydroCHLOROthiazide (HYDRODIURIL) 25 MG Tab TAKE 1 TABLET BY MOUTH EVERY DAY AS NEEDED    gabapentin (NEURONTIN) 100 MG Cap Take 1 Capsule by mouth 3 times a day.    Cholecalciferol (VITAMIN D3) 2000 UNIT Cap Take 2 Capsules by mouth every day.    chlorpheniramine (CHLOR-TRIMETRON) 4 MG Tab Take 1 Tablet by mouth every day. Second dose in the evening PRN    losartan (COZAAR) 50 MG Tab Take 1 Tablet by mouth every day.    omeprazole  "(PRILOSEC) 40 MG delayed-release capsule Take 1 Capsule by mouth 2 (two) times a day.      Dextromethorphan-Guaifenesin (MUCINEX DM PO) Take 1 Tablet by mouth 1 time a day as needed (Mucus).         ALLERGIES:   Allergies   Allergen Reactions    Celebrex [Celecoxib] Unspecified     GERD reaction    Lisinopril Cough    Nsaids Unspecified     GERD reaction    Other Drug Unspecified     bioxin    Clarithromycin Vomiting    Benadryl Allergy Unspecified     \"tongue gets fuzzy and feel like I am drugged\"       IMMUNIZATIONS:  Immunization History   Administered Date(s) Administered    Influenza Seasonal Injectable - Historical Data 10/15/2013    Influenza Vaccine Adult HD 2014, 2020, 10/05/2022    Influenza Vaccine Pediatric Split - Historical Data 2010    Influenza, Unspecified - HISTORICAL DATA 10/15/2019, 10/21/2021    MODERNA SARS-COV-2 VACCINE (12+) 2021, 2021, 2021    PFIZER BIVALENT BOOSTER SARS-COV-2 VACCINE (12+) 10/05/2022    PFIZER ASTUDILLO CAP SARS-COV-2 VACCINATION (12+) 2022    Pneumococcal Conjugate Vaccine (Prevnar/PCV-13) 2014    Pneumococcal polysaccharide vaccine (PPSV-23) 2018    Tdap Vaccine 2013       SOCIAL HISTORY:   Social History     Socioeconomic History    Marital status:    Tobacco Use    Smoking status: Former     Packs/day: 1.00     Years: 30.00     Pack years: 30.00     Types: Cigarettes     Quit date:      Years since quittin.0    Smokeless tobacco: Never   Vaping Use    Vaping Use: Never used   Substance and Sexual Activity    Alcohol use: Yes     Comment: rare    Drug use: No     Social Determinants of Health     Financial Resource Strain: Low Risk     Difficulty of Paying Living Expenses: Not hard at all   Food Insecurity: No Food Insecurity    Worried About Running Out of Food in the Last Year: Never true    Ran Out of Food in the Last Year: Never true   Transportation Needs: No Transportation Needs    Lack of " "Transportation (Medical): No    Lack of Transportation (Non-Medical): No   Physical Activity: Inactive    Days of Exercise per Week: 0 days    Minutes of Exercise per Session: 0 min   Stress: No Stress Concern Present    Feeling of Stress : Not at all   Social Connections: Moderately Integrated    Frequency of Communication with Friends and Family: More than three times a week    Frequency of Social Gatherings with Friends and Family: More than three times a week    Attends [employer] Services: More than 4 times per year    Active Member of Clubs or Organizations: Yes    Attends Club or Organization Meetings: More than 4 times per year    Marital Status:    Intimate Partner Violence: Not At Risk    Fear of Current or Ex-Partner: No    Emotionally Abused: No    Physically Abused: No    Sexually Abused: No   Housing Stability: Low Risk     Unable to Pay for Housing in the Last Year: No    Number of Places Lived in the Last Year: 1    Unstable Housing in the Last Year: No       FAMILY HISTORY:  Family History   Problem Relation Age of Onset    Cancer Father     Heart Disease Father             Objective     Vital Signs: /82 (BP Location: Right arm, Patient Position: Sitting, BP Cuff Size: Large adult)   Pulse 88   Temp 36.6 °C (97.9 °F) (Temporal)   Resp 16   Ht 1.613 m (5' 3.5\")   Wt 82.6 kg (182 lb)   SpO2 96% Body mass index is 31.73 kg/m².    General: Appears well and comfortable  Eyes: No scleral or conjunctival lesions  ENT: No apparent oral or nasal lesions  Head/Neck: No apparent scalp or neck lesions  Cardiovascular: Regular rate and rhythm  Respiratory: Breathing quiet and unlabored  Gastrointestinal: No organomegaly or abdominal masses  Integumentary: No significant cutaneous lesions or discolorations  Musculoskeletal: Poorly localized mild-yo-moderate tenderness of upper arms, neck/trapezius, thighs, and hands; range of motion of shoulders somewhat limited by pain; no periarticular soft " tissue swelling, warmth, erythema, or overt signs of synovitis  Neurologic: No focal sensory or motor deficits  Psychiatric: Mood and affect appropriate    Shoulders limited by pain    LABORATORY RESULTS REVIEWED AND INTERPRETED BY ME:  Lab Results   Component Value Date/Time    SEDRATEWES 7 10/13/2022 01:10 PM    CREACTPROT <0.30 10/13/2022 01:10 PM     Lab Results   Component Value Date/Time    [id number] Negative 10/13/2022 01:10 PM     Lab Results   Component Value Date/Time    TSHULTRASEN 2.070 09/22/2022 07:49 AM     Lab Results   Component Value Date/Time    ASTSGOT 17 09/22/2022 07:49 AM    ALTSGPT 13 09/22/2022 07:49 AM    ALKPHOSPHAT 79 09/22/2022 07:49 AM    TBILIRUBIN 0.8 09/22/2022 07:49 AM    TOTPROTEIN 6.2 09/22/2022 07:49 AM    ALBUMIN 4.1 09/22/2022 07:49 AM     Lab Results   Component Value Date/Time    SODIUM 138 09/22/2022 07:49 AM    POTASSIUM 3.6 09/22/2022 07:49 AM    CHLORIDE 99 09/22/2022 07:49 AM    CO2 30 09/22/2022 07:49 AM    GLUCOSE 105 (H) 09/22/2022 07:49 AM    BUN 16 09/22/2022 07:49 AM    CREATININE 0.95 09/22/2022 07:49 AM    CREATININE 0.9 12/12/2007 12:30 PM    BUNCREATRAT 20 10/01/2021 08:17 AM    CALCIUM 9.4 09/22/2022 07:49 AM     Lab Results   Component Value Date/Time    WBC 9.1 09/22/2022 07:49 AM    RBC 5.18 09/22/2022 07:49 AM    HEMOGLOBIN 15.3 09/22/2022 07:49 AM    HEMATOCRIT 46.0 09/22/2022 07:49 AM    MCV 88.8 09/22/2022 07:49 AM    MCH 29.5 09/22/2022 07:49 AM    MCHC 33.3 (L) 09/22/2022 07:49 AM    RDW 47.1 09/22/2022 07:49 AM    PLATELETCT 353 09/22/2022 07:49 AM    MPV 10.3 09/22/2022 07:49 AM    NEUTS 12.72 (H) 12/24/2021 12:10 PM    LYMPHOCYTES 9.60 (L) 12/24/2021 12:10 PM    MONOCYTES 5.20 12/24/2021 12:10 PM    EOSINOPHILS 0.90 12/24/2021 12:10 PM    BASOPHILS 0.00 12/24/2021 12:10 PM    ANISOCYTOSIS 1+ 12/24/2021 12:10 PM     Lab Results   Component Value Date/Time    CHOLSTRLTOT 202 (H) 09/22/2022 07:49 AM     (H) 09/22/2022 07:49 AM    HDL 64  09/22/2022 07:49 AM    TRIGLYCERIDE 187 (H) 09/22/2022 07:49 AM    HBA1C 6.7 (H) 09/22/2022 07:49 AM       RADIOLOGY RESULTS REVIEWED AND INTERPRETED BY ME:  Results for orders placed in visit on 07/13/21    DX-HAND 3+ LEFT    Results for orders placed in visit on 10/06/22    DS-BONE DENSITY STUDY (DEXA)    Impression  According to the World Health Organization classification, bone mineral density of this patient is osteoporotic in the distal left forearm and normal in the proximal left femur.    10-year Probability of Fracture:  Major Osteoporotic     16.8%  Hip     5.3%  Population      [place] ()    Based on left femur neck BMD    Results for orders placed during the hospital encounter of 03/12/13    CT-LSPINE W/O PLUS RECONS    Impression  1. No acute fracture or dislocation lumbar spine.  Multilevel degenerative changes.  2. Grade 1 L4 anterolisthesis.  No definite pars defect.  3. Densities within the lumbar spine and visualized pelvis nonspecific and could be multiple bone islands.  Osteopoikilosis a consideration.  Metastatic disease cannot be excluded.  4. Moderate bulging of the L3 and L4 disks.    Results for orders placed during the hospital encounter of 03/12/13    CT-TSPINE W/O PLUS RECONS    Impression  1. Mild anterior wedging of the sixth thoracic vertebral body that appears to be old.  Correlate clinically.  2. Heights and remainder of the vertebral bodies maintained.  Multilevel degenerative changes.  3. Posterior dome of the right lobe of the liver hypodensities are most likely hemangiomas and/or cysts.    Results for orders placed in visit on 01/08/13    MR-LUMBAR SPINE-W/O    Impression  1. Multilevel degenerative disk disease and facet degeneration which results in varying degrees of central canal and neural foraminal narrowing as specifically described above.    2. Grade 1 anterior subluxation at the L4-5 level.      All relevant laboratory and imaging results reported on this note were  reviewed and interpreted by me.         Assessment & Plan     Iveth Martin is a 89 y.o. female with history as noted above whose presentation merits the following diagnostic and clinical status impressions and recommendations:    1. Polymyalgia rheumatica (HCC)  Seemingly clinically active but appears to have a biomechanical component of myofascial pain, so need to reassess by checking her inflammatory markers. In the setting of her osteoporosis of left forearm, to spare her of repeated courses of steroid, may need to initiate long-term DMARD therapy with methotrexate after reviewing her labs.  - Sed Rate  - CRP QUANTITIVE (NON-CARDIAC)  - Consider initiation of methotrexate after reviewing her labs    2. Calcific tendinitis of both shoulders  Presumably the underlying etiology of her bilateral waxing/waning shoulder pain.  - Consider steroid injection if desired    3. Primary osteoarthritis of both hands  Presumably a significant contributor to her overall joint pain burden which can be managed with topical or oral NSAIDs and analgesics.  - Consider intra-articular steroid injection if becomes necessary    4. Osteoporosis of left forearm  Presumably age-related, but possibly contributed to by her long-term use of steroid.  - No definite indication for bisphosphonate therapy at this time  - Continue supplementation with vitamin D 4922-4721 IU daily with increased dietary calcium intake      FOLLOW-UP: Return in about 10 weeks (around 3/28/2023) for Short.         Thank you for the opportunity to participate in the care of Iveth Martin.    Don Ulrich MD, MS  Rheumatologist, [facility] Rhe[facility] ? [facility]   of Clinical Medicine, Department of Internal Medicine  [facility] ? [facility]

## 2023-01-17 NOTE — PATIENT INSTRUCTIONS
AFTER VISIT INSTRUCTIONS    Below are important information to help you navigate your healthcare needs and help us serve you safely and effectively:  If laboratory tests and/or imaging studies were ordered, remember to go get them done as instructed.  If new prescriptions and/or refills were sent, remember to go pick them up from your local pharmacy, or call the specialty pharmacy to request shipment.  Always take your prescription medications exactly as prescribed unless instructed otherwise.  Note that antirheumatic drugs and steroids are immunosuppressive which means they increase your risk of infections and have multiple potential adverse effects on various organ systems in your body, though most of them are uncommon.  It is important that you are up-to-date on age-appropriate immunizations, particularly shingles and bacterial/viral pneumonia vaccines, which you can request from me or your primary care provider.  Be sure to read the drug package inserts to learn about the potential side effects of your medications before you start taking them.  If you experience any significant drug side effects, stop taking the medication and notify me promptly, and depending on the severity of the side effects, consider going to an urgent care or emergency department for immediate attention.  If there are significant findings on your lab tests and imaging studies that warrant further action, I will notify you with explanations via [email or web address] or phone call, otherwise you can view them on [employer] and let me know if you have any questions.  Note that [employer] messages are typically read during office hours and may take 1-7 business days before a response depending on the urgency of the situation and how busy my clinic schedule is.  In general, [employer] messaging is for non-urgent matters that do not require immediate attention, so for urgent matters that cannot wait, you are advised to go to an urgent care.  Lastly, you are granted  MyChart access to my documentation of your visit and are encouraged to read my note which details my assessment and plan for your condition.

## 2023-01-18 ENCOUNTER — HOSPITAL ENCOUNTER (OUTPATIENT)
Dept: LAB | Facility: MEDICAL CENTER | Age: 88
End: 2023-01-18
Attending: STUDENT IN AN ORGANIZED HEALTH CARE EDUCATION/TRAINING PROGRAM
Payer: MEDICARE

## 2023-01-18 ENCOUNTER — APPOINTMENT (OUTPATIENT)
Dept: MEDICAL GROUP | Facility: MEDICAL CENTER | Age: 88
End: 2023-01-18
Payer: MEDICARE

## 2023-01-18 DIAGNOSIS — R05.2 SUBACUTE COUGH: ICD-10-CM

## 2023-01-18 LAB — CRP SERPL HS-MCNC: <0.3 MG/DL (ref 0–0.75)

## 2023-01-18 PROCEDURE — 86140 C-REACTIVE PROTEIN: CPT

## 2023-01-18 PROCEDURE — 85652 RBC SED RATE AUTOMATED: CPT

## 2023-01-18 PROCEDURE — 36415 COLL VENOUS BLD VENIPUNCTURE: CPT

## 2023-01-18 RX ORDER — ZOLPIDEM TARTRATE 5 MG/1
5 TABLET ORAL
Qty: 90 TABLET | Refills: 0 | Status: SHIPPED | OUTPATIENT
Start: 2023-01-18 | End: 2023-04-19 | Stop reason: SDUPTHER

## 2023-01-19 ENCOUNTER — PATIENT OUTREACH (OUTPATIENT)
Dept: HEALTH INFORMATION MANAGEMENT | Facility: OTHER | Age: 88
End: 2023-01-19
Payer: MEDICARE

## 2023-01-19 DIAGNOSIS — N18.31 CHRONIC KIDNEY DISEASE, STAGE 3A: ICD-10-CM

## 2023-01-19 LAB — ERYTHROCYTE [SEDIMENTATION RATE] IN BLOOD BY WESTERGREN METHOD: 7 MM/HOUR (ref 0–25)

## 2023-01-19 PROCEDURE — 99999 PR NO CHARGE: CPT | Performed by: STUDENT IN AN ORGANIZED HEALTH CARE EDUCATION/TRAINING PROGRAM

## 2023-01-19 NOTE — PROGRESS NOTES
"Called and spoke to patient to complete PCM monthly and quarterly follow-up call. Spoke to patient who states she is \"doing much better\". Patient was sick with a chest cold and chronic cough for a month. Saw Dr. Waterman in her PCP's absence who started her on a Flovent Inhaler which has really helped her. The other inhaler she was originally prescribed was too expensive and too much out of pocket for her at [place] time. This one is cheaper and has \"worked great\". Patient feels much better and has no other needs at this time. Patient hopes now that she feels better that she can start being more active again. Quarterly assessment completed. Patient remains eligible for CCM due to frequent calls for assistance to the PCM program and exacerbations of her chronic illnesses. Patient has PCM program and knows she can continue to call with any questions or needs.     Next PCM program monthly follow-up call scheduled for 2/17/2023 @ 1:30pm.  "

## 2023-02-02 ENCOUNTER — TELEPHONE (OUTPATIENT)
Dept: HEALTH INFORMATION MANAGEMENT | Facility: OTHER | Age: 88
End: 2023-02-02
Payer: MEDICARE

## 2023-02-02 NOTE — TELEPHONE ENCOUNTER
Patient called [facility] program looking for some assistance with a chronic cough she has been experiencing for a few weeks now. Due to patient's primary Care Coordinator RN Maximus leaving our department the call was transferred to this RN.    This RN returned phone call. Per Iveth, she was on the other line with our office scheduling an appointment tomorrow with Dr. Waterman to be evaluated. This RN stated that was a great idea and started to update Iveth on the plan moving forward. Iveth stated she was on the other line and needed to go.    Iveth called this RN back and apologized for cutting her off. This RN updated patient that Maximus is gone and a new RN will be taking over his patients in March. She will not be receiving a phone call in February, but if she needs anything to give us a call and one of the nurses will be able to assist.

## 2023-02-03 ENCOUNTER — OFFICE VISIT (OUTPATIENT)
Dept: MEDICAL GROUP | Facility: MEDICAL CENTER | Age: 88
End: 2023-02-03
Payer: MEDICARE

## 2023-02-03 VITALS
DIASTOLIC BLOOD PRESSURE: 70 MMHG | TEMPERATURE: 97.1 F | WEIGHT: 180.4 LBS | HEIGHT: 64 IN | BODY MASS INDEX: 30.8 KG/M2 | SYSTOLIC BLOOD PRESSURE: 114 MMHG | OXYGEN SATURATION: 92 % | HEART RATE: 81 BPM

## 2023-02-03 DIAGNOSIS — J00 ACUTE RHINITIS: ICD-10-CM

## 2023-02-03 DIAGNOSIS — R05.2 SUBACUTE COUGH: ICD-10-CM

## 2023-02-03 PROCEDURE — 99213 OFFICE O/P EST LOW 20 MIN: CPT | Performed by: STUDENT IN AN ORGANIZED HEALTH CARE EDUCATION/TRAINING PROGRAM

## 2023-02-03 RX ORDER — FLUTICASONE PROPIONATE 50 MCG
1 SPRAY, SUSPENSION (ML) NASAL DAILY
Qty: 16 G | Refills: 0 | Status: SHIPPED | OUTPATIENT
Start: 2023-02-03 | End: 2023-02-27

## 2023-02-03 ASSESSMENT — ENCOUNTER SYMPTOMS
HEADACHES: 0
WEIGHT LOSS: 0
FEVER: 0
CHILLS: 0
VOMITING: 0
DIZZINESS: 0
PALPITATIONS: 0
WHEEZING: 0
NAUSEA: 0
SHORTNESS OF BREATH: 0

## 2023-02-03 ASSESSMENT — FIBROSIS 4 INDEX: FIB4 SCORE: 1.19

## 2023-02-03 NOTE — PROGRESS NOTES
"Subjective:     CC: URI, rhinitis    HPI:   Iveth presents today with    Problem   Subacute Cough    - History of cough x 5 weeks  - She reports history of asthma diagnosed in 1999, however, PFT from 02/2022 showed normal PFT:  \"There is no evidence of obstruction, manifested by FEV1/FVC ratio of 80.16%.  Noted that the FEV1 was 100% in the post-bronchodilator arm (1.69 liters).\"  - of note has history of PMR, now in remission    01/03/2022-  She report she never received qvar because supplier was out. She does have chronic post nasal drip worse in the morning for which she takes chlorpheniramine. She has GERD with hiatal hernia and takes omeprazole 40mg BID per GI. Stopped smoking in 1987. She is on losartan.     02/03/2023  - report her cough improved. Doing well on flovent, no longer requiring albuterol  5 times per day. Previously when she was well she required albuterol twice a day.            # Acute Rhinitis  - started 1/26/2023 to head cold with significant clear rhinitis, now improving.   - taking lozenges, able to go back to sleep for a couple outs      Health Maintenance: follow with primary    ROS:  Review of Systems   Constitutional:  Negative for chills, fever and weight loss.   HENT:  Negative for hearing loss.    Respiratory:  Negative for shortness of breath and wheezing.    Cardiovascular:  Negative for chest pain and palpitations.   Gastrointestinal:  Negative for nausea and vomiting.   Genitourinary:  Negative for frequency and urgency.   Skin:  Negative for rash.   Neurological:  Negative for dizziness and headaches.     Objective:     Exam:  /70 (BP Location: Left arm, Patient Position: Sitting, BP Cuff Size: Adult)   Pulse 81   Temp 36.2 °C (97.1 °F) (Temporal)   Ht 1.613 m (5' 3.5\")   Wt 81.8 kg (180 lb 6.4 oz)   SpO2 92%   BMI 31.45 kg/m²  Body mass index is 31.45 kg/m².    Physical Exam  Constitutional:       Appearance: Normal appearance.   Cardiovascular:      Rate and " Rhythm: Normal rate and regular rhythm.   Pulmonary:      Effort: Pulmonary effort is normal.      Breath sounds: Normal breath sounds.   Neurological:      Mental Status: She is alert.         Labs:     Assessment & Plan:     89 y.o. female with the following -     1. Subacute cough  Patient was last seen for chronic/subacute cough with possible reactive airway disease.  She reports she was using albuterol about 3-5 times per day.  She was prescribed Flovent twice daily.  She reports she was using the medication and reports significant improvement and no longer requiring albuterol.  Plan continue Flovent twice daily May consider titrating down to a lower dose in the future    2. Acute rhinitis  Acute, uncontrolled  Acute rhinitis likely from viral URI with associated posterior oropharynx inflammation/erythema and cough.  No evidence of pneumonia patient is without fever or confusion no increase in respiration rate.  Recommend Flonase over-the-counter Robitussin and continue Flovent.  - fluticasone (FLONASE) 50 MCG/ACT nasal spray; Administer 1 Spray into affected nostril(S) every day.  Dispense: 16 g; Refill: 0          Has follow-up with primary care in 1 month    Please note that this dictation was created using voice recognition software. I have made every reasonable attempt to correct obvious errors, but I expect that there are errors of grammar and possibly content that I did not discover before finalizing the note.

## 2023-03-03 DIAGNOSIS — R05.2 SUBACUTE COUGH: ICD-10-CM

## 2023-03-03 DIAGNOSIS — J00 ACUTE RHINITIS: ICD-10-CM

## 2023-03-06 RX ORDER — FLUTICASONE PROPIONATE 50 MCG
1 SPRAY, SUSPENSION (ML) NASAL DAILY
Qty: 48 ML | Refills: 2 | Status: SHIPPED | OUTPATIENT
Start: 2023-03-06 | End: 2023-03-07

## 2023-03-07 ENCOUNTER — PATIENT MESSAGE (OUTPATIENT)
Dept: MEDICAL GROUP | Facility: MEDICAL CENTER | Age: 88
End: 2023-03-07
Payer: MEDICARE

## 2023-03-14 ENCOUNTER — OFFICE VISIT (OUTPATIENT)
Dept: MEDICAL GROUP | Facility: MEDICAL CENTER | Age: 88
End: 2023-03-14
Payer: MEDICARE

## 2023-03-14 VITALS
BODY MASS INDEX: 31.07 KG/M2 | HEIGHT: 64 IN | SYSTOLIC BLOOD PRESSURE: 122 MMHG | TEMPERATURE: 98 F | HEART RATE: 88 BPM | OXYGEN SATURATION: 94 % | DIASTOLIC BLOOD PRESSURE: 68 MMHG | WEIGHT: 182 LBS

## 2023-03-14 DIAGNOSIS — M35.3 POLYMYALGIA RHEUMATICA (HCC): ICD-10-CM

## 2023-03-14 DIAGNOSIS — M81.8 OTHER OSTEOPOROSIS WITHOUT CURRENT PATHOLOGICAL FRACTURE: ICD-10-CM

## 2023-03-14 DIAGNOSIS — Z02.9 ADMINISTRATIVE ENCOUNTER: ICD-10-CM

## 2023-03-14 DIAGNOSIS — R73.03 PREDIABETES: ICD-10-CM

## 2023-03-14 PROBLEM — D72.829 LEUKOCYTOSIS: Status: RESOLVED | Noted: 2021-12-20 | Resolved: 2023-03-14

## 2023-03-14 PROBLEM — E61.1 LOW IRON: Status: RESOLVED | Noted: 2021-12-20 | Resolved: 2023-03-14

## 2023-03-14 PROBLEM — D75.839 THROMBOCYTOSIS: Status: RESOLVED | Noted: 2021-12-20 | Resolved: 2023-03-14

## 2023-03-14 PROBLEM — I12.9 HYPERTENSIVE RENAL DISEASE: Status: RESOLVED | Noted: 2021-12-20 | Resolved: 2023-03-14

## 2023-03-14 PROBLEM — R94.4 ABNORMAL RESULTS OF KIDNEY FUNCTION STUDIES: Status: RESOLVED | Noted: 2021-12-08 | Resolved: 2023-03-14

## 2023-03-14 PROCEDURE — 99213 OFFICE O/P EST LOW 20 MIN: CPT | Performed by: STUDENT IN AN ORGANIZED HEALTH CARE EDUCATION/TRAINING PROGRAM

## 2023-03-14 ASSESSMENT — ENCOUNTER SYMPTOMS
CHILLS: 0
PALPITATIONS: 0
HEADACHES: 0
WHEEZING: 0
NAUSEA: 0
SHORTNESS OF BREATH: 0
FEVER: 0
VOMITING: 0
DIZZINESS: 0
WEIGHT LOSS: 0

## 2023-03-14 ASSESSMENT — FIBROSIS 4 INDEX: FIB4 SCORE: 1.19
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
## 2023-03-27 ASSESSMENT — RHEUMATOLOGY FOLLOW-UP QUESTIONNAIRE
DRY MOUTH: N
THYROID PAIN: N
SHORTNESS OF BREATH: N
BLOOD IN URINE: N
DIZZINESS: N
NAUSEA: N
HAIR SHEDDING: N
GENITAL ULCERS: N
ORAL ULCERS: N
HAIR LOSS WITH BALD SPOTS: N
ABDOMINAL PAIN: N
EYE REDNESS: N
BODY ACHES: Y
CHILLS: N
JOINT PAIN: SAME WITH ACTIVITY
BLEEDING GUMS: N
SNORING: N
PRECEDING INCIDENT OR AILMENT: PMR
MARK ALL THE AREAS OF PAIN: 83954556
NECK PAIN: N
DEPRESSION: N
TINGLING: Y
SINUS PAIN: N
ALLEVIATING_FACTORS: HEAT
EAR PAIN: N
NOSEBLEEDS: N
HEARING LOSS: N
BLOODY CONSTIPATION: N
VISION LOSS: N
CAVITIES: N
CHEST PAIN WITH BREATHING: N
NIGHT SWEATS: N
COUGH WITH BLOODY PHLEGM: N
IRREGULAR BEATS: N
TEMPLE PAIN: N
ABNORMAL DISCHARGE: N
HEARTBURN: N
ANXIETY: N
SORE THROAT: N
DRY COUGH: N
DIFFICULTY SWALLOWING: N
MUSCLE WEAKNESS: N
SPASMS: N
GOITER: N
BURNING URINATION: N
VERTIGO: N
FEVERS: N
DRY EYES: Y
BLURRINESS: N
DIFFICULTY SPEAKING: N
ACHILLES TENDON PAIN: N
BLOODY DIARRHEA: N
EYE PAIN: N
RINGING IN EARS: N
PALPITATIONS: N
VOMITING: N
NASAL ULCERS: N
MUSCLE PAIN: Y
PELVIC PAIN: N
HEADACHES: N
NUMBNESS: N
FROTHY URINE: N

## 2023-03-28 ENCOUNTER — OFFICE VISIT (OUTPATIENT)
Dept: RHEUMATOLOGY | Facility: MEDICAL CENTER | Age: 88
End: 2023-03-28
Attending: STUDENT IN AN ORGANIZED HEALTH CARE EDUCATION/TRAINING PROGRAM
Payer: MEDICARE

## 2023-03-28 VITALS
RESPIRATION RATE: 14 BRPM | DIASTOLIC BLOOD PRESSURE: 60 MMHG | TEMPERATURE: 97.7 F | HEART RATE: 88 BPM | SYSTOLIC BLOOD PRESSURE: 130 MMHG | BODY MASS INDEX: 31.21 KG/M2 | WEIGHT: 179 LBS | OXYGEN SATURATION: 90 %

## 2023-03-28 DIAGNOSIS — M06.4 UNDIFFERENTIATED INFLAMMATORY ARTHRITIS (HCC): ICD-10-CM

## 2023-03-28 DIAGNOSIS — M81.0 OSTEOPOROSIS OF FOREARM: ICD-10-CM

## 2023-03-28 DIAGNOSIS — M75.32 CALCIFIC TENDINITIS OF BOTH SHOULDERS: ICD-10-CM

## 2023-03-28 DIAGNOSIS — M75.31 CALCIFIC TENDINITIS OF BOTH SHOULDERS: ICD-10-CM

## 2023-03-28 DIAGNOSIS — M19.041 PRIMARY OSTEOARTHRITIS OF BOTH HANDS: ICD-10-CM

## 2023-03-28 DIAGNOSIS — M19.042 PRIMARY OSTEOARTHRITIS OF BOTH HANDS: ICD-10-CM

## 2023-03-28 PROBLEM — M19.90 UNDIFFERENTIATED INFLAMMATORY ARTHRITIS: Status: ACTIVE | Noted: 2023-03-28

## 2023-03-28 PROCEDURE — 99212 OFFICE O/P EST SF 10 MIN: CPT | Performed by: STUDENT IN AN ORGANIZED HEALTH CARE EDUCATION/TRAINING PROGRAM

## 2023-03-28 PROCEDURE — 99214 OFFICE O/P EST MOD 30 MIN: CPT | Performed by: STUDENT IN AN ORGANIZED HEALTH CARE EDUCATION/TRAINING PROGRAM

## 2023-03-28 ASSESSMENT — FIBROSIS 4 INDEX: FIB4 SCORE: 1.19

## 2023-03-28 NOTE — PATIENT INSTRUCTIONS
AFTER VISIT INSTRUCTIONS    Below are important information to help you navigate your healthcare needs and help us serve you safely and effectively:  If laboratory tests and/or imaging studies were ordered, remember to go get them done as instructed.  If new prescriptions and/or refills were sent, remember to go pick them up from your local pharmacy, or call the specialty pharmacy to request shipment.  Always take your prescription medications exactly as prescribed unless instructed otherwise.  Note that antirheumatic drugs and steroids are immunosuppressive which means they increase your risk of infections and have multiple potential adverse effects on various organ systems in your body, though most of them are uncommon.  It is important that you are up-to-date on age-appropriate immunizations, particularly shingles and bacterial/viral pneumonia vaccines, which you can request from me or your primary care provider.  Be sure to read the drug package inserts to learn about the potential side effects of your medications before you start taking them.  If you experience any significant drug side effects, stop taking the medication and notify me promptly, and depending on the severity of the side effects, consider going to an urgent care or emergency department for immediate attention.  If there are significant findings on your lab tests and imaging studies that warrant further action, I will notify you with explanations via [employer] or phone call, otherwise you can view them on [employer] and let me know if you have any questions.  Note that [employer] messages are typically read during office hours and may take 1-7 business days before a response depending on the urgency of the situation and how busy my clinic schedule is.  In general, [employer] messaging is for non-urgent matters that do not require immediate attention, so for urgent matters that cannot wait, you are advised to go to an urgent care.  Lastly, you are granted  MyChart access to my documentation of your visit and are encouraged to read my note which details my assessment and plan for your condition.

## 2023-03-28 NOTE — PROGRESS NOTES
[facility] RHEUMATOLOGY  [address] [facility] [facility], [address], [place] [address]  Phone: [phone] ? Fax: [phone]    RHEUMATOLOGY FOLLOW-UP VISIT NOTE      DATE OF SERVICE: 03/28/2023         Subjective     PRIMARY CARE PRACTITIONER:  Belkis Vanegas P.A.-C.  [address] [facility] [address]  [place] [address]    PATIENT IDENTIFICATION:  Iveth Martin  [address] [place] [address]    YOB: 1933    MEDICAL RECORD NUMBER: [id number]          CHIEF COMPLAINT:   Chief Complaint   Patient presents with    Follow-Up     PMR       RHEUMATOLOGIC HISTORY:  Iveth Martin is a 89 y.o. 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 Previously under the care of a local rheumatologist who has retired (Dr. Scott Bryant), she initially presented on 10/6/22 to establish care for continued evaluation and management of her condition. Reported waxing/waning joint/muscle pain in her hands (particularly her right fifth DIP joint which was very tender/swollen for a couple of months prior), shoulders, and lower back. These were associated with over 1 hour of morning stiffness that improved with activity, but her joint pain tended to worsen with activity especially in her hands as she is a long-term .     Pertinent treatment history: Prednisone taper 15 >> 2.5 > 1 mg daily (6/2022-12/2022, helpful), gabapentin 100 mg TID (unclear benefit).    Pertinent laboratory results: Positive TONY with borderline anti-dsDNA 10 (in 10/2021); negative/normal  RF and anti-CCP (in 10/2021), HLA-B27 (in 10/2022), TSH, LFT, and unremarkable CBC in (9/2022), ESR and CRP (in 1/2023).    Pertinent XR imaging as of 8/2021 (CXR): Shoulders with bilateral rotator cuff calcifications consistent with calcific tendinopathy.    INTERVAL HISTORY:  [facility] Rheumatology Established Patient History Form    3/27/2023 10:50 AM PDT - Filed by Patient   Chief Complaint Joint/muscle pain/arthritis?   Interval History of Present Condition   Date of worsening symptom onset: 12/1/2022   Preceding incident/ailment: PMR   Describe/list your symptoms: Pain where legs attach to torso;  muscle pain upper arms into rotator cuffs;  muscles going into back of neck;  pain in hand joints and wrists  w/ sporadic swelling   Aggravating factors: They are just there   Alleviating factors: Heat   Helpful medications: Prednisone d/c;  Voltaren on hands and wrist  Orthopedic injections in shoulders muscles   Ineffective medications:    Symptom severity/impact (scale of 1-10):    Personal/emotional stressors: 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   [place] [facility] Locations Of Pain    REVIEW OF SYSTEMS    General   Fevers No   Chills No   Night sweats No   Unintentional Weight Loss None   Musculoskeletal   Joint pain Same with activity   Morning stiffness Most day   Joint swelling    Achilles tendon pain No   Muscle pain Yes   Body Aches Yes   Dermatologic   Hair loss with bald spots No   Hair shedding No   Sunlight-induced skin rash    Skin thickening    Skin plaques    Cold-induced color changes (white, purple, red on rewarming)    Neurologic/Psychiatric   Muscle weakness No   Spasms No   Tingling Yes   Numbness No   Anxiety No   Depression No   Head/Eyes   Headaches No   Temple pain No   Dizziness No   Dry eyes Yes   Eye pain No   Eye redness No   Blurriness No   Vision loss No   Ears/Nose   Ear pain No   Ringing in ears No   Vertigo No   Hearing loss No   Nasal ulcers No    Nosebleeds No   Sinus pain No   Sinonasal congestion    Snoring No   Mouth/Throat   Oral ulcers No   Bleeding gums No   Dry mouth No   Cavities No   Sore throat No   Difficulty speaking No   Difficulty swallowing No   Neck/Lymphatics   Neck pain No   Thyroid pain No   Goiter No   Swollen Glands    Cardiac/Respiratory   Chest pain with breathing No   Dry cough No   Cough with bloody phlegm No   Shortness of breath No   Palpitations No   Irregular beats No   Gastrointestinal   Nausea No   Vomiting No   Heartburn No   Abdominal pain No   Bloody diarrhea No   Bloody constipation No   Genitourinary   Pelvic Pain No   Genital ulcers No   Abnormal discharge No   Burning urination No   Frothy urine No   Blood in urine No   Supplemental Information   Notable Life Changes/Adjustments Since Last Visit inablity to use hands/fingers as needed for performance       ACTIVE PROBLEM LIST:  Patient Active Problem List    Diagnosis Date Noted    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    Osteoporosis of left forearm 01/17/2023    Subacute cough 01/03/2023    Other osteoporosis without current pathological fracture 01/03/2023    Long term current use of systemic steroids 10/06/2022    Calcific tendinitis of both shoulders 10/06/2022    Primary osteoarthritis of both hands 10/06/2022    Vitamin D deficiency 12/20/2021    Prediabetes 12/20/2021    NAFLD (nonalcoholic fatty liver disease) 12/20/2021    Hypercholesterolemia 12/20/2021    Elevated antibody levels 12/20/2021    CRP elevated 12/20/2021    Chronic kidney disease, stage 3a (HCC) 12/20/2021    Arthralgia 12/20/2021    Atherosclerosis of aorta (HCC) 12/08/2021    Cerebral atrophy ([facility]) 12/08/2021    Obesity (BMI 30.0-34.9) 12/08/2021    Gastroesophageal reflux disease 12/08/2021    Hypertension 12/08/2021    Polymyalgia rheumatica (HCC) 12/08/2021    Chronic bilateral back pain 12/08/2021    Insomnia 12/08/2021    Sedative hypnotic or anxiolytic dependence  "([facility]) 12/08/2021       PAST MEDICAL HISTORY:  Past Medical History:   Diagnosis Date    Hyperlipidemia     Hypertension        PAST SURGICAL HISTORY:  Past Surgical History:   Procedure Laterality Date    PB WRIST ARTHROSCOP,RELEASE XVERS LIG Left 8/11/2021    Procedure: LEFT ENDOSCOPIC CARPAL TUNNEL RELEASE;  Surgeon: Pee Khan M.D.;  Location: [facility];  Service: Orthopedics    PB INCISE FINGER TENDON SHEATH Left 8/11/2021    Procedure: LEFT LONG AND RING TRIGGER FINGER RELEASE;  Surgeon: Pee Khan M.D.;  Location: [facility];  Service: Orthopedics       MEDICATIONS:  Current Outpatient Medications   Medication Sig    zolpidem (AMBIEN) 5 MG Tab Take 1 Tablet by mouth at bedtime as needed for Sleep for up to 90 days.    lovastatin (MEVACOR) 40 MG tablet TAKE 1 TABLET BY MOUTH EVERY DAY IN THE EVENING    albuterol 108 (90 Base) MCG/ACT Aero Soln inhalation aerosol INHALE 2 PUFFS BY INHALATION ROUTE EVERY 4-6 HRS AS NEEDED    methocarbamol (ROBAXIN) 750 MG Tab     hydroCHLOROthiazide (HYDRODIURIL) 25 MG Tab TAKE 1 TABLET BY MOUTH EVERY DAY AS NEEDED    gabapentin (NEURONTIN) 100 MG Cap Take 1 Capsule by mouth 3 times a day.    Cholecalciferol (VITAMIN D3) 2000 UNIT Cap Take 2 Capsules by mouth every day.    chlorpheniramine (CHLOR-TRIMETRON) 4 MG Tab Take 1 Tablet by mouth every day. Second dose in the evening PRN    losartan (COZAAR) 50 MG Tab Take 1 Tablet by mouth every day.    omeprazole (PRILOSEC) 40 MG delayed-release capsule Take 1 Capsule by mouth 2 (two) times a day.      Dextromethorphan-Guaifenesin (MUCINEX DM PO) Take 1 Tablet by mouth 1 time a day as needed (Mucus).         ALLERGIES:   Allergies   Allergen Reactions    Celebrex [Celecoxib] Unspecified     GERD reaction    Lisinopril Cough    Nsaids Unspecified     GERD reaction    Other Drug Unspecified     bioxin    Clarithromycin Vomiting    Benadryl Allergy Unspecified     \"tongue gets " "fuzzy and feel like I am drugged\"       IMMUNIZATIONS:  Immunization History   Administered Date(s) Administered    Influenza Seasonal Injectable - Historical Data 10/15/2013    Influenza Vaccine Adult HD 2014, 2020, 10/05/2022    Influenza Vaccine Pediatric Split - Historical Data 2010    Influenza, Unspecified - HISTORICAL DATA 10/15/2019, 10/21/2021    MODERNA SARS-COV-2 VACCINE (12+) 2021, 2021, 2021    PFIZER BIVALENT BOOSTER SARS-COV-2 VACCINE (12+) 10/05/2022    PFIZER ASTUDILLO CAP SARS-COV-2 VACCINATION (12+) 2022    Pneumococcal Conjugate Vaccine (Prevnar/PCV-13) 2014    Pneumococcal polysaccharide vaccine (PPSV-23) 2018    Tdap Vaccine 2013       SOCIAL HISTORY:   Social History     Socioeconomic History    Marital status:    Tobacco Use    Smoking status: Former     Packs/day: 1.00     Years: 30.00     Pack years: 30.00     Types: Cigarettes     Quit date:      Years since quittin.2    Smokeless tobacco: Never   Vaping Use    Vaping Use: Never used   Substance and Sexual Activity    Alcohol use: Yes     Comment: rare    Drug use: No     Social Determinants of Health     Financial Resource Strain: Low Risk     Difficulty of Paying Living Expenses: Not hard at all   Food Insecurity: No Food Insecurity    Worried About Running Out of Food in the Last Year: Never true    Ran Out of Food in the Last Year: Never true   Transportation Needs: No Transportation Needs    Lack of Transportation (Medical): No    Lack of Transportation (Non-Medical): No   Physical Activity: Inactive    Days of Exercise per Week: 0 days    Minutes of Exercise per Session: 0 min   Stress: No Stress Concern Present    Feeling of Stress : Not at all   Social Connections: Moderately Integrated    Frequency of Communication with Friends and Family: More than three times a week    Frequency of Social Gatherings with Friends and Family: More than three times a week    " Attends [employer] Services: More than 4 times per year    Active Member of Clubs or Organizations: Yes    Attends Club or Organization Meetings: More than 4 times per year    Marital Status:    Intimate Partner Violence: Not At Risk    Fear of Current or Ex-Partner: No    Emotionally Abused: No    Physically Abused: No    Sexually Abused: No   Housing Stability: Low Risk     Unable to Pay for Housing in the Last Year: No    Number of Places Lived in the Last Year: 1    Unstable Housing in the Last Year: No       FAMILY HISTORY:  Family History   Problem Relation Age of Onset    Cancer Father     Heart Disease Father             Objective     Vital Signs: /60   Pulse 88   Temp 36.5 °C (97.7 °F) (Temporal)   Resp 14   Wt 81.2 kg (179 lb)   SpO2 90% Body mass index is 31.21 kg/m².    General: Appears well and comfortable  Eyes: No scleral or conjunctival lesions  ENT: No apparent oral or nasal lesions  Head/Neck: No apparent scalp or neck lesions  Cardiovascular: Regular rate and rhythm  Respiratory: Breathing quiet and unlabored  Gastrointestinal: No organomegaly or abdominal masses  Integumentary: No significant cutaneous lesions or discolorations  Musculoskeletal: Mild tenderness of hands (with fullness of right 2nd-3rd MCP joints), upper arms, neck/trapezius, hips greater trochanter, and thighs; overall range of motion somewhat limited by pain  Neurologic: No focal sensory or motor deficits  Psychiatric: Mood and affect appropriate      LABORATORY RESULTS REVIEWED AND INTERPRETED BY ME:  Lab Results   Component Value Date/Time    SEDRATEWES 7 01/18/2023 01:09 PM    CREACTPROT <0.30 01/18/2023 01:09 PM     Lab Results   Component Value Date/Time    IQWF23WABH Negative 10/13/2022 01:10 PM     Lab Results   Component Value Date/Time    TSHULTRASEN 2.070 09/22/2022 07:49 AM     Lab Results   Component Value Date/Time    ASTSGOT 17 09/22/2022 07:49 AM    ALTSGPT 13 09/22/2022 07:49 AM    ALKPHOSPHAT  79 09/22/2022 07:49 AM    TBILIRUBIN 0.8 09/22/2022 07:49 AM    TOTPROTEIN 6.2 09/22/2022 07:49 AM    ALBUMIN 4.1 09/22/2022 07:49 AM     Lab Results   Component Value Date/Time    SODIUM 138 09/22/2022 07:49 AM    POTASSIUM 3.6 09/22/2022 07:49 AM    CHLORIDE 99 09/22/2022 07:49 AM    CO2 30 09/22/2022 07:49 AM    GLUCOSE 105 (H) 09/22/2022 07:49 AM    BUN 16 09/22/2022 07:49 AM    CREATININE 0.95 09/22/2022 07:49 AM    CREATININE 0.9 12/12/2007 12:30 PM    BUNCREATRAT 20 10/01/2021 08:17 AM    CALCIUM 9.4 09/22/2022 07:49 AM     Lab Results   Component Value Date/Time    WBC 9.1 09/22/2022 07:49 AM    RBC 5.18 09/22/2022 07:49 AM    HEMOGLOBIN 15.3 09/22/2022 07:49 AM    HEMATOCRIT 46.0 09/22/2022 07:49 AM    MCV 88.8 09/22/2022 07:49 AM    MCH 29.5 09/22/2022 07:49 AM    MCHC 33.3 (L) 09/22/2022 07:49 AM    RDW 47.1 09/22/2022 07:49 AM    PLATELETCT 353 09/22/2022 07:49 AM    MPV 10.3 09/22/2022 07:49 AM    NEUTS 12.72 (H) 12/24/2021 12:10 PM    LYMPHOCYTES 9.60 (L) 12/24/2021 12:10 PM    MONOCYTES 5.20 12/24/2021 12:10 PM    EOSINOPHILS 0.90 12/24/2021 12:10 PM    BASOPHILS 0.00 12/24/2021 12:10 PM    ANISOCYTOSIS 1+ 12/24/2021 12:10 PM     Lab Results   Component Value Date/Time    CHOLSTRLTOT 202 (H) 09/22/2022 07:49 AM     (H) 09/22/2022 07:49 AM    HDL 64 09/22/2022 07:49 AM    TRIGLYCERIDE 187 (H) 09/22/2022 07:49 AM    HBA1C 6.7 (H) 09/22/2022 07:49 AM       RADIOLOGY RESULTS REVIEWED AND INTERPRETED BY ME:  Results for orders placed in visit on 07/13/21    DX-HAND 3+ LEFT    Results for orders placed in visit on 10/06/22    DS-BONE DENSITY STUDY (DEXA)    Impression  According to the World Health Organization classification, bone mineral density of this patient is osteoporotic in the distal left forearm and normal in the proximal left femur.    10-year Probability of Fracture:  Major Osteoporotic     16.8%  Hip     5.3%  Population      [place] ()    Based on left femur neck BMD    Results for  orders placed during the hospital encounter of 03/12/13    CT-LSPINE W/O PLUS RECONS    Impression  1. No acute fracture or dislocation lumbar spine.  Multilevel degenerative changes.  2. Grade 1 L4 anterolisthesis.  No definite pars defect.  3. Densities within the lumbar spine and visualized pelvis nonspecific and could be multiple bone islands.  Osteopoikilosis a consideration.  Metastatic disease cannot be excluded.  4. Moderate bulging of the L3 and L4 disks.    Results for orders placed during the hospital encounter of 03/12/13    CT-TSPINE W/O PLUS RECONS    Impression  1. Mild anterior wedging of the sixth thoracic vertebral body that appears to be old.  Correlate clinically.  2. Heights and remainder of the vertebral bodies maintained.  Multilevel degenerative changes.  3. Posterior dome of the right lobe of the liver hypodensities are most likely hemangiomas and/or cysts.    Results for orders placed in visit on 01/08/13    MR-LUMBAR SPINE-W/O    Impression  1. Multilevel degenerative disk disease and facet degeneration which results in varying degrees of central canal and neural foraminal narrowing as specifically described above.    2. Grade 1 anterior subluxation at the L4-5 level.      All relevant laboratory and imaging results reported on this note were reviewed and interpreted by me.         Assessment & Plan     Iveth Martin is a 89 y.o. female with history as noted above whose presentation merits the following diagnostic and clinical status impressions and recommendations:    1. Undifferentiated inflammatory arthritis (HCC)  Presentation has features of both inflammatory arthritis (CTD-spectrum arthritis given positive TONY with anti-dsDNA versus her known PMR) and biomechanical arthralgia (presumably osteoarthritis and/or myofascial pain syndrome). In any case, reasonable to undertake further investigation with the workup noted below for exclusionary, confirmatory, and risk stratification  purposes. In the setting of her osteoporosis of left forearm, to spare her of repeated courses of steroid, may need to initiate long-term DMARD therapy with methotrexate after reviewing her workup.  - TONY REFLEXIVE PROFILE; Future  - RHEUMATOID ARTHRITIS FACTOR; Future  - CCP ANTIBODY; Future  - Miscellaneous Test; Future  - Sed Rate; Future  - CRP QUANTITIVE (NON-CARDIAC); Future  - DX-SHOULDER 2+ LEFT; Future  - DX-SHOULDER 2+ RIGHT; Future  - DX-HIP-BILATERAL-WITH PELVIS-2 VIEWS; Future  - Consider initiation of methotrexate after reviewing her workup    2. Calcific tendinitis of both shoulders  Presumably the underlying etiology of her bilateral waxing/waning shoulder pain likely with a component of osteoarthritis.  - Additional workup as noted above  - Consider repeat steroid injection depending on her clinical trajectory over time    3. Primary osteoarthritis of both hands  Presumably a significant contributor to her overall joint pain burden which can be managed with analgesics or topical NSAIDs as oral NSAIDs need to be avoided in the setting of her chronic renal insufficiency.  - Consider intra-articular steroid injection if becomes necessary    4. Osteoporosis of left forearm  Presumably age-related, but possibly contributed to by her long-term use of steroid.  - No definite indication for bisphosphonate therapy at this time  - Continue supplementation with vitamin D 6574-6339 IU daily with increased dietary calcium intake      FOLLOW-UP: Return in about 2 months (around 5/28/2023) for Short.         Thank you for the opportunity to participate in the care of Iveth Martin.    Don Ulrich MD, MS  Rheumatologist, [facility] Rheumatology ? [facility]   of Clinical Medicine, Department of Internal Medicine  [facility] ? [facility]

## 2023-04-07 ENCOUNTER — TELEPHONE (OUTPATIENT)
Dept: RHEUMATOLOGY | Facility: MEDICAL CENTER | Age: 88
End: 2023-04-07

## 2023-04-07 NOTE — TELEPHONE ENCOUNTER
VOICEMAIL  1. Caller Name: Iveth                    Call Back Number: [phone]    2. Message: Patient left voicemail to , informing him that she was able to get an appt scheduled for her imaging. Appt is 4/15/23    3. Patient approves office to leave a detailed voicemail/MyChart message: no

## 2023-04-10 ENCOUNTER — PATIENT OUTREACH (OUTPATIENT)
Dept: HEALTH INFORMATION MANAGEMENT | Facility: OTHER | Age: 88
End: 2023-04-10
Payer: MEDICARE

## 2023-04-10 DIAGNOSIS — N18.31 CHRONIC KIDNEY DISEASE, STAGE 3A: ICD-10-CM

## 2023-04-10 DIAGNOSIS — I10 HYPERTENSION, UNSPECIFIED TYPE: ICD-10-CM

## 2023-04-10 DIAGNOSIS — M79.604 BILATERAL LEG PAIN: ICD-10-CM

## 2023-04-10 DIAGNOSIS — M79.605 BILATERAL LEG PAIN: ICD-10-CM

## 2023-04-10 PROCEDURE — 99490 CHRNC CARE MGMT STAFF 1ST 20: CPT | Performed by: STUDENT IN AN ORGANIZED HEALTH CARE EDUCATION/TRAINING PROGRAM

## 2023-04-11 NOTE — PROGRESS NOTES
Assessment  Spoke with Iveth for monthly outreach follow up. Iveth states that she is doing well. She spent Easter and her birthday with friends and family. She said it was a very special birthday. She stated she is having some trouble with her Rheumatologist was following up on that. She reports she has lost 3 pounds recently. She did not get a change to follow up with her referral to [facility]. She is ready to get established with them now. Her referral has , will follow up with PCP for renewal. Iveth stated that RN Car Coordinator could communicate via [employer] on the status of her referral. RN care Coordinator's contact number given. Encouraged Iveth to call with any questions or concerns.     Education  Discussed follow appointments and referral     Care Plan  Continue regular exercise     Progress:  Progressing    Next outreach:  1 month     Chart reviewed for Quarterly Assessment, patient continues to qualify and benefit from PCM program

## 2023-04-13 ENCOUNTER — PATIENT OUTREACH (OUTPATIENT)
Dept: HEALTH INFORMATION MANAGEMENT | Facility: OTHER | Age: 88
End: 2023-04-13
Payer: MEDICARE

## 2023-04-13 DIAGNOSIS — I10 HYPERTENSION, UNSPECIFIED TYPE: ICD-10-CM

## 2023-04-13 NOTE — PROGRESS NOTES
CHW Clay received a call from the pt requesting an appointment with her provider because of a cough the pt has that is producing green phlegm. This CHW scheduled her an appointment with her provider on 4/14 At 12:20 pm and reminded the pt to be here at least 15 min early.

## 2023-04-14 ENCOUNTER — OFFICE VISIT (OUTPATIENT)
Dept: MEDICAL GROUP | Facility: MEDICAL CENTER | Age: 88
End: 2023-04-14
Payer: MEDICARE

## 2023-04-14 VITALS
BODY MASS INDEX: 30.05 KG/M2 | RESPIRATION RATE: 16 BRPM | WEIGHT: 176 LBS | HEIGHT: 64 IN | HEART RATE: 87 BPM | TEMPERATURE: 97.7 F | DIASTOLIC BLOOD PRESSURE: 74 MMHG | OXYGEN SATURATION: 93 % | SYSTOLIC BLOOD PRESSURE: 118 MMHG

## 2023-04-14 DIAGNOSIS — R05.2 SUBACUTE COUGH: ICD-10-CM

## 2023-04-14 PROCEDURE — 99213 OFFICE O/P EST LOW 20 MIN: CPT | Performed by: STUDENT IN AN ORGANIZED HEALTH CARE EDUCATION/TRAINING PROGRAM

## 2023-04-14 RX ORDER — FLUTICASONE PROPIONATE 110 UG/1
1 AEROSOL, METERED RESPIRATORY (INHALATION) 2 TIMES DAILY
Qty: 12 G | Refills: 3 | Status: SHIPPED | OUTPATIENT
Start: 2023-04-14 | End: 2023-11-29

## 2023-04-14 ASSESSMENT — FIBROSIS 4 INDEX: FIB4 SCORE: 1.2

## 2023-04-14 NOTE — PROGRESS NOTES
"Chief Complaint   Patient presents with    Cough        HPI: Patient is a 89 y.o. female complaining of cough.    Subacute cough  Patient has been seen on monthly over the last 4 months by Dr. Gonzalez & myself for chronic cough.  Concern for reactive airway disease, patient using albuterol 3-5 times per day and started on Flovent twice daily.  Patient noted that use of Flovent reduced albuterol use.  Patient was also treated for acute rhinitis, started on Flonase.  Patient notes that she was feeling much better on these medications but stopped them.    Patient presents today for concern that cough has returned.  Patient notes that it started 3 days ago.  Patient's states that it is occasionally productive.  Patient notes that when she tilts her head back she feels her sinuses drain.  Patient is not currently taking any medication for it.         ROS:  No fever,nausea, changes in bowel movements or skin rash.         EXAM:  /74 (BP Location: Left arm, Patient Position: Sitting, BP Cuff Size: Adult)   Pulse 87   Temp 36.5 °C (97.7 °F) (Temporal)   Resp 16   Ht 1.613 m (5' 3.5\")   Wt 79.8 kg (176 lb)   SpO2 93%   General: Alert, no conversational dyspnea or audible wheeze, non-toxic appearance.  Eyes: PERRL, conjunctiva slightly injected, no eye discharge.  Ears: Normal pinnae,TM's normal bilaterally.  Nares: Patent with thin mucus.  Sinuses: tender over maxillary / frontal sinuses.  Throat: Erythematous injection without exudate.   Neck: Supple, with no adenopathy.  Lungs: Clear to auscultation bilaterally, no wheeze, crackles or rhonchi.   Heart: Regular rate without murmur.  Skin: Warm and dry without rash.     1. Subacute cough  Acute on chronic.  Patient having a flare of chronic condition.  Patient advised of reactive airways disease.  Patient noted significant benefit on Flovent but stopped taking.  Patient advised to restart on Flovent and only use albuterol as needed.  Patient advised to use Flonase " daily and daily antihistamine.  Discussed preventative treatment discussed with patient that this is preventative treatment for reactive airways and that she can continue on this treatment regiment daily to prevent cough.  We will follow-up in 2 months for routine visit.  - fluticasone (FLOVENT HFA) 110 MCG/ACT Aerosol; Inhale 1 Puff 2 times a day.  Dispense: 12 g; Refill: 3

## 2023-04-15 ENCOUNTER — HOSPITAL ENCOUNTER (OUTPATIENT)
Dept: RADIOLOGY | Facility: MEDICAL CENTER | Age: 88
End: 2023-04-15
Attending: STUDENT IN AN ORGANIZED HEALTH CARE EDUCATION/TRAINING PROGRAM
Payer: MEDICARE

## 2023-04-15 DIAGNOSIS — M06.4 UNDIFFERENTIATED INFLAMMATORY ARTHRITIS (HCC): ICD-10-CM

## 2023-04-15 PROCEDURE — 73521 X-RAY EXAM HIPS BI 2 VIEWS: CPT

## 2023-04-15 PROCEDURE — 73030 X-RAY EXAM OF SHOULDER: CPT | Mod: RT

## 2023-04-15 PROCEDURE — 73030 X-RAY EXAM OF SHOULDER: CPT | Mod: LT

## 2023-04-19 DIAGNOSIS — G47.00 INSOMNIA, UNSPECIFIED TYPE: ICD-10-CM

## 2023-04-19 DIAGNOSIS — F13.20 SEDATIVE HYPNOTIC OR ANXIOLYTIC DEPENDENCE (HCC): ICD-10-CM

## 2023-04-20 RX ORDER — ZOLPIDEM TARTRATE 5 MG/1
5 TABLET ORAL
Qty: 90 TABLET | Refills: 0 | Status: SHIPPED | OUTPATIENT
Start: 2023-04-20 | End: 2023-06-03

## 2023-04-21 ENCOUNTER — PATIENT OUTREACH (OUTPATIENT)
Dept: HEALTH INFORMATION MANAGEMENT | Facility: OTHER | Age: 88
End: 2023-04-21
Payer: MEDICARE

## 2023-04-21 ENCOUNTER — PATIENT MESSAGE (OUTPATIENT)
Dept: HEALTH INFORMATION MANAGEMENT | Facility: OTHER | Age: 88
End: 2023-04-21

## 2023-04-21 DIAGNOSIS — N18.31 CHRONIC KIDNEY DISEASE, STAGE 3A: ICD-10-CM

## 2023-04-21 DIAGNOSIS — M79.604 BILATERAL LEG PAIN: ICD-10-CM

## 2023-04-21 DIAGNOSIS — I10 HYPERTENSION, UNSPECIFIED TYPE: ICD-10-CM

## 2023-04-21 DIAGNOSIS — I70.0 ATHEROSCLEROSIS OF AORTA (HCC): ICD-10-CM

## 2023-04-21 DIAGNOSIS — M79.605 BILATERAL LEG PAIN: ICD-10-CM

## 2023-04-21 NOTE — PROGRESS NOTES
Iveth left a message stating she's having difficulty with her MyChart. Returned call with no answer, left message

## 2023-04-21 NOTE — PROGRESS NOTES
Iveth returned call, she stated she has not received the fintonict message sent. Gave her the Vascular referral information by phone. [facility], [address], [place] [address], Phone: [phone]. Iveth verbalized understanding.

## 2023-05-09 ENCOUNTER — PATIENT OUTREACH (OUTPATIENT)
Dept: HEALTH INFORMATION MANAGEMENT | Facility: OTHER | Age: 88
End: 2023-05-09
Payer: MEDICARE

## 2023-05-09 DIAGNOSIS — N18.31 CHRONIC KIDNEY DISEASE, STAGE 3A: ICD-10-CM

## 2023-05-09 DIAGNOSIS — M79.604 BILATERAL LEG PAIN: ICD-10-CM

## 2023-05-09 DIAGNOSIS — I70.0 ATHEROSCLEROSIS OF AORTA (HCC): ICD-10-CM

## 2023-05-09 DIAGNOSIS — I10 HYPERTENSION, UNSPECIFIED TYPE: ICD-10-CM

## 2023-05-09 DIAGNOSIS — M79.605 BILATERAL LEG PAIN: ICD-10-CM

## 2023-05-09 PROCEDURE — 99999 PR NO CHARGE: CPT | Performed by: STUDENT IN AN ORGANIZED HEALTH CARE EDUCATION/TRAINING PROGRAM

## 2023-05-09 NOTE — PROGRESS NOTES
"Assessment  Spoke with Iveth for monthly outreach follow. 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 She states that Flovent is helping her chronic cough. 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 She denies any questions or concerns at this time. Encouraged to call with any needs.      Education  Discussed next follow up appointment     Plan of Care and Goals  Continue piano concerts and increase activity     Barriers:  Age     Progress:  Progressing     Next outreach:  1 month   "

## 2023-06-03 ENCOUNTER — HOSPITAL ENCOUNTER (INPATIENT)
Facility: MEDICAL CENTER | Age: 88
LOS: 3 days | DRG: 189 | End: 2023-06-06
Attending: EMERGENCY MEDICINE | Admitting: HOSPITALIST
Payer: MEDICARE

## 2023-06-03 ENCOUNTER — APPOINTMENT (OUTPATIENT)
Dept: RADIOLOGY | Facility: MEDICAL CENTER | Age: 88
DRG: 189 | End: 2023-06-03
Attending: EMERGENCY MEDICINE
Payer: MEDICARE

## 2023-06-03 ENCOUNTER — APPOINTMENT (OUTPATIENT)
Dept: RADIOLOGY | Facility: MEDICAL CENTER | Age: 88
DRG: 189 | End: 2023-06-03
Payer: MEDICARE

## 2023-06-03 DIAGNOSIS — B96.89 ACUTE BACTERIAL SINUSITIS: ICD-10-CM

## 2023-06-03 DIAGNOSIS — J96.01 ACUTE RESPIRATORY FAILURE WITH HYPOXIA (HCC): ICD-10-CM

## 2023-06-03 DIAGNOSIS — R09.02 HYPOXIA: ICD-10-CM

## 2023-06-03 DIAGNOSIS — J01.90 ACUTE BACTERIAL SINUSITIS: ICD-10-CM

## 2023-06-03 DIAGNOSIS — J98.01 ACUTE BRONCHOSPASM: ICD-10-CM

## 2023-06-03 PROBLEM — Z66 DNR (DO NOT RESUSCITATE): Status: ACTIVE | Noted: 2023-06-03

## 2023-06-03 LAB
ALBUMIN SERPL BCP-MCNC: 4.2 G/DL (ref 3.2–4.9)
ALBUMIN/GLOB SERPL: 1.8 G/DL
ALP SERPL-CCNC: 108 U/L (ref 30–99)
ALT SERPL-CCNC: 15 U/L (ref 2–50)
ANION GAP SERPL CALC-SCNC: 14 MMOL/L (ref 7–16)
AST SERPL-CCNC: 31 U/L (ref 12–45)
BASOPHILS # BLD AUTO: 0.2 % (ref 0–1.8)
BASOPHILS # BLD: 0.03 K/UL (ref 0–0.12)
BILIRUB SERPL-MCNC: 1 MG/DL (ref 0.1–1.5)
BUN SERPL-MCNC: 17 MG/DL (ref 8–22)
CALCIUM ALBUM COR SERPL-MCNC: 8.9 MG/DL (ref 8.5–10.5)
CALCIUM SERPL-MCNC: 9.1 MG/DL (ref 8.4–10.2)
CHLORIDE SERPL-SCNC: 94 MMOL/L (ref 96–112)
CO2 SERPL-SCNC: 25 MMOL/L (ref 20–33)
CREAT SERPL-MCNC: 0.85 MG/DL (ref 0.5–1.4)
EOSINOPHIL # BLD AUTO: 0.01 K/UL (ref 0–0.51)
EOSINOPHIL NFR BLD: 0.1 % (ref 0–6.9)
ERYTHROCYTE [DISTWIDTH] IN BLOOD BY AUTOMATED COUNT: 44.5 FL (ref 35.9–50)
FLUAV RNA SPEC QL NAA+PROBE: NEGATIVE
FLUBV RNA SPEC QL NAA+PROBE: NEGATIVE
GFR SERPLBLD CREATININE-BSD FMLA CKD-EPI: 65 ML/MIN/1.73 M 2
GLOBULIN SER CALC-MCNC: 2.3 G/DL (ref 1.9–3.5)
GLUCOSE SERPL-MCNC: 129 MG/DL (ref 65–99)
HCT VFR BLD AUTO: 42 % (ref 37–47)
HGB BLD-MCNC: 14.2 G/DL (ref 12–16)
IMM GRANULOCYTES # BLD AUTO: 0.07 K/UL (ref 0–0.11)
IMM GRANULOCYTES NFR BLD AUTO: 0.5 % (ref 0–0.9)
LACTATE SERPL-SCNC: 1 MMOL/L (ref 0.5–2)
LACTATE SERPL-SCNC: 1.1 MMOL/L (ref 0.5–2)
LYMPHOCYTES # BLD AUTO: 1.14 K/UL (ref 1–4.8)
LYMPHOCYTES NFR BLD: 8 % (ref 22–41)
MCH RBC QN AUTO: 29.1 PG (ref 27–33)
MCHC RBC AUTO-ENTMCNC: 33.8 G/DL (ref 32.2–35.5)
MCV RBC AUTO: 86.1 FL (ref 81.4–97.8)
MONOCYTES # BLD AUTO: 1.05 K/UL (ref 0–0.85)
MONOCYTES NFR BLD AUTO: 7.4 % (ref 0–13.4)
NEUTROPHILS # BLD AUTO: 11.88 K/UL (ref 1.82–7.42)
NEUTROPHILS NFR BLD: 83.8 % (ref 44–72)
NRBC # BLD AUTO: 0 K/UL
NRBC BLD-RTO: 0 /100 WBC (ref 0–0.2)
NT-PROBNP SERPL IA-MCNC: 187 PG/ML (ref 0–125)
PLATELET # BLD AUTO: 294 K/UL (ref 164–446)
PMV BLD AUTO: 10.2 FL (ref 9–12.9)
POTASSIUM SERPL-SCNC: 3.4 MMOL/L (ref 3.6–5.5)
PROCALCITONIN SERPL-MCNC: 0.09 NG/ML
PROT SERPL-MCNC: 6.5 G/DL (ref 6–8.2)
RBC # BLD AUTO: 4.88 M/UL (ref 4.2–5.4)
RSV RNA SPEC QL NAA+PROBE: NEGATIVE
SARS-COV-2 RNA RESP QL NAA+PROBE: NOTDETECTED
SODIUM SERPL-SCNC: 133 MMOL/L (ref 135–145)
SPECIMEN SOURCE: NORMAL
WBC # BLD AUTO: 14.2 K/UL (ref 4.8–10.8)

## 2023-06-03 PROCEDURE — 770001 HCHG ROOM/CARE - MED/SURG/GYN PRIV*

## 2023-06-03 PROCEDURE — 84145 PROCALCITONIN (PCT): CPT

## 2023-06-03 PROCEDURE — 36415 COLL VENOUS BLD VENIPUNCTURE: CPT

## 2023-06-03 PROCEDURE — 87486 CHLMYD PNEUM DNA AMP PROBE: CPT

## 2023-06-03 PROCEDURE — 87040 BLOOD CULTURE FOR BACTERIA: CPT

## 2023-06-03 PROCEDURE — 87633 RESP VIRUS 12-25 TARGETS: CPT

## 2023-06-03 PROCEDURE — C9803 HOPD COVID-19 SPEC COLLECT: HCPCS | Performed by: EMERGENCY MEDICINE

## 2023-06-03 PROCEDURE — 70486 CT MAXILLOFACIAL W/O DYE: CPT

## 2023-06-03 PROCEDURE — 700102 HCHG RX REV CODE 250 W/ 637 OVERRIDE(OP): Performed by: HOSPITALIST

## 2023-06-03 PROCEDURE — 99285 EMERGENCY DEPT VISIT HI MDM: CPT

## 2023-06-03 PROCEDURE — 99223 1ST HOSP IP/OBS HIGH 75: CPT | Mod: AI | Performed by: HOSPITALIST

## 2023-06-03 PROCEDURE — 87581 M.PNEUMON DNA AMP PROBE: CPT

## 2023-06-03 PROCEDURE — 94760 N-INVAS EAR/PLS OXIMETRY 1: CPT

## 2023-06-03 PROCEDURE — A9270 NON-COVERED ITEM OR SERVICE: HCPCS | Performed by: HOSPITALIST

## 2023-06-03 PROCEDURE — 83880 ASSAY OF NATRIURETIC PEPTIDE: CPT

## 2023-06-03 PROCEDURE — 700111 HCHG RX REV CODE 636 W/ 250 OVERRIDE (IP): Performed by: HOSPITALIST

## 2023-06-03 PROCEDURE — 71045 X-RAY EXAM CHEST 1 VIEW: CPT

## 2023-06-03 PROCEDURE — 700111 HCHG RX REV CODE 636 W/ 250 OVERRIDE (IP): Performed by: EMERGENCY MEDICINE

## 2023-06-03 PROCEDURE — 80053 COMPREHEN METABOLIC PANEL: CPT

## 2023-06-03 PROCEDURE — 85025 COMPLETE CBC W/AUTO DIFF WBC: CPT

## 2023-06-03 PROCEDURE — 700105 HCHG RX REV CODE 258: Performed by: EMERGENCY MEDICINE

## 2023-06-03 PROCEDURE — 96365 THER/PROPH/DIAG IV INF INIT: CPT

## 2023-06-03 PROCEDURE — 700101 HCHG RX REV CODE 250: Performed by: EMERGENCY MEDICINE

## 2023-06-03 PROCEDURE — 87798 DETECT AGENT NOS DNA AMP: CPT

## 2023-06-03 PROCEDURE — 0241U HCHG SARS-COV-2 COVID-19 NFCT DS RESP RNA 4 TRGT MIC: CPT

## 2023-06-03 PROCEDURE — 94640 AIRWAY INHALATION TREATMENT: CPT

## 2023-06-03 PROCEDURE — 83605 ASSAY OF LACTIC ACID: CPT | Mod: 91

## 2023-06-03 RX ORDER — LABETALOL HYDROCHLORIDE 5 MG/ML
10 INJECTION, SOLUTION INTRAVENOUS EVERY 4 HOURS PRN
Status: DISCONTINUED | OUTPATIENT
Start: 2023-06-03 | End: 2023-06-06 | Stop reason: HOSPADM

## 2023-06-03 RX ORDER — METHOCARBAMOL 500 MG/1
750 TABLET, FILM COATED ORAL
Status: DISCONTINUED | OUTPATIENT
Start: 2023-06-03 | End: 2023-06-05

## 2023-06-03 RX ORDER — LOVASTATIN 20 MG/1
40 TABLET ORAL EVERY EVENING
Status: DISCONTINUED | OUTPATIENT
Start: 2023-06-03 | End: 2023-06-06 | Stop reason: HOSPADM

## 2023-06-03 RX ORDER — FLUTICASONE PROPIONATE 110 UG/1
1 AEROSOL, METERED RESPIRATORY (INHALATION) 2 TIMES DAILY
Status: DISCONTINUED | OUTPATIENT
Start: 2023-06-03 | End: 2023-06-05

## 2023-06-03 RX ORDER — GUAIFENESIN 200 MG/10ML
10 LIQUID ORAL EVERY 6 HOURS PRN
COMMUNITY
End: 2023-06-22

## 2023-06-03 RX ORDER — ENOXAPARIN SODIUM 100 MG/ML
40 INJECTION SUBCUTANEOUS DAILY
Status: DISCONTINUED | OUTPATIENT
Start: 2023-06-03 | End: 2023-06-06 | Stop reason: HOSPADM

## 2023-06-03 RX ORDER — POLYETHYLENE GLYCOL 3350 17 G/17G
1 POWDER, FOR SOLUTION ORAL
Status: DISCONTINUED | OUTPATIENT
Start: 2023-06-03 | End: 2023-06-06 | Stop reason: HOSPADM

## 2023-06-03 RX ORDER — ZOLPIDEM TARTRATE 5 MG/1
5 TABLET ORAL
Status: DISCONTINUED | OUTPATIENT
Start: 2023-06-03 | End: 2023-06-06 | Stop reason: HOSPADM

## 2023-06-03 RX ORDER — ONDANSETRON 2 MG/ML
4 INJECTION INTRAMUSCULAR; INTRAVENOUS EVERY 4 HOURS PRN
Status: DISCONTINUED | OUTPATIENT
Start: 2023-06-03 | End: 2023-06-06 | Stop reason: HOSPADM

## 2023-06-03 RX ORDER — ACETAMINOPHEN 160 MG
2 TABLET,DISINTEGRATING ORAL DAILY
Status: DISCONTINUED | OUTPATIENT
Start: 2023-06-04 | End: 2023-06-03

## 2023-06-03 RX ORDER — AMOXICILLIN 250 MG
2 CAPSULE ORAL 2 TIMES DAILY
Status: DISCONTINUED | OUTPATIENT
Start: 2023-06-03 | End: 2023-06-06 | Stop reason: HOSPADM

## 2023-06-03 RX ORDER — CHLORPHENIRAMINE MALEATE 4 MG/1
4 TABLET ORAL EVERY MORNING
Status: DISCONTINUED | OUTPATIENT
Start: 2023-06-04 | End: 2023-06-03

## 2023-06-03 RX ORDER — GUAIFENESIN 600 MG/1
600 TABLET, EXTENDED RELEASE ORAL EVERY 12 HOURS PRN
COMMUNITY
End: 2023-07-13

## 2023-06-03 RX ORDER — DOXYCYCLINE 100 MG/1
100 TABLET ORAL EVERY 12 HOURS
Status: DISCONTINUED | OUTPATIENT
Start: 2023-06-03 | End: 2023-06-03

## 2023-06-03 RX ORDER — OXYCODONE HYDROCHLORIDE 5 MG/1
2.5 TABLET ORAL
Status: DISCONTINUED | OUTPATIENT
Start: 2023-06-03 | End: 2023-06-06 | Stop reason: HOSPADM

## 2023-06-03 RX ORDER — ACETAMINOPHEN 325 MG/1
650 TABLET ORAL EVERY 6 HOURS PRN
Status: DISCONTINUED | OUTPATIENT
Start: 2023-06-03 | End: 2023-06-06 | Stop reason: HOSPADM

## 2023-06-03 RX ORDER — ALBUTEROL SULFATE 90 UG/1
2 AEROSOL, METERED RESPIRATORY (INHALATION)
Status: DISCONTINUED | OUTPATIENT
Start: 2023-06-03 | End: 2023-06-06 | Stop reason: HOSPADM

## 2023-06-03 RX ORDER — GABAPENTIN 100 MG/1
200 CAPSULE ORAL 2 TIMES DAILY
Status: DISCONTINUED | OUTPATIENT
Start: 2023-06-03 | End: 2023-06-06 | Stop reason: HOSPADM

## 2023-06-03 RX ORDER — OXYCODONE HYDROCHLORIDE 5 MG/1
5 TABLET ORAL
Status: DISCONTINUED | OUTPATIENT
Start: 2023-06-03 | End: 2023-06-06 | Stop reason: HOSPADM

## 2023-06-03 RX ORDER — OMEPRAZOLE 20 MG/1
40 CAPSULE, DELAYED RELEASE ORAL
Status: DISCONTINUED | OUTPATIENT
Start: 2023-06-03 | End: 2023-06-06 | Stop reason: HOSPADM

## 2023-06-03 RX ORDER — MORPHINE SULFATE 4 MG/ML
2 INJECTION INTRAVENOUS
Status: DISCONTINUED | OUTPATIENT
Start: 2023-06-03 | End: 2023-06-06 | Stop reason: HOSPADM

## 2023-06-03 RX ORDER — ZOLPIDEM TARTRATE 5 MG/1
5 TABLET ORAL
COMMUNITY
End: 2023-07-13 | Stop reason: SDUPTHER

## 2023-06-03 RX ORDER — LOSARTAN POTASSIUM 25 MG/1
50 TABLET ORAL DAILY
Status: DISCONTINUED | OUTPATIENT
Start: 2023-06-04 | End: 2023-06-06 | Stop reason: HOSPADM

## 2023-06-03 RX ORDER — HYDROCHLOROTHIAZIDE 25 MG/1
25 TABLET ORAL EVERY MORNING
COMMUNITY
End: 2023-10-16 | Stop reason: SDUPTHER

## 2023-06-03 RX ORDER — ONDANSETRON 4 MG/1
4 TABLET, ORALLY DISINTEGRATING ORAL EVERY 4 HOURS PRN
Status: DISCONTINUED | OUTPATIENT
Start: 2023-06-03 | End: 2023-06-06 | Stop reason: HOSPADM

## 2023-06-03 RX ORDER — METHYLPREDNISOLONE SODIUM SUCCINATE 40 MG/ML
20 INJECTION, POWDER, LYOPHILIZED, FOR SOLUTION INTRAMUSCULAR; INTRAVENOUS EVERY 6 HOURS
Status: DISCONTINUED | OUTPATIENT
Start: 2023-06-03 | End: 2023-06-06

## 2023-06-03 RX ORDER — GUAIFENESIN 600 MG/1
600 TABLET, EXTENDED RELEASE ORAL EVERY 12 HOURS PRN
Status: DISCONTINUED | OUTPATIENT
Start: 2023-06-03 | End: 2023-06-04

## 2023-06-03 RX ORDER — BISACODYL 10 MG
10 SUPPOSITORY, RECTAL RECTAL
Status: DISCONTINUED | OUTPATIENT
Start: 2023-06-03 | End: 2023-06-06 | Stop reason: HOSPADM

## 2023-06-03 RX ADMIN — ENOXAPARIN SODIUM 40 MG: 100 INJECTION SUBCUTANEOUS at 20:10

## 2023-06-03 RX ADMIN — GUAIFENESIN 600 MG: 600 TABLET, EXTENDED RELEASE ORAL at 21:00

## 2023-06-03 RX ADMIN — GABAPENTIN 200 MG: 100 CAPSULE ORAL at 21:00

## 2023-06-03 RX ADMIN — PREDNISONE 60 MG: 50 TABLET ORAL at 16:19

## 2023-06-03 RX ADMIN — LOVASTATIN 40 MG: 20 TABLET ORAL at 21:01

## 2023-06-03 RX ADMIN — IPRATROPIUM BROMIDE 0.5 MG: 0.5 SOLUTION RESPIRATORY (INHALATION) at 18:04

## 2023-06-03 RX ADMIN — ALBUTEROL SULFATE 2.5 MG: 2.5 SOLUTION RESPIRATORY (INHALATION) at 16:28

## 2023-06-03 RX ADMIN — METHYLPREDNISOLONE SODIUM SUCCINATE 20 MG: 40 INJECTION, POWDER, FOR SOLUTION INTRAMUSCULAR; INTRAVENOUS at 20:12

## 2023-06-03 RX ADMIN — SENNOSIDES AND DOCUSATE SODIUM 2 TABLET: 50; 8.6 TABLET ORAL at 21:01

## 2023-06-03 RX ADMIN — IPRATROPIUM BROMIDE 0.5 MG: 0.5 SOLUTION RESPIRATORY (INHALATION) at 16:28

## 2023-06-03 RX ADMIN — BENZOCAINE AND MENTHOL 1 LOZENGE: 15; 3.6 LOZENGE ORAL at 21:02

## 2023-06-03 RX ADMIN — OMEPRAZOLE 40 MG: 20 CAPSULE, DELAYED RELEASE ORAL at 21:01

## 2023-06-03 RX ADMIN — AMPICILLIN SODIUM AND SULBACTAM SODIUM 3 G: 2; 1 INJECTION, POWDER, FOR SOLUTION INTRAMUSCULAR; INTRAVENOUS at 17:19

## 2023-06-03 RX ADMIN — FLUTICASONE PROPIONATE 110 MCG: 110 AEROSOL, METERED RESPIRATORY (INHALATION) at 20:15

## 2023-06-03 RX ADMIN — ALBUTEROL SULFATE 2.5 MG: 2.5 SOLUTION RESPIRATORY (INHALATION) at 18:04

## 2023-06-03 RX ADMIN — ZOLPIDEM TARTRATE 5 MG: 5 TABLET ORAL at 21:02

## 2023-06-03 ASSESSMENT — COPD QUESTIONNAIRES
DURING THE PAST 4 WEEKS HOW MUCH DID YOU FEEL SHORT OF BREATH: NONE/LITTLE OF THE TIME
DO YOU EVER COUGH UP ANY MUCUS OR PHLEGM?: NO/ONLY WITH OCCASIONAL COLDS OR INFECTIONS
COPD SCREENING SCORE: 4
HAVE YOU SMOKED AT LEAST 100 CIGARETTES IN YOUR ENTIRE LIFE: YES

## 2023-06-03 ASSESSMENT — ENCOUNTER SYMPTOMS
HEADACHES: 0
DIZZINESS: 0
COUGH: 1
CONSTITUTIONAL NEGATIVE: 1
DOUBLE VISION: 0
NERVOUS/ANXIOUS: 0
NEUROLOGICAL NEGATIVE: 1
VOMITING: 0
FEVER: 0
CARDIOVASCULAR NEGATIVE: 1
LOSS OF CONSCIOUSNESS: 0
GASTROINTESTINAL NEGATIVE: 1
DIARRHEA: 0
ABDOMINAL PAIN: 0
WHEEZING: 0
PALPITATIONS: 0
PSYCHIATRIC NEGATIVE: 1
HEARTBURN: 0
SHORTNESS OF BREATH: 1
BLOOD IN STOOL: 0
EYES NEGATIVE: 1
HEMOPTYSIS: 0
DIAPHORESIS: 0
MUSCULOSKELETAL NEGATIVE: 1
CONSTIPATION: 0
NAUSEA: 0
FOCAL WEAKNESS: 0
BRUISES/BLEEDS EASILY: 0
CHILLS: 0
SEIZURES: 0

## 2023-06-03 ASSESSMENT — PAIN DESCRIPTION - PAIN TYPE: TYPE: ACUTE PAIN

## 2023-06-03 ASSESSMENT — FIBROSIS 4 INDEX
FIB4 SCORE: 2.45
FIB4 SCORE: 1.2

## 2023-06-03 ASSESSMENT — VISUAL ACUITY: OU: 1

## 2023-06-03 NOTE — ED TRIAGE NOTES
Pt presents with friend from home for cough and SOB. Did have exposure to friends who were ill. Pt RA saturation 86%. Pt placed on 2L/NC. Pt A&Ox4 and ambulatory however in wheelchair d/t SOB.

## 2023-06-03 NOTE — ED PROVIDER NOTES
"ED Provider Note    Scribed for Abdirashid Simmons M.D. by Rhoda Mancera. 6/3/2023  3:52 PM    Primary care provider: Belkis Vanegas P.A.-C.  Means of arrival: Wheelchair    CHIEF COMPLAINT  Chief Complaint   Patient presents with    Cough       LIMITATION TO HISTORY   None  HPI    Iveth Martin is a 89 y.o. female who presents to the Emergency Department complaining of a chronic cough onset one week ago. Per her , her recent cough episode started one week ago and the patient initially believed it was a \"sinus cold from the changing weather.\"   She has associated symptoms of shortness of breath, runny nose, vomiting, and nausea, but denies chest pain, fever, headache, body aches, diarrhea, abdominal pain, calf pain, or swelling. With the onset of her latest episode, the patient started using her inhaler and taking albuterol with little alleviation. She takes Flovent nightly but not Flonase currently. The patient also takes Hydrochlorothiazide for her edema.  The patient explains that she has a history of reactive airway disease and has had a deep cough since she was a child. The patient has a history of asthma, hypertension, and remote whooping cough. She reports having a history of smoking but quit in 1987. Denies a history of DVT, heart disease, congestive heart failure, or diabetes. She reports she was exposed to her friends who have similar symptoms.  She has had 3 or 4 recurrent respiratory infections since November.    OUTSIDE HISTORIAN(S):   who provides insight on the patient's medical history.     EXTERNAL RECORDS REVIEWED  The patient was seen April 14 in clinic for a chronic cough that has lasted for the last 4 months. She was diagnosed with asthma and given an inhaler steroid, albuterol, and nasal Flonase. She showed some improvement.     Inpatient nuclear medical stress test from February of 2022 shows no signs of ischemia or heart failure.    Records show that the patient was prescribed " Azithromycin in November but no other antibiotics since then.     PAST MEDICAL HISTORY  Past Medical History:   Diagnosis Date    Hyperlipidemia     Hypertension        FAMILY HISTORY  Family History   Problem Relation Age of Onset    Cancer Father     Heart Disease Father        SOCIAL HISTORY  Social History     Tobacco Use    Smoking status: Former     Packs/day: 1.00     Years: 30.00     Pack years: 30.00     Types: Cigarettes     Quit date:      Years since quittin.4    Smokeless tobacco: Never   Vaping Use    Vaping Use: Never used   Substance Use Topics    Alcohol use: Yes     Comment: rare    Drug use: No     Social History     Substance and Sexual Activity   Drug Use No       SURGICAL HISTORY  Past Surgical History:   Procedure Laterality Date    PB WRIST ARTHROSCOP,RELEASE XVERS LIG Left 2021    Procedure: LEFT ENDOSCOPIC CARPAL TUNNEL RELEASE;  Surgeon: Pee Khan M.D.;  Location: [facility];  Service: Orthopedics    PB INCISE FINGER TENDON SHEATH Left 2021    Procedure: LEFT LONG AND RING TRIGGER FINGER RELEASE;  Surgeon: Pee Khan M.D.;  Location: [facility];  Service: Orthopedics       CURRENT MEDICATIONS  No current facility-administered medications for this encounter.    Current Outpatient Medications:     hydroCHLOROthiazide (HYDRODIURIL) 25 MG Tab, TAKE 1 TABLET BY MOUTH EVERY DAY AS NEEDED, Disp: 90 Tablet, Rfl: 3    zolpidem (AMBIEN) 5 MG Tab, Take 1 Tablet by mouth at bedtime as needed for Sleep for up to 90 days., Disp: 90 Tablet, Rfl: 0    fluticasone (FLOVENT HFA) 110 MCG/ACT Aerosol, Inhale 1 Puff 2 times a day., Disp: 12 g, Rfl: 3    lovastatin (MEVACOR) 40 MG tablet, TAKE 1 TABLET BY MOUTH EVERY DAY IN THE EVENING, Disp: 100 Tablet, Rfl: 3    albuterol 108 (90 Base) MCG/ACT Aero Soln inhalation aerosol, INHALE 2 PUFFS BY INHALATION ROUTE EVERY 4-6 HRS AS NEEDED, Disp: 8.5 Each, Rfl: 0    methocarbamol (ROBAXIN)  "750 MG Tab, , Disp: , Rfl:     gabapentin (NEURONTIN) 100 MG Cap, Take 1 Capsule by mouth 3 times a day., Disp: , Rfl:     Cholecalciferol (VITAMIN D3) 2000 UNIT Cap, Take 2 Capsules by mouth every day., Disp: , Rfl:     chlorpheniramine (CHLOR-TRIMETRON) 4 MG Tab, Take 1 Tablet by mouth every day. Second dose in the evening PRN, Disp: , Rfl:     losartan (COZAAR) 50 MG Tab, Take 1 Tablet by mouth every day., Disp: , Rfl:     omeprazole (PRILOSEC) 40 MG delayed-release capsule, Take 1 Capsule by mouth 2 (two) times a day.  , Disp: , Rfl:     ALLERGIES  Allergies   Allergen Reactions    Celebrex [Celecoxib] Unspecified     GERD reaction    Lisinopril Cough    Nsaids Unspecified     GERD reaction    Other Drug Unspecified     bioxin    Clarithromycin Vomiting    Benadryl Allergy Unspecified     \"tongue gets fuzzy and feel like I am drugged\"       PHYSICAL EXAM  VITAL SIGNS: /82   Pulse (!) 107   Temp 36.2 °C (97.2 °F) (Temporal)   Resp (!) 26   Ht 1.613 m (5' 3.5\")   Wt 80.3 kg (177 lb)   SpO2 92%   BMI 30.86 kg/m²   Reviewed and tachycardic, tachypneic, hypoxic on room air to 86%.  Low normal oxygen on 2 L he is a cannula at 92%  Constitutional: Well developed, Well nourished.  HENT: Normocephalic, atraumatic, bilateral external ears normal, No intraoral erythema, edema, exudate  Eyes: PERRLA, conjunctiva pink, no scleral icterus.   Cardiovascular: Echocardiac, regular rate and rhythm. No murmurs, rubs or gallops.  No dependent edema or calf tenderness. No JVD  Respiratory:  Expiratory wheezing and rhonchi, No rales, Fair air  Abdominal:  Abdomen soft, non-tender, non distended. No rebound, or guarding.    Skin: No erythema, no rash. No wounds or bruising.  Musculoskeletal: no deformities. No edema  Neurologic: Alert & oriented x 3, cranial nerves 2-12 intact by passive exam.  No focal deficit noted.  Psychiatric: Affect normal, Judgment normal, Mood normal.     LABS Ordered and Reviewed by Me:  Results " for orders placed or performed during the hospital encounter of 06/03/23   Lactic acid (lactate)   Result Value Ref Range    Lactic Acid 1.0 0.5 - 2.0 mmol/L   CBC With Differential   Result Value Ref Range    WBC 14.2 (H) 4.8 - 10.8 K/uL    RBC 4.88 4.20 - 5.40 M/uL    Hemoglobin 14.2 12.0 - 16.0 g/dL    Hematocrit 42.0 37.0 - 47.0 %    MCV 86.1 81.4 - 97.8 fL    MCH 29.1 27.0 - 33.0 pg    MCHC 33.8 32.2 - 35.5 g/dL    RDW 44.5 35.9 - 50.0 fL    Platelet Count 294 164 - 446 K/uL    MPV 10.2 9.0 - 12.9 fL    Neutrophils-Polys 83.80 (H) 44.00 - 72.00 %    Lymphocytes 8.00 (L) 22.00 - 41.00 %    Monocytes 7.40 0.00 - 13.40 %    Eosinophils 0.10 0.00 - 6.90 %    Basophils 0.20 0.00 - 1.80 %    Immature Granulocytes 0.50 0.00 - 0.90 %    Nucleated RBC 0.00 0.00 - 0.20 /100 WBC    Neutrophils (Absolute) 11.88 (H) 1.82 - 7.42 K/uL    Lymphs (Absolute) 1.14 1.00 - 4.80 K/uL    Monos (Absolute) 1.05 (H) 0.00 - 0.85 K/uL    Eos (Absolute) 0.01 0.00 - 0.51 K/uL    Baso (Absolute) 0.03 0.00 - 0.12 K/uL    Immature Granulocytes (abs) 0.07 0.00 - 0.11 K/uL    NRBC (Absolute) 0.00 K/uL   Comp Metabolic Panel   Result Value Ref Range    Sodium 133 (L) 135 - 145 mmol/L    Potassium 3.4 (L) 3.6 - 5.5 mmol/L    Chloride 94 (L) 96 - 112 mmol/L    Co2 25 20 - 33 mmol/L    Anion Gap 14.0 7.0 - 16.0    Glucose 129 (H) 65 - 99 mg/dL    Bun 17 8 - 22 mg/dL    Creatinine 0.85 0.50 - 1.40 mg/dL    Calcium 9.1 8.4 - 10.2 mg/dL    AST(SGOT) 31 12 - 45 U/L    ALT(SGPT) 15 2 - 50 U/L    Alkaline Phosphatase 108 (H) 30 - 99 U/L    Total Bilirubin 1.0 0.1 - 1.5 mg/dL    Albumin 4.2 3.2 - 4.9 g/dL    Total Protein 6.5 6.0 - 8.2 g/dL    Globulin 2.3 1.9 - 3.5 g/dL    A-G Ratio 1.8 g/dL   CoV-2, FLU A/B, and RSV by PCR (2-4 Hours CEPHEID) : Collect NP swab in VTM    Specimen: Respirate   Result Value Ref Range    Influenza virus A RNA Negative Negative    Influenza virus B, PCR Negative Negative    RSV, PCR Negative Negative    SARS-CoV-2 by PCR  NotDetected     SARS-CoV-2 Source Nasal Swab    proBrain Natriuretic Peptide, NT   Result Value Ref Range    NT-proBNP 187 (H) 0 - 125 pg/mL   CORRECTED CALCIUM   Result Value Ref Range    Correct Calcium 8.9 8.5 - 10.5 mg/dL   ESTIMATED GFR   Result Value Ref Range    GFR (CKD-EPI) 65 >60 mL/min/1.73 m 2   PROCALCITONIN   Result Value Ref Range    Procalcitonin 0.09 <0.25 ng/mL         RADIOLOGY  I have independently interpreted the DX-Chest associated with this visit demonstrating no pneumonia.  I am awaiting the final reading from the radiologist.     Final Radiology Report  DX-CHEST-PORTABLE (1 VIEW)   Final Result      1.  There is no acute cardiopulmonary process.        Radiologist interpretation have been reviewed by me.       ED COURSE:  3:52 PM - Patient seen and examined at bedside.     INTERVENTIONS BY ME:  Medications   ampicillin/sulbactam (UNASYN) 3 g in  mL IVPB (has no administration in time range)   predniSONE (DELTASONE) tablet 60 mg (60 mg Oral Given 6/3/23 1619)   albuterol (PROVENTIL) 2.5mg/0.5ml nebulizer solution 2.5 mg (2.5 mg Nebulization Given 6/3/23 1628)   ipratropium (ATROVENT) 0.02 % nebulizer solution 0.5 mg (0.5 mg Nebulization Given 6/3/23 1628)       3:52 PM - On reassessment response to intervention patient was medicated with Unasyn 3mg, Deltasone 60mg, Proventil 2.5mg, and Atrovent 0.5mg.      4:38 PM - Jessica Hospitalist.     5:00 PM - Patient was reevaluated at bedside. Discussed lab and radiology results with the patient and informed them of my plan for hospitalization.      5:29 PM - Patient was reevaluated at bedside. I informed her that she tested negative for COVID-19 and the flu. She is feeling improved.     5:35 PM - Patient was reevaluated at bedside and informed her of my plan of hospitalization as she is still wheezing. The patient agrees to the plan of care. She is requesting another breathing treatment.     5:40 PM - Jessica Hospitalist.    5:49 PM - I discussed  the patient's case and the above findings with Dr. Beaver (Hospitalist) who agrees to hospitalization.     I have discussed management of the patient with the following physicians and sources:    Dr. Beaver (Hospitalist)      MEDICAL DECISION MAKING:  PROBLEMS EVALUATED THIS VISIT:    This patient presents with severe cough of a weeks duration and is found to have bronchospasm and probable asthma with respiratory failure and hypoxia.  There is no clear bacterial pneumonia COVID or influenza.  I am concerned she may have a chronic sinusitis triggering these flares and CT imaging is pending.  She will require admission for steroids bronchodilators oxygen and antibiotics.  She does technically meet criteria for sepsis.    RISK:  High given need for escalation of care to include admission     PLAN:    The patient will be admitted for oxygen, bronchodilation, steroids, and antibiotics.     CONDITION: Fair.     FINAL IMPRESSION  1. Acute bronchospasm    2. Hypoxia    3. Acute bacterial sinusitis       Rhoda KHAN (Scribe), am scribing for, and in the presence of, Abdirashid Simmons M.D..    Electronically signed by: Rhoda Mancera (Tieraibterence), 6/3/2023    Abdirashid KHAN M.D. personally performed the services described in this documentation, as scribed by Rhoda Mancera in my presence, and it is both accurate and complete.    The note accurately reflects work and decisions made by me.  Abdirashid Simmons M.D.  6/3/2023  8:37 PM

## 2023-06-04 LAB
ANION GAP SERPL CALC-SCNC: 14 MMOL/L (ref 7–16)
APPEARANCE UR: CLEAR
B PARAP IS1001 DNA NPH QL NAA+NON-PROBE: NOT DETECTED
B PERT.PT PRMT NPH QL NAA+NON-PROBE: NOT DETECTED
BILIRUB UR QL STRIP.AUTO: NEGATIVE
BUN SERPL-MCNC: 15 MG/DL (ref 8–22)
C PNEUM DNA NPH QL NAA+NON-PROBE: NOT DETECTED
CALCIUM SERPL-MCNC: 9 MG/DL (ref 8.4–10.2)
CHLORIDE SERPL-SCNC: 91 MMOL/L (ref 96–112)
CO2 SERPL-SCNC: 24 MMOL/L (ref 20–33)
COLOR UR: YELLOW
CREAT SERPL-MCNC: 0.76 MG/DL (ref 0.5–1.4)
ERYTHROCYTE [DISTWIDTH] IN BLOOD BY AUTOMATED COUNT: 45.2 FL (ref 35.9–50)
FLUAV RNA NPH QL NAA+NON-PROBE: NOT DETECTED
FLUBV RNA NPH QL NAA+NON-PROBE: NOT DETECTED
GFR SERPLBLD CREATININE-BSD FMLA CKD-EPI: 74 ML/MIN/1.73 M 2
GLUCOSE SERPL-MCNC: 170 MG/DL (ref 65–99)
GLUCOSE UR STRIP.AUTO-MCNC: NEGATIVE MG/DL
HADV DNA NPH QL NAA+NON-PROBE: NOT DETECTED
HCOV 229E RNA NPH QL NAA+NON-PROBE: NOT DETECTED
HCOV HKU1 RNA NPH QL NAA+NON-PROBE: NOT DETECTED
HCOV NL63 RNA NPH QL NAA+NON-PROBE: NOT DETECTED
HCOV OC43 RNA NPH QL NAA+NON-PROBE: NOT DETECTED
HCT VFR BLD AUTO: 39.9 % (ref 37–47)
HGB BLD-MCNC: 13.4 G/DL (ref 12–16)
HMPV RNA NPH QL NAA+NON-PROBE: DETECTED
HPIV1 RNA NPH QL NAA+NON-PROBE: NOT DETECTED
HPIV2 RNA NPH QL NAA+NON-PROBE: NOT DETECTED
HPIV3 RNA NPH QL NAA+NON-PROBE: NOT DETECTED
HPIV4 RNA NPH QL NAA+NON-PROBE: NOT DETECTED
KETONES UR STRIP.AUTO-MCNC: NEGATIVE MG/DL
LACTATE SERPL-SCNC: 0.9 MMOL/L (ref 0.5–2)
LEUKOCYTE ESTERASE UR QL STRIP.AUTO: NEGATIVE
M PNEUMO DNA NPH QL NAA+NON-PROBE: NOT DETECTED
MCH RBC QN AUTO: 29.2 PG (ref 27–33)
MCHC RBC AUTO-ENTMCNC: 33.6 G/DL (ref 32.2–35.5)
MCV RBC AUTO: 86.9 FL (ref 81.4–97.8)
MICRO URNS: NORMAL
NITRITE UR QL STRIP.AUTO: NEGATIVE
PH UR STRIP.AUTO: 5.5 [PH] (ref 5–8)
PLATELET # BLD AUTO: 255 K/UL (ref 164–446)
PMV BLD AUTO: 10.2 FL (ref 9–12.9)
POTASSIUM SERPL-SCNC: 3.1 MMOL/L (ref 3.6–5.5)
PROT UR QL STRIP: NEGATIVE MG/DL
RBC # BLD AUTO: 4.59 M/UL (ref 4.2–5.4)
RBC UR QL AUTO: NEGATIVE
RSV RNA NPH QL NAA+NON-PROBE: NOT DETECTED
RV+EV RNA NPH QL NAA+NON-PROBE: NOT DETECTED
SARS-COV-2 RNA NPH QL NAA+NON-PROBE: NOTDETECTED
SODIUM SERPL-SCNC: 129 MMOL/L (ref 135–145)
SP GR UR STRIP.AUTO: 1.02
WBC # BLD AUTO: 13.5 K/UL (ref 4.8–10.8)

## 2023-06-04 PROCEDURE — 97165 OT EVAL LOW COMPLEX 30 MIN: CPT

## 2023-06-04 PROCEDURE — A9270 NON-COVERED ITEM OR SERVICE: HCPCS | Performed by: HOSPITALIST

## 2023-06-04 PROCEDURE — A9270 NON-COVERED ITEM OR SERVICE: HCPCS | Performed by: INTERNAL MEDICINE

## 2023-06-04 PROCEDURE — 80048 BASIC METABOLIC PNL TOTAL CA: CPT

## 2023-06-04 PROCEDURE — 83605 ASSAY OF LACTIC ACID: CPT

## 2023-06-04 PROCEDURE — 36415 COLL VENOUS BLD VENIPUNCTURE: CPT

## 2023-06-04 PROCEDURE — 94640 AIRWAY INHALATION TREATMENT: CPT

## 2023-06-04 PROCEDURE — 99232 SBSQ HOSP IP/OBS MODERATE 35: CPT | Performed by: INTERNAL MEDICINE

## 2023-06-04 PROCEDURE — 700102 HCHG RX REV CODE 250 W/ 637 OVERRIDE(OP): Performed by: INTERNAL MEDICINE

## 2023-06-04 PROCEDURE — 97535 SELF CARE MNGMENT TRAINING: CPT

## 2023-06-04 PROCEDURE — 700111 HCHG RX REV CODE 636 W/ 250 OVERRIDE (IP): Performed by: HOSPITALIST

## 2023-06-04 PROCEDURE — 85027 COMPLETE CBC AUTOMATED: CPT

## 2023-06-04 PROCEDURE — 94760 N-INVAS EAR/PLS OXIMETRY 1: CPT

## 2023-06-04 PROCEDURE — 770001 HCHG ROOM/CARE - MED/SURG/GYN PRIV*

## 2023-06-04 PROCEDURE — 81003 URINALYSIS AUTO W/O SCOPE: CPT

## 2023-06-04 PROCEDURE — 87086 URINE CULTURE/COLONY COUNT: CPT

## 2023-06-04 PROCEDURE — 700102 HCHG RX REV CODE 250 W/ 637 OVERRIDE(OP): Performed by: HOSPITALIST

## 2023-06-04 PROCEDURE — 97162 PT EVAL MOD COMPLEX 30 MIN: CPT

## 2023-06-04 RX ORDER — GUAIFENESIN 600 MG/1
1200 TABLET, EXTENDED RELEASE ORAL EVERY 12 HOURS PRN
Status: DISCONTINUED | OUTPATIENT
Start: 2023-06-04 | End: 2023-06-06 | Stop reason: HOSPADM

## 2023-06-04 RX ORDER — POTASSIUM CHLORIDE 20 MEQ/1
40 TABLET, EXTENDED RELEASE ORAL ONCE
Status: COMPLETED | OUTPATIENT
Start: 2023-06-04 | End: 2023-06-04

## 2023-06-04 RX ADMIN — GUAIFENESIN 1200 MG: 600 TABLET, EXTENDED RELEASE ORAL at 22:11

## 2023-06-04 RX ADMIN — LOSARTAN POTASSIUM 50 MG: 25 TABLET, FILM COATED ORAL at 05:58

## 2023-06-04 RX ADMIN — GABAPENTIN 200 MG: 100 CAPSULE ORAL at 17:09

## 2023-06-04 RX ADMIN — METHYLPREDNISOLONE SODIUM SUCCINATE 20 MG: 40 INJECTION, POWDER, FOR SOLUTION INTRAMUSCULAR; INTRAVENOUS at 07:03

## 2023-06-04 RX ADMIN — ENOXAPARIN SODIUM 40 MG: 100 INJECTION SUBCUTANEOUS at 17:10

## 2023-06-04 RX ADMIN — FLUTICASONE PROPIONATE 110 MCG: 110 AEROSOL, METERED RESPIRATORY (INHALATION) at 05:58

## 2023-06-04 RX ADMIN — BENZOCAINE AND MENTHOL 1 LOZENGE: 15; 3.6 LOZENGE ORAL at 20:52

## 2023-06-04 RX ADMIN — OMEPRAZOLE 40 MG: 20 CAPSULE, DELAYED RELEASE ORAL at 20:41

## 2023-06-04 RX ADMIN — OMEPRAZOLE 40 MG: 20 CAPSULE, DELAYED RELEASE ORAL at 08:06

## 2023-06-04 RX ADMIN — POTASSIUM CHLORIDE 40 MEQ: 1500 TABLET, EXTENDED RELEASE ORAL at 08:06

## 2023-06-04 RX ADMIN — METHYLPREDNISOLONE SODIUM SUCCINATE 20 MG: 40 INJECTION, POWDER, FOR SOLUTION INTRAMUSCULAR; INTRAVENOUS at 01:57

## 2023-06-04 RX ADMIN — LOVASTATIN 40 MG: 20 TABLET ORAL at 17:09

## 2023-06-04 RX ADMIN — GABAPENTIN 200 MG: 100 CAPSULE ORAL at 05:58

## 2023-06-04 RX ADMIN — METHYLPREDNISOLONE SODIUM SUCCINATE 20 MG: 40 INJECTION, POWDER, FOR SOLUTION INTRAMUSCULAR; INTRAVENOUS at 11:49

## 2023-06-04 RX ADMIN — ZOLPIDEM TARTRATE 5 MG: 5 TABLET ORAL at 20:41

## 2023-06-04 RX ADMIN — SENNOSIDES AND DOCUSATE SODIUM 2 TABLET: 50; 8.6 TABLET ORAL at 05:58

## 2023-06-04 RX ADMIN — ALBUTEROL SULFATE 2 PUFF: 90 AEROSOL, METERED RESPIRATORY (INHALATION) at 22:30

## 2023-06-04 RX ADMIN — FLUTICASONE PROPIONATE 110 MCG: 110 AEROSOL, METERED RESPIRATORY (INHALATION) at 19:32

## 2023-06-04 RX ADMIN — METHYLPREDNISOLONE SODIUM SUCCINATE 20 MG: 40 INJECTION, POWDER, FOR SOLUTION INTRAMUSCULAR; INTRAVENOUS at 17:10

## 2023-06-04 ASSESSMENT — LIFESTYLE VARIABLES
HOW MANY TIMES IN THE PAST YEAR HAVE YOU HAD 5 OR MORE DRINKS IN A DAY: 0
CONSUMPTION TOTAL: NEGATIVE
HAVE YOU EVER FELT YOU SHOULD CUT DOWN ON YOUR DRINKING: NO
AVERAGE NUMBER OF DAYS PER WEEK YOU HAVE A DRINK CONTAINING ALCOHOL: 0
TOTAL SCORE: 0
EVER FELT BAD OR GUILTY ABOUT YOUR DRINKING: NO
ON A TYPICAL DAY WHEN YOU DRINK ALCOHOL HOW MANY DRINKS DO YOU HAVE: 0
ALCOHOL_USE: NO
HAVE PEOPLE ANNOYED YOU BY CRITICIZING YOUR DRINKING: NO
TOTAL SCORE: 0
TOTAL SCORE: 0
EVER HAD A DRINK FIRST THING IN THE MORNING TO STEADY YOUR NERVES TO GET RID OF A HANGOVER: NO

## 2023-06-04 ASSESSMENT — COGNITIVE AND FUNCTIONAL STATUS - GENERAL
TOILETING: A LITTLE
WALKING IN HOSPITAL ROOM: A LITTLE
DAILY ACTIVITIY SCORE: 22
MOVING FROM LYING ON BACK TO SITTING ON SIDE OF FLAT BED: A LITTLE
STANDING UP FROM CHAIR USING ARMS: A LITTLE
TOILETING: A LITTLE
MOBILITY SCORE: 17
SUGGESTED CMS G CODE MODIFIER MOBILITY: CJ
TURNING FROM BACK TO SIDE WHILE IN FLAT BAD: A LITTLE
CLIMB 3 TO 5 STEPS WITH RAILING: A LITTLE
DRESSING REGULAR LOWER BODY CLOTHING: A LITTLE
DRESSING REGULAR LOWER BODY CLOTHING: A LITTLE
SUGGESTED CMS G CODE MODIFIER DAILY ACTIVITY: CJ
SUGGESTED CMS G CODE MODIFIER MOBILITY: CK
MOVING TO AND FROM BED TO CHAIR: A LITTLE
DAILY ACTIVITIY SCORE: 24
STANDING UP FROM CHAIR USING ARMS: A LITTLE
CLIMB 3 TO 5 STEPS WITH RAILING: A LOT
WALKING IN HOSPITAL ROOM: A LITTLE
DAILY ACTIVITIY SCORE: 21
HELP NEEDED FOR BATHING: A LITTLE
SUGGESTED CMS G CODE MODIFIER DAILY ACTIVITY: CH
WALKING IN HOSPITAL ROOM: A LITTLE
SUGGESTED CMS G CODE MODIFIER MOBILITY: CJ
MOBILITY SCORE: 21
STANDING UP FROM CHAIR USING ARMS: A LITTLE
MOBILITY SCORE: 21
CLIMB 3 TO 5 STEPS WITH RAILING: A LITTLE
SUGGESTED CMS G CODE MODIFIER DAILY ACTIVITY: CJ

## 2023-06-04 ASSESSMENT — ENCOUNTER SYMPTOMS
DIZZINESS: 0
WEAKNESS: 0
MYALGIAS: 0
INSOMNIA: 0
NERVOUS/ANXIOUS: 0
CLAUDICATION: 0
HEARTBURN: 0
ABDOMINAL PAIN: 0
PHOTOPHOBIA: 0
DIARRHEA: 0
DEPRESSION: 0
BLURRED VISION: 0
CONSTIPATION: 0
SPUTUM PRODUCTION: 1
VOMITING: 0
CHILLS: 0
FEVER: 0
COUGH: 1
HEADACHES: 0
SHORTNESS OF BREATH: 1
SENSORY CHANGE: 0
SPEECH CHANGE: 0

## 2023-06-04 ASSESSMENT — FIBROSIS 4 INDEX: FIB4 SCORE: 2.82

## 2023-06-04 ASSESSMENT — ACTIVITIES OF DAILY LIVING (ADL): TOILETING: INDEPENDENT

## 2023-06-04 ASSESSMENT — GAIT ASSESSMENTS
DISTANCE (FEET): 100
DEVIATION: DECREASED HEEL STRIKE;DECREASED TOE OFF
GAIT LEVEL OF ASSIST: SUPERVISED

## 2023-06-04 ASSESSMENT — PAIN DESCRIPTION - PAIN TYPE
TYPE: ACUTE PAIN
TYPE: ACUTE PAIN

## 2023-06-04 NOTE — ASSESSMENT & PLAN NOTE
Likely due to asthma exacerbation  Reported possible asthma history and has been on albuterol inhaler, Flovent  Remote smoker, denies a history of COPD.  She stated that she had PFT recently which was normal    Patient is having significant wheezing today  Start scheduled nebulizer with DuoNebs every 4 hours ATC  Continue need steroids and nebulizer treatments.    COVID, influenza, RSV negative   Procalcitonin normal at 0.09   No need for antibiotics at this time

## 2023-06-04 NOTE — ED NOTES
Pharmacy Medication Reconciliation      ~Medication reconciliation updated and complete per patient at bedside & patient home pharmacy   ~Allergies have been verified and updated   ~No oral ABX within the last 30 days  ~Patient home pharmacy :  [facility]

## 2023-06-04 NOTE — THERAPY
Physical Therapy   Initial Evaluation     Patient Name: Iveth Martin  Age:  89 y.o., Sex:  female  Medical Record #: [id number]  Today's Date: 6/4/2023          Assessment  Patient is 89 y.o. female with a diagnosis of acute respiratory failure with hypoxia. Pt has chronic reactive airway desease and a PMH of hyperlipidemia, HPTN, chronic kidney disease stage 3 and obesity.  Additional factors influencing patient status / progress include her need for 02 support , steroid and nebulizer treatments pt is nearing her baseline but continues to have coughing attacks which last 30 secs to 1 miute which are not productive.  Pt is able to mobilize safely in her room to bathroom with extended O 2 tubing. She will benefit from 1 more PT to work on gait endurance to 150/200 ' and for stair training as pt is a  and her pianos are in her basement/lower floor. Pt's 02 sats are 86% following gait today and return to 88% and above with 1 minute rest following activity.       Plan    Physical Therapy Initial Treatment Plan   Treatment Plan : Neuro Re-Education / Balance, Self Care / Home Evaluation, Stair Training, Therapeutic Exercise  Treatment Frequency: Other (See Comments)  Duration: 2 Visits    DC Equipment Recommendations: Grab Bar(s) by Toilet  Discharge Recommendations: Recommend home health for continued physical therapy services       Subjective  Pt is young appearing 89 y/o and is willing to work with PT today. She is hoping her cough stops as this wears her out more than anything else.      Objective     06/04/23 1050   Prior Living Situation   Prior Services Housekeeping / Homemaker Services  (pt has a neighbor she pays to help her with  housekeeping)   Housing / Facility Other (Comments)  (pt lives in a 2 story duplex and her pianos are on the lower floor.)   Steps Into Home 0   Steps In Home 13   Rail Both Rail (Steps in Home)   Bathroom Set up Bathtub / Shower Combination;Grab Bars   Equipment Owned  "Grab Bar(s) In Tub / Shower   Lives with - Patient's Self Care Capacity Alone and Able to Care For Self   Comments pt will benefit from bars by toilet   Prior Level of Functional Mobility   Bed Mobility Independent   Transfer Status Independent   Ambulation Independent   Ambulation Distance   (community)   Assistive Devices Used None   Stairs Independent   Comments pt has never used an AD for mobility. pt states she keeps her walks short due to her LBP.   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   Functional Mobility   Sit to Stand Supervised   Bed, Chair, Wheelchair Transfer Supervised   Toilet Transfers Supervised   Transfer Method Stand Step   Mobility supine sit<>std, gait in room, hallway, back to seated bedside in chair.   Comments pt appears to be near baseline with functional mobility except for stair climbing.   Patient / Family Goals    Patient / Family Goal #1 return home   Short Term Goals    Short Term Goal # 1 pt will bet Out of bed and return BTB with Mod I in 2 visits.   Short Term Goal # 2 pt will transfer without an AD and with Mod I in 2 visits   Short Term Goal # 3 pt will ambulate in the hallway 150' with  1L or no O2 and Mod I in 2 visits.   Short Term Goal # 4 pt will use handout to work on her balance skills at home. ( 2 visits)   Anticipated Discharge Equipment and Recommendations   DC Equipment Recommendations Grab Bar(s) by Toilet   Discharge Recommendations Recommend home health for continued physical therapy services             "

## 2023-06-04 NOTE — DISCHARGE PLANNING
Received choice form @: 2915  Agency/Facility name: [facility]  Sent referral per choice form @: No referral sent. Pending orders.

## 2023-06-04 NOTE — CARE PLAN
The patient is Stable - Low risk of patient condition declining or worsening    Shift Goals  Clinical Goals: Pt will wean off O2 this shift  Patient Goals: Rest this shift    Progress made toward(s) clinical / shift goals:    Pt rested this shift    Patient is not progressing towards the following goals:    Pt is on 1L  O2 at this time.

## 2023-06-04 NOTE — ASSESSMENT & PLAN NOTE
Body mass index is 30.86 kg/m².  Outpatient weight loss management program and lifestyle modification highly recommended

## 2023-06-04 NOTE — ED NOTES
Pt resting on gurney, pt in no acute distress, pt provided call light, instructed to call if needing any assistance, instructed not to get up by self, drerghanshyam in lowest position.   Sp02 93% on 2L NC  Pt reports she feels like she can not fully exhale  Pt has a congested dry cough  Friend at bedside

## 2023-06-04 NOTE — THERAPY
Occupational Therapy   Initial Evaluation     Patient Name: Iveth Martin  Age:  89 y.o., Sex:  female  Medical Record #: [id number]  Today's Date: 6/4/2023     Assessment  Patient is 89 y.o. female with a diagnosis of acute respiratory failure with hypoxia. Admitted with cough x 1week. Pt is A&Ox4, O2 @1L NC in place. O2 sats lower to 87% with RA trial. Education on role of OT, DME use, energy conservation tech's and home safety during ADL's. Performs ADL transfers with Spv, no AD; shows good static/dynamic balance. Performs toileting, standing sponge bath, grooming, LB dressing with SBA/Spv. Appears near baseline level, except she does not wear O2 at home. Lives alone, large support system. No further acute OT needs.        Plan  Eval only.      DC Equipment Recommendations: (P) None  Discharge Recommendations: (P) Anticipate that the patient will have no further occupational therapy needs after discharge from the hospital     Subjective  Pleasant and cooperative     Objective     06/04/23 1232   Prior Living Situation   Prior Services Housekeeping / Homemaker Services   Housing / Facility 2 Story House  (basement is whwere pt plays piano; states she does not use stairs every day)   Rail Both Rail (Steps in Home)   Bathroom Set up Bathtub / Shower Combination;Grab Bars   Equipment Owned Grab Bar(s) In Tub / Shower   Lives with - Patient's Self Care Capacity Alone and Able to Care For Self   Comments Large support system of friends/neighbors   Prior Level of ADL Function   Self Feeding Independent   Grooming / Hygiene Independent   Bathing Independent   Dressing Independent   Toileting Independent   Prior Level of IADL Function   Medication Management Independent   Laundry Independent   Kitchen Mobility Independent   Finances Independent   Home Management Independent   Shopping Independent   Prior Level Of Mobility Independent Without Device in Home   Driving / Transportation Driving Independent   Occupation  "(Pre-Hospital Vocational) Retired Due To Age   Leisure Interests Hobbies   History of Falls   History of Falls No   Vitals   O2 (LPM) 1   O2 Delivery Device Nasal Cannula   Cognition    Cognition / Consciousness WDL   Comments Ox4   Passive ROM Upper Body   Passive ROM Upper Body WDL   Active ROM Upper Body   Active ROM Upper Body  WDL   Sensation Upper Body   Upper Extremity Sensation  WDL   Coordination Upper Body   Coordination WDL   Balance Assessment   Sitting Balance (Static) Good   Sitting Balance (Dynamic) Good   Standing Balance (Static) Good   Standing Balance (Dynamic) Good   Weight Shift Sitting Good   Weight Shift Standing Good   Bed Mobility    Supine to Sit Independent   Sit to Supine Independent   Scooting Independent   ADL Assessment   Eating Independent   Grooming Supervision;Standing   Bathing Supervision   Upper Body Dressing Independent   Lower Body Dressing Modified Independent   Toileting Independent   How much help from another person does the patient currently need...   Putting on and taking off regular lower body clothing? 4   Bathing (including washing, rinsing, and drying)? 4   Toileting, which includes using a toilet, bedpan, or urinal? 4   Putting on and taking off regular upper body clothing? 4   Taking care of personal grooming such as brushing teeth? 4   Eating meals? 4   6 Clicks Daily Activity Score 24   Functional Mobility   Sit to Stand Independent   Bed, Chair, Wheelchair Transfer Supervised   Toilet Transfers Supervised   Transfer Method Stand Step   Visual Perception   Visual Perception  WDL   Activity Tolerance   Sitting in Chair >1 hr   Sitting Edge of Bed 8   Standing >15\"   Education Group   Education Provided Home Safety   Role of Occupational Therapist Patient Response Patient;Acceptance;Explanation;Verbal Demonstration   Home Safety Patient Response Patient;Acceptance;Verbal Demonstration   Anticipated Discharge Equipment and Recommendations   DC Equipment " Recommendations None   Discharge Recommendations Anticipate that the patient will have no further occupational therapy needs after discharge from the hospital   Interdisciplinary Plan of Care Collaboration   IDT Collaboration with  Nursing   Patient Position at End of Therapy Seated;Call Light within Reach;Tray Table within Reach;Phone within Reach   Collaboration Comments aware of visit. pt's O2 sats @87% when on RA.

## 2023-06-04 NOTE — PROGRESS NOTES
4 Eyes Skin Assessment Completed by JOÃO Thomas and JOÃO Junior.    Head WDL  Ears WDL  Nose WDL  Mouth WDL  Neck WDL  Breast/Chest WDL  Shoulder Blades WDL  Spine Upper back Redness and Blanching  (R) Arm/Elbow/Hand WDL  (L) Arm/Elbow/Hand WDL  Abdomen WDL  Groin WDL  Scrotum/Coccyx/Buttocks WDL  (R) Leg WDL  (L) Leg WDL  (R) Heel/Foot/Toe WDL  (L) Heel/Foot/Toe WDL          Devices In Places Pulse Ox and Nasal Cannula      Interventions In Place Gray Ear Foams and NC W/Ear Foams    Possible Skin Injury No    Pictures Uploaded Into Epic N/A  Wound Consult Placed N/A  RN Wound Prevention Protocol Ordered No

## 2023-06-04 NOTE — CARE PLAN
"The patient is Stable - Low risk of patient condition declining or worsening    Shift Goals  Clinical Goals: Pts spO2 will be maintained above 90% throughout this shift  Patient Goals: \"Something for my throat\"    Progress made toward(s) clinical / shift goals:  SpO2 maintained above 90% this shift. Pt weaned to 1L of O2 via NC. PRN throat lozenge given 1x for throat discomfort.    Patient is not progressing towards the following goals: n/a      "

## 2023-06-04 NOTE — PROGRESS NOTES
Pt arrived via gurney, admitted to room 122 from ED.Pt is A&Ox4, Oriented to room call light and smoking policy. Reviewed plan of care with the patient and the family. Fall precaution in place. Bed locked. Bet at lowest position. Call light within reach. Encouraged pt the importance to call for assistance. Continue to monitor. Hourly rounding in progress

## 2023-06-04 NOTE — HOSPITAL COURSE
Patient is a 89-year-old female who presented with shortness of breath.  She has issues with reactive airway disease and earlier this week started having increasing shortness of breath and cough.  As the week progressed she continued to worsen to where she decided to come to the emergency room for further evaluation.  Patient with coughing fits with no evidence of wheezing on examination.  COVID, RSV and influenza are negative.  Human metapneumovirus positive, likely the etiology of her cough.  We will continue with steroids and supportive care and wean from oxygen as able.

## 2023-06-04 NOTE — H&P
Hospital Medicine History & Physical Note    Date of Service  6/3/2023    Primary Care Physician  Belkis Vanegas P.A.-C.    Consultants  None    Specialist Names: None    Code Status  DNAR/DNI    Chief Complaint  Chief Complaint   Patient presents with    Cough       History of Presenting Illness  Iveth Martin is a 89 y.o. female who presented 6/3/2023 with shortness of breath.  Patient apparently has chronic reactive airway disease.  She says on Monday of this week she started to have shortness of breath that was mild and she started to have coughing.  As the week progressed every day she got worse to the point where today she had to come into the emergency room for evaluation.  The patient continues to have coughing and she has diffuse crackles and rhonchi throughout the lung fields.  She is working hard and breathing hard.  The patient at this point will need steroids as well as nebulizer treatments and oxygen support.  Her initial COVID-19 and RSV and influenza screen are negative.  I will do a respiratory viral panel by PCR on her.  We will obtain a procalcitonin level to ensure that the patient does not have bacterial infection.  Cultures have also been sent off.  At this point patient wishes to be DO NOT RESUSCITATE which I discussed with her.    I discussed the plan of care with patient, family, bedside RN, pharmacy, and emergency room physician .    Review of Systems  Review of Systems   Constitutional: Negative.  Negative for chills, diaphoresis and fever.   HENT: Negative.     Eyes: Negative.  Negative for double vision.   Respiratory:  Positive for cough and shortness of breath. Negative for hemoptysis and wheezing.    Cardiovascular: Negative.  Negative for chest pain, palpitations and leg swelling.   Gastrointestinal: Negative.  Negative for abdominal pain, blood in stool, constipation, diarrhea, heartburn, nausea and vomiting.   Genitourinary: Negative.  Negative for frequency, hematuria and  "urgency.   Musculoskeletal: Negative.  Negative for joint pain.   Skin: Negative.  Negative for itching and rash.   Neurological: Negative.  Negative for dizziness, focal weakness, seizures, loss of consciousness and headaches.   Endo/Heme/Allergies: Negative.  Does not bruise/bleed easily.   Psychiatric/Behavioral: Negative.  Negative for suicidal ideas. The patient is not nervous/anxious.    All other systems reviewed and are negative.      Past Medical History   has a past medical history of Hyperlipidemia and Hypertension.    Surgical History   has a past surgical history that includes pr wrist arthroscop,release xvers lig (Left, 8/11/2021) and pb incise finger tendon sheath (Left, 8/11/2021).     Family History  family history includes Cancer in her father; Heart Disease in her father.   Family history reviewed with patient. There is no family history that is pertinent to the chief complaint.     Social History   reports that she quit smoking about 37 years ago. Her smoking use included cigarettes. She has a 30.00 pack-year smoking history. She has never used smokeless tobacco. She reports current alcohol use. She reports that she does not use drugs.    Allergies  Allergies   Allergen Reactions    Celecoxib Unspecified     GERD reaction    Lisinopril Cough    Nsaids Unspecified     GERD reaction    Other Drug Unspecified     bioxin    Benadryl Allergy Unspecified     \"tongue gets fuzzy and feel like I am drugged\"    Clarithromycin Vomiting       Medications  Prior to Admission Medications   Prescriptions Last Dose Informant Patient Reported? Taking?   Cholecalciferol (VITAMIN D3) 2000 UNIT Cap 6/3/2023 at 0630 Patient Yes No   Sig: Take 2 Capsules by mouth every day.   albuterol 108 (90 Base) MCG/ACT Aero Soln inhalation aerosol 6/2/2023 at PRN Patient No No   Sig: INHALE 2 PUFFS BY INHALATION ROUTE EVERY 4-6 HRS AS NEEDED   chlorpheniramine (CHLOR-TRIMETRON) 4 MG Tab 6/3/2023 at 0630 Patient Yes No   Sig: Take " 4 mg by mouth every morning. Second dose in the evening PRN   fluticasone (FLOVENT HFA) 110 MCG/ACT Aerosol 6/3/2023 at 0630 Patient No No   Sig: Inhale 1 Puff 2 times a day.   gabapentin (NEURONTIN) 100 MG Cap 6/3/2023 at 0630 Patient Yes No   Sig: Take 200 mg by mouth 2 times a day.   guaiFENesin (ROBITUSSIN) 100 MG/5ML liquid 6/3/2023 at 0630 Patient Yes Yes   Sig: Take 10 mL by mouth every 6 hours as needed for Cough.   guaiFENesin ER (MUCINEX) 600 MG TABLET SR 12 HR 6/2/2023 at PRN Patient Yes Yes   Sig: Take 600 mg by mouth every 12 hours as needed for Cough.   hydroCHLOROthiazide (HYDRODIURIL) 25 MG Tab 6/3/2023 at 0630 Patient Yes Yes   Sig: Take 25 mg by mouth every morning.   losartan (COZAAR) 50 MG Tab 6/3/2023 at 0630 Patient Yes No   Sig: Take 1 Tablet by mouth every day.   lovastatin (MEVACOR) 40 MG tablet 6/2/2023 at PM Patient No No   Sig: TAKE 1 TABLET BY MOUTH EVERY DAY IN THE EVENING   methocarbamol (ROBAXIN) 750 MG Tab 6/2/2023 at HS Patient Yes No   Sig: Take 750 mg by mouth at bedtime as needed (Pain).   omeprazole (PRILOSEC) 40 MG delayed-release capsule 6/3/2023 at 0630 Patient Yes No   Sig: Take 1 Capsule by mouth 2 (two) times a day.     zolpidem (AMBIEN) 5 MG Tab 6/2/2023 at HS Patient Yes Yes   Sig: Take 5 mg by mouth at bedtime.      Facility-Administered Medications: None       Physical Exam  Temp:  [36.2 °C (97.2 °F)] 36.2 °C (97.2 °F)  Pulse:  [] 98  Resp:  [15-26] 24  BP: (107-141)/(61-82) 120/82  SpO2:  [87 %-97 %] 92 %  Blood Pressure : 120/82   Temperature: 36.2 °C (97.2 °F)   Pulse: 98   Respiration: (!) 24   Pulse Oximetry: 92 %       Physical Exam  Vitals and nursing note reviewed. Exam conducted with a chaperone present.   Constitutional:       General: She is awake.      Appearance: She is well-developed and well-groomed. She is obese. She is ill-appearing.   HENT:      Head: Normocephalic and atraumatic.      Jaw: There is normal jaw occlusion. No trismus.       Salivary Glands: Right salivary gland is not tender. Left salivary gland is not tender.      Right Ear: External ear normal.      Left Ear: External ear normal.      Nose: Nose normal.      Mouth/Throat:      Mouth: Mucous membranes are moist.      Pharynx: Oropharynx is clear.   Eyes:      General: Lids are normal. Vision grossly intact.      Extraocular Movements: Extraocular movements intact.      Conjunctiva/sclera: Conjunctivae normal.      Right eye: Right conjunctiva is not injected. No exudate.     Left eye: Left conjunctiva is not injected. No exudate.     Pupils: Pupils are equal, round, and reactive to light.   Neck:      Thyroid: No thyroid mass.      Vascular: No hepatojugular reflux or JVD.      Trachea: No abnormal tracheal secretions or tracheal deviation.   Cardiovascular:      Rate and Rhythm: Normal rate and regular rhythm. Occasional Extrasystoles are present.     Pulses: Normal pulses.      Heart sounds: Normal heart sounds. No murmur heard.     No friction rub.   Pulmonary:      Effort: Tachypnea and accessory muscle usage present.      Breath sounds: Decreased air movement present. Examination of the right-upper field reveals decreased breath sounds and rhonchi. Examination of the left-upper field reveals decreased breath sounds and rhonchi. Examination of the right-middle field reveals decreased breath sounds and rhonchi. Examination of the left-middle field reveals decreased breath sounds and rhonchi. Examination of the right-lower field reveals decreased breath sounds and rhonchi. Examination of the left-lower field reveals decreased breath sounds and rhonchi. Decreased breath sounds and rhonchi present. No wheezing.   Abdominal:      General: Abdomen is flat.      Palpations: Abdomen is soft.      Tenderness: There is no abdominal tenderness. There is no right CVA tenderness or left CVA tenderness.      Hernia: No hernia is present.   Musculoskeletal:      Cervical back: Full passive range  of motion without pain, normal range of motion and neck supple. No rigidity. No muscular tenderness.      Right lower leg: No edema.      Left lower leg: No edema.   Lymphadenopathy:      Head:      Right side of head: No submental adenopathy.      Left side of head: No submental adenopathy.      Cervical:      Right cervical: No superficial cervical adenopathy.     Left cervical: No superficial cervical adenopathy.      Upper Body:      Right upper body: No supraclavicular adenopathy.      Left upper body: No supraclavicular adenopathy.   Skin:     General: Skin is warm and dry.      Capillary Refill: Capillary refill takes less than 2 seconds.      Coloration: Skin is not cyanotic or pale.      Findings: No abrasion or bruising.   Neurological:      General: No focal deficit present.      Mental Status: She is alert and oriented to person, place, and time. Mental status is at baseline.      GCS: GCS eye subscore is 4. GCS verbal subscore is 5. GCS motor subscore is 6.      Cranial Nerves: No cranial nerve deficit.      Sensory: No sensory deficit.      Motor: Motor function is intact.      Deep Tendon Reflexes:      Reflex Scores:       Tricep reflexes are 2+ on the right side and 2+ on the left side.       Bicep reflexes are 2+ on the right side and 2+ on the left side.       Brachioradialis reflexes are 2+ on the right side and 2+ on the left side.       Patellar reflexes are 2+ on the right side and 2+ on the left side.       Achilles reflexes are 2+ on the right side and 2+ on the left side.  Psychiatric:         Attention and Perception: Attention and perception normal.         Mood and Affect: Mood normal.         Speech: Speech normal.         Behavior: Behavior normal. Behavior is cooperative.         Thought Content: Thought content normal.         Cognition and Memory: Cognition and memory normal.         Judgment: Judgment normal.         Laboratory:  Recent Labs     06/03/23  1547   WBC 14.2*   RBC  4.88   HEMOGLOBIN 14.2   HEMATOCRIT 42.0   MCV 86.1   MCH 29.1   MCHC 33.8   RDW 44.5   PLATELETCT 294   MPV 10.2     Recent Labs     06/03/23  1547   SODIUM 133*   POTASSIUM 3.4*   CHLORIDE 94*   CO2 25   GLUCOSE 129*   BUN 17   CREATININE 0.85   CALCIUM 9.1     Recent Labs     06/03/23  1547   ALTSGPT 15   ASTSGOT 31   ALKPHOSPHAT 108*   TBILIRUBIN 1.0   GLUCOSE 129*         Recent Labs     06/03/23  1547   NTPROBNP 187*         No results for input(s): TROPONINT in the last 72 hours.    Imaging:  DX-CHEST-PORTABLE (1 VIEW)   Final Result      1.  There is no acute cardiopulmonary process.      CT-MAXILLOFACIAL W/O PLUS RECONS    (Results Pending)       X-Ray:  I have personally reviewed the images and compared with prior images.  EKG:  I have personally reviewed the images and compared with prior images.    Assessment/Plan:  Justification for Admission Status  I anticipate this patient will require at least two midnights for appropriate medical management, necessitating inpatient admission because patient has respiratory failure with hypoxia and will require at least 48 hours of inpatient management to correct the situation.    Patient will need a Med/Surg bed on MEDICAL service .  The need is secondary to respiratory failure with hypoxia.    * Acute respiratory failure with hypoxia (HCC)- (present on admission)  Assessment & Plan  Patient says she has reactive airway disease but has been progressively getting worse on her respiratory status.  She says on Monday she started to have mild shortness of breath and by Tuesday and Wednesday the patient had worsening shortness of breath.  On Thursday she says she could hardly breathe and Friday it got to the point where she wanted to come to the hospital and today she finally did come to the hospital.  The patient at this point has diffuse crackles throughout all lung fields.  The patient at this point will need steroids and nebulizer treatments.  The patient will be  evaluated with a respiratory viral panel she is negative for COVID-19 influenza and RSV.  No current infiltrates on chest x-ray to indicate bacterial infection but we will at this point evaluate with a procalcitonin level.    Polymyalgia rheumatica (HCC)- (present on admission)  Assessment & Plan  Continue with pain management.  She is currently not on steroids    Hypertension- (present on admission)  Assessment & Plan  Optimize blood pressure management keep systolic blood pressure less than 140 diastolic under 90  Continue with losartan 50 mg every day and Labetalol as needed    DNR (do not resuscitate)  Assessment & Plan  Discussed with patient and she wishes to be DO NOT RESUSCITATE CODE STATUS    Chronic kidney disease, stage 3a (HCC)- (present on admission)  Assessment & Plan  Monitor renal functions avoid nephrotoxic medications    Hypercholesterolemia- (present on admission)  Assessment & Plan  Low-fat low-cholesterol diet  Statin in the form of Mevacor 40 mg nightly  Fasting lipid panel    Vitamin D deficiency- (present on admission)  Assessment & Plan  Continue with vitamin D supplementation    Gastroesophageal reflux disease- (present on admission)  Assessment & Plan  Continue omeprazole    Obesity (BMI 30.0-34.9)- (present on admission)  Assessment & Plan  Body mass index is 30.86 kg/m².  Outpatient weight loss management program and lifestyle modification highly recommended        VTE prophylaxis: SCDs/TEDs

## 2023-06-04 NOTE — PROGRESS NOTES
Hospital Medicine Daily Progress Note    Date of Service  6/4/2023    Chief Complaint  Iveth Martin is a 89 y.o. female admitted 6/3/2023 with shortness of breath and cough.    Hospital Course  Patient is a 89-year-old female who presented with shortness of breath.  She has issues with reactive airway disease and earlier this week started having increasing shortness of breath and cough.  As the week progressed she continued to worsen to where she decided to come to the emergency room for further evaluation.  Patient with coughing fits with no evidence of wheezing on examination.  COVID, RSV and influenza are negative.  Human metapneumovirus positive, likely the etiology of her cough.  We will continue with steroids and supportive care and wean from oxygen as able.    Interval Problem Update  6/4 patient is showing improvements since admission, she states her cough has improved but is definitely still very present.  She is requiring 1 L of oxygen to maintain saturation in home is adamant that she is not returning home with oxygen as she cannot get around with it.  Potassium is low at 3.1, p.o. supplementation given.  WBC count is slightly elevated however this is likely due to steroids.  Human metapneumovirus positive on viral PCR.    I have discussed this patient's plan of care and discharge plan at IDT rounds today with Case Management, Nursing, Nursing leadership, and other members of the IDT team.    Consultants/Specialty  none    Code Status  DNAR/DNI    Disposition  The patient is not medically cleared for discharge to home or a post-acute facility.  Anticipate discharge to: home with close outpatient follow-up    I have placed the appropriate orders for post-discharge needs.    Review of Systems  Review of Systems   Constitutional:  Negative for chills and fever.   HENT:  Negative for congestion.    Eyes:  Negative for blurred vision and photophobia.   Respiratory:  Positive for cough, sputum production  (Unable to cough it up however) and shortness of breath.    Cardiovascular:  Negative for chest pain, claudication and leg swelling.   Gastrointestinal:  Negative for abdominal pain, constipation, diarrhea, heartburn and vomiting.   Genitourinary:  Negative for dysuria and hematuria.   Musculoskeletal:  Negative for joint pain and myalgias.   Skin:  Negative for itching and rash.   Neurological:  Negative for dizziness, sensory change, speech change, weakness and headaches.   Psychiatric/Behavioral:  Negative for depression. The patient is not nervous/anxious and does not have insomnia.         Physical Exam  Temp:  [36.2 °C (97.2 °F)-38 °C (100.4 °F)] 36.4 °C (97.6 °F)  Pulse:  [] 83  Resp:  [15-26] 20  BP: ()/(60-82) 138/80  SpO2:  [87 %-97 %] 88 %    Physical Exam  Vitals and nursing note reviewed.   Constitutional:       General: She is not in acute distress.     Appearance: Normal appearance. She is not ill-appearing.   HENT:      Head: Normocephalic and atraumatic.      Nose: Nose normal.   Cardiovascular:      Rate and Rhythm: Normal rate and regular rhythm.      Heart sounds: Normal heart sounds. No murmur heard.  Pulmonary:      Effort: Pulmonary effort is normal. No respiratory distress.      Breath sounds: Rhonchi and rales (Bilateral) present. No wheezing.   Abdominal:      General: Bowel sounds are normal. There is no distension.      Palpations: Abdomen is soft.   Musculoskeletal:         General: No swelling or tenderness.      Cervical back: Neck supple.   Skin:     General: Skin is warm and dry.   Neurological:      General: No focal deficit present.      Mental Status: She is alert and oriented to person, place, and time.   Psychiatric:         Mood and Affect: Mood normal.         Fluids    Intake/Output Summary (Last 24 hours) at 6/4/2023 1254  Last data filed at 6/4/2023 0806  Gross per 24 hour   Intake 600 ml   Output 200 ml   Net 400 ml       Laboratory  Recent Labs      06/03/23  1547 06/04/23  0013   WBC 14.2* 13.5*   RBC 4.88 4.59   HEMOGLOBIN 14.2 13.4   HEMATOCRIT 42.0 39.9   MCV 86.1 86.9   MCH 29.1 29.2   MCHC 33.8 33.6   RDW 44.5 45.2   PLATELETCT 294 255   MPV 10.2 10.2     Recent Labs     06/03/23  1547 06/04/23  0013   SODIUM 133* 129*   POTASSIUM 3.4* 3.1*   CHLORIDE 94* 91*   CO2 25 24   GLUCOSE 129* 170*   BUN 17 15   CREATININE 0.85 0.76   CALCIUM 9.1 9.0                   Imaging  CT-MAXILLOFACIAL W/O PLUS RECONS   Final Result      1.  Mild to moderate ethmoid sinus disease, without air-fluid levels.   2.  The rest of the paranasal sinuses are relatively clear.   3.  No maxillofacial fractures.      DX-CHEST-PORTABLE (1 VIEW)   Final Result      1.  There is no acute cardiopulmonary process.           Assessment/Plan  * Acute respiratory failure with hypoxia (HCC)- (present on admission)  Assessment & Plan  Patient says she has reactive airway disease but has been progressively getting worse on her respiratory status.  Continue need steroids and nebulizer treatments.    COVID, influenza, RSV negative   Procalcitonin normal at 0.09   No need for antibiotics at this time      DNR (do not resuscitate)  Assessment & Plan  Discussed with patient and she wishes to be DO NOT RESUSCITATE CODE STATUS    Chronic kidney disease, stage 3a (HCC)- (present on admission)  Assessment & Plan  Monitor renal functions avoid nephrotoxic medications    Hypercholesterolemia- (present on admission)  Assessment & Plan  Low-fat low-cholesterol diet  Statin in the form of Mevacor 40 mg nightly  Fasting lipid panel    Vitamin D deficiency- (present on admission)  Assessment & Plan  Continue with vitamin D supplementation    Polymyalgia rheumatica (HCC)- (present on admission)  Assessment & Plan  Continue with pain management.  She is currently not on steroids    Hypertension- (present on admission)  Assessment & Plan  Optimize blood pressure management keep systolic blood pressure less than  140 diastolic under 90  Continue with losartan 50 mg every day and Labetalol as needed    Gastroesophageal reflux disease- (present on admission)  Assessment & Plan  Continue omeprazole    Obesity (BMI 30.0-34.9)- (present on admission)  Assessment & Plan  Body mass index is 30.86 kg/m².  Outpatient weight loss management program and lifestyle modification highly recommended         VTE prophylaxis: SCDs/TEDs    I have performed a physical exam and reviewed and updated ROS and Plan today (6/4/2023). In review of yesterday's note (6/3/2023), there are no changes except as documented above.

## 2023-06-04 NOTE — ASSESSMENT & PLAN NOTE
Optimize blood pressure management keep systolic blood pressure less than 140 diastolic under 90  Continue with losartan 50 mg every day and Labetalol as needed

## 2023-06-04 NOTE — PROGRESS NOTES
Report received from day shift JOÃO Carvalho at 19:05.  1917 Seen pt in room AO4. Denies pain and nausea at this time. VS assessed. Continuous SpO2 monitoring in place.Pt on 2L via NC. Safety precautions in place, call light within reach.  Pt requesting cough med to suppress cough. MD made aware. Hourly rounding in progress.

## 2023-06-04 NOTE — DISCHARGE PLANNING
Met with pt and her friend Saurabh Galicia was at the bedside. Pt stated that she is independent with ADLs and IADLs. Pt said she is very active. Does not use any DMEs.     PCP is LIAM Vanegas  Pharmacy is [facility] on [facility].    Care Transition Team Assessment    Information Source  Orientation Level: Oriented X4  Information Given By: Patient  Who is responsible for making decisions for patient? : Patient    Readmission Evaluation  Is this a readmission?: No    Elopement Risk  Legal Hold: No  Ambulatory or Self Mobile in Wheelchair: No-Not an Elopement Risk    Interdisciplinary Discharge Planning  Does Admitting Nurse Feel This Could be a Complex Discharge?: No  Primary Care Physician: Belkis WHEELER  Lives with - Patient's Self Care Capacity: Alone and Able to Care For Self  Patient or legal guardian wants to designate a caregiver: No  Support Systems: Family Member(s), Friends / Neighbors  Housing / Facility: [address]  Do You Take your Prescribed Medications Regularly: Yes  Able to Return to Previous ADL's: Yes  Mobility Issues: No  Prior Services: None, Home-Independent  Patient Prefers to be Discharged to:: Home  Assistance Needed: Yes  Durable Medical Equipment: Not Applicable    Discharge Preparedness  What is your plan after discharge?: Home with help  What are your discharge supports?: Child  Prior Functional Level: Ambulatory, Drives Self, Independent with Activities of Daily Living, Independent with Medication Management  Difficulity with ADLs: None  Difficulity with IADLs: None    Functional Assesment  Prior Functional Level: Ambulatory, Drives Self, Independent with Activities of Daily Living, Independent with Medication Management    Finances  Financial Barriers to Discharge: No  Prescription Coverage: Yes    Vision / Hearing Impairment  Vision Impairment : Yes  Hearing Impairment : No    Values / Beliefs / Concerns  Values / Beliefs Concerns : No    Advance Directive  Advance Directive?:  None    Domestic Abuse  Have you ever been the victim of abuse or violence?: No    Psychological Assessment  History of Substance Abuse: None    Discharge Risks or Barriers  Discharge risks or barriers?: No  Patient risk factors: Other (comment)    Anticipated Discharge Information  Discharge Disposition: Discharged to home/self care (01)

## 2023-06-05 ENCOUNTER — HOME HEALTH ADMISSION (OUTPATIENT)
Dept: HOME HEALTH SERVICES | Facility: HOME HEALTHCARE | Age: 88
End: 2023-06-05
Payer: MEDICARE

## 2023-06-05 LAB
ANION GAP SERPL CALC-SCNC: 10 MMOL/L (ref 7–16)
BUN SERPL-MCNC: 25 MG/DL (ref 8–22)
CALCIUM SERPL-MCNC: 9.4 MG/DL (ref 8.4–10.2)
CHLORIDE SERPL-SCNC: 96 MMOL/L (ref 96–112)
CO2 SERPL-SCNC: 27 MMOL/L (ref 20–33)
CREAT SERPL-MCNC: 0.88 MG/DL (ref 0.5–1.4)
ERYTHROCYTE [DISTWIDTH] IN BLOOD BY AUTOMATED COUNT: 45 FL (ref 35.9–50)
GFR SERPLBLD CREATININE-BSD FMLA CKD-EPI: 62 ML/MIN/1.73 M 2
GLUCOSE SERPL-MCNC: 155 MG/DL (ref 65–99)
HCT VFR BLD AUTO: 38.9 % (ref 37–47)
HGB BLD-MCNC: 13.2 G/DL (ref 12–16)
MCH RBC QN AUTO: 29.5 PG (ref 27–33)
MCHC RBC AUTO-ENTMCNC: 33.9 G/DL (ref 32.2–35.5)
MCV RBC AUTO: 86.8 FL (ref 81.4–97.8)
PLATELET # BLD AUTO: 295 K/UL (ref 164–446)
PMV BLD AUTO: 10.5 FL (ref 9–12.9)
POTASSIUM SERPL-SCNC: 3.5 MMOL/L (ref 3.6–5.5)
RBC # BLD AUTO: 4.48 M/UL (ref 4.2–5.4)
SODIUM SERPL-SCNC: 133 MMOL/L (ref 135–145)
WBC # BLD AUTO: 20.4 K/UL (ref 4.8–10.8)

## 2023-06-05 PROCEDURE — 80048 BASIC METABOLIC PNL TOTAL CA: CPT

## 2023-06-05 PROCEDURE — 36415 COLL VENOUS BLD VENIPUNCTURE: CPT

## 2023-06-05 PROCEDURE — 99232 SBSQ HOSP IP/OBS MODERATE 35: CPT | Performed by: INTERNAL MEDICINE

## 2023-06-05 PROCEDURE — 94640 AIRWAY INHALATION TREATMENT: CPT

## 2023-06-05 PROCEDURE — 700102 HCHG RX REV CODE 250 W/ 637 OVERRIDE(OP): Performed by: INTERNAL MEDICINE

## 2023-06-05 PROCEDURE — 700111 HCHG RX REV CODE 636 W/ 250 OVERRIDE (IP): Performed by: HOSPITALIST

## 2023-06-05 PROCEDURE — A9270 NON-COVERED ITEM OR SERVICE: HCPCS | Performed by: INTERNAL MEDICINE

## 2023-06-05 PROCEDURE — 94760 N-INVAS EAR/PLS OXIMETRY 1: CPT

## 2023-06-05 PROCEDURE — 700102 HCHG RX REV CODE 250 W/ 637 OVERRIDE(OP): Performed by: HOSPITALIST

## 2023-06-05 PROCEDURE — A9270 NON-COVERED ITEM OR SERVICE: HCPCS | Performed by: HOSPITALIST

## 2023-06-05 PROCEDURE — 770001 HCHG ROOM/CARE - MED/SURG/GYN PRIV*

## 2023-06-05 PROCEDURE — 85027 COMPLETE CBC AUTOMATED: CPT

## 2023-06-05 PROCEDURE — 700101 HCHG RX REV CODE 250: Performed by: INTERNAL MEDICINE

## 2023-06-05 RX ORDER — METHOCARBAMOL 500 MG/1
750 TABLET, FILM COATED ORAL
Status: DISCONTINUED | OUTPATIENT
Start: 2023-06-05 | End: 2023-06-06 | Stop reason: HOSPADM

## 2023-06-05 RX ORDER — IPRATROPIUM BROMIDE AND ALBUTEROL SULFATE 2.5; .5 MG/3ML; MG/3ML
3 SOLUTION RESPIRATORY (INHALATION)
Status: DISCONTINUED | OUTPATIENT
Start: 2023-06-05 | End: 2023-06-06 | Stop reason: HOSPADM

## 2023-06-05 RX ORDER — FLUTICASONE PROPIONATE 110 UG/1
1 AEROSOL, METERED RESPIRATORY (INHALATION)
Status: DISCONTINUED | OUTPATIENT
Start: 2023-06-05 | End: 2023-06-06 | Stop reason: HOSPADM

## 2023-06-05 RX ORDER — HYDROCHLOROTHIAZIDE 25 MG/1
25 TABLET ORAL EVERY MORNING
Status: DISCONTINUED | OUTPATIENT
Start: 2023-06-05 | End: 2023-06-06 | Stop reason: HOSPADM

## 2023-06-05 RX ADMIN — OMEPRAZOLE 40 MG: 20 CAPSULE, DELAYED RELEASE ORAL at 23:12

## 2023-06-05 RX ADMIN — ZOLPIDEM TARTRATE 5 MG: 5 TABLET ORAL at 23:12

## 2023-06-05 RX ADMIN — IPRATROPIUM BROMIDE AND ALBUTEROL SULFATE 3 ML: .5; 3 SOLUTION RESPIRATORY (INHALATION) at 18:45

## 2023-06-05 RX ADMIN — METHYLPREDNISOLONE SODIUM SUCCINATE 20 MG: 40 INJECTION, POWDER, FOR SOLUTION INTRAMUSCULAR; INTRAVENOUS at 12:11

## 2023-06-05 RX ADMIN — METHYLPREDNISOLONE SODIUM SUCCINATE 20 MG: 40 INJECTION, POWDER, FOR SOLUTION INTRAMUSCULAR; INTRAVENOUS at 23:13

## 2023-06-05 RX ADMIN — ENOXAPARIN SODIUM 40 MG: 100 INJECTION SUBCUTANEOUS at 17:14

## 2023-06-05 RX ADMIN — IPRATROPIUM BROMIDE AND ALBUTEROL SULFATE 3 ML: .5; 3 SOLUTION RESPIRATORY (INHALATION) at 09:51

## 2023-06-05 RX ADMIN — GABAPENTIN 200 MG: 100 CAPSULE ORAL at 17:14

## 2023-06-05 RX ADMIN — OMEPRAZOLE 40 MG: 20 CAPSULE, DELAYED RELEASE ORAL at 09:06

## 2023-06-05 RX ADMIN — IPRATROPIUM BROMIDE AND ALBUTEROL SULFATE 3 ML: .5; 3 SOLUTION RESPIRATORY (INHALATION) at 22:09

## 2023-06-05 RX ADMIN — LOSARTAN POTASSIUM 50 MG: 25 TABLET, FILM COATED ORAL at 05:10

## 2023-06-05 RX ADMIN — HYDROCHLOROTHIAZIDE 25 MG: 25 TABLET ORAL at 13:21

## 2023-06-05 RX ADMIN — FLUTICASONE PROPIONATE 110 MCG: 110 AEROSOL, METERED RESPIRATORY (INHALATION) at 05:13

## 2023-06-05 RX ADMIN — BENZOCAINE AND MENTHOL 1 LOZENGE: 15; 3.6 LOZENGE ORAL at 16:45

## 2023-06-05 RX ADMIN — METHYLPREDNISOLONE SODIUM SUCCINATE 20 MG: 40 INJECTION, POWDER, FOR SOLUTION INTRAMUSCULAR; INTRAVENOUS at 17:14

## 2023-06-05 RX ADMIN — GABAPENTIN 200 MG: 100 CAPSULE ORAL at 05:10

## 2023-06-05 RX ADMIN — METHYLPREDNISOLONE SODIUM SUCCINATE 20 MG: 40 INJECTION, POWDER, FOR SOLUTION INTRAMUSCULAR; INTRAVENOUS at 05:10

## 2023-06-05 RX ADMIN — LOVASTATIN 40 MG: 20 TABLET ORAL at 17:14

## 2023-06-05 RX ADMIN — IPRATROPIUM BROMIDE AND ALBUTEROL SULFATE 3 ML: .5; 3 SOLUTION RESPIRATORY (INHALATION) at 14:23

## 2023-06-05 RX ADMIN — METHYLPREDNISOLONE SODIUM SUCCINATE 20 MG: 40 INJECTION, POWDER, FOR SOLUTION INTRAMUSCULAR; INTRAVENOUS at 00:06

## 2023-06-05 RX ADMIN — FLUTICASONE PROPIONATE 110 MCG: 110 AEROSOL, METERED RESPIRATORY (INHALATION) at 18:46

## 2023-06-05 ASSESSMENT — ENCOUNTER SYMPTOMS
PHOTOPHOBIA: 0
CONSTIPATION: 0
BLURRED VISION: 0
NERVOUS/ANXIOUS: 0
FEVER: 0
MYALGIAS: 0
INSOMNIA: 0
SPEECH CHANGE: 0
WEAKNESS: 0
DIZZINESS: 0
CLAUDICATION: 0
HEARTBURN: 0
CHILLS: 0
COUGH: 1
SHORTNESS OF BREATH: 1
DIARRHEA: 0
VOMITING: 0
DEPRESSION: 0
SPUTUM PRODUCTION: 1
ABDOMINAL PAIN: 0
SENSORY CHANGE: 0
HEADACHES: 0
WHEEZING: 1

## 2023-06-05 ASSESSMENT — PAIN DESCRIPTION - PAIN TYPE
TYPE: ACUTE PAIN
TYPE: ACUTE PAIN

## 2023-06-05 NOTE — DISCHARGE PLANNING
Received Choice form at: 8158  Agency/Facility name: [facility]  Referral sent per Choice form at: 1856

## 2023-06-05 NOTE — DISCHARGE PLANNING
HTH/SCP TCN chart review completed. Collaborated with FILIBERTO Barth prior to meeting with the pt. The most current review of medical record, knowledge of pt's PLOF and social support, LACE+ score of 74 and  6 clicks scores of 22 for ADLs and 21 for mobility were considered. Per chart review, patient on 1L O2 and is hoping to wean off O2 by time of discharge.    OT eval was just completed as TCN arrived and OT stated that patient was at her baseline and would not need any further therapy upon discharge and likely not need further PT services as well. Note OT and PT evals not entered in Epic yet, so TCN to f/u with recs.    PT eval pending.    Pt seen at bedside. Introduced TCN program. Provided education regarding post acute levels of care. Discussed HTH/SCP plan benefits (Meds to Beds, medical uber and [facility] transitional care). Pt verbalizes understanding. Patient states she lives alone with a son living in [place], [place]. She has close friends/neighbors living next door that can help if needed. She is independent with mobility, ADLs, IADLs, and driving. She feels she is at her functional baseline and is hoping to go home with outpatient f/u.     Choice proactively obtained for DME (O2) and faxed to [facility]. TCN will continue to follow and collaborate with discharge planning team as additional post acute needs arise. Thank you.     Completed today:  OT eval with recs for no further needs  PT eval pending  Choice obtained: DME (O2)  [facility] referral sent 6/4/23    *Addendum 1700 - PT eval completed with recommendation for HH. TCN or CM to obtain HH choice Monday 6/5/23.

## 2023-06-05 NOTE — DISCHARGE PLANNING
ATTN: Case Management  RE: Referral for Home Health    As of 6/5/2023, we have accepted the Home Health referral for the patient listed above.    A [facility] clinician will be out to see the patient within 48 hours. If you have any questions or concerns regarding the patient's transition to Home Health, please do not hesitate to contact us at [phone].      We look forward to collaborating with you,  [facility] Team

## 2023-06-05 NOTE — DISCHARGE PLANNING
HTH/SCP TCN chart review completed. Noted previous TCN note, noting PT eval completed with recommendations for HH. Collaborated with RAVI Johnson.     TCN met with patient via telephone encounter. Discussed PT recommendations and HHC benefit. Choice obtained for HH through [facility], faxed to [facility] and given to CM. Education and discussion completed regarding HH benefits. [facility] customer service number provided at patient request to answer plan related questions related to current hospitalization.     TCN will continue to follow and collaborate with discharge planning team as additional post acute needs arise. Thank you.    Completed  PT with recommendations for HHPT on 6/4/2023. OT anticipating no further occupational therapy needs on 6/4/2023  Choice obtained today: HH   Choice previously obtained: DME (O2)  [facility] referral sent 6/4/23

## 2023-06-05 NOTE — FACE TO FACE
Face to Face Supporting Documentation - Home Health    The encounter with this patient was in whole or in part the primary reason for home health admission.    Date of encounter:   Patient:                    MRN:                       YOB: 2023  Iveth Martin  [id number]  4/10/1933     Home health to see patient for:  Skilled Nursing care for assessment, interventions & education, Physical Therapy evaluation and treatment, and Occupational therapy evaluation and treatment    Skilled need for:  Exacerbation of Chronic Disease State reactive airway disease and bronchitis.    Skilled nursing interventions to include:  Comment: .    Homebound status evidenced by:  Needs the assistance of another person in order to leave the home. Leaving home requires a considerable and taxing effort. There is a normal inability to leave the home.    Community Physician to provide follow up care: Belkis Vanegas P.A.-C.     Optional Interventions? No      I certify the face to face encounter for this home health care referral meets the CMS requirements and the encounter/clinical assessment with the patient was, in whole, or in part, for the medical condition(s) listed above, which is the primary reason for home health care. Based on my clinical findings: the service(s) are medically necessary, support the need for home health care, and the homebound criteria are met.  I certify that this patient has had a face to face encounter by myself.  Marielena Casanova D.O. - NPI: [id number]

## 2023-06-05 NOTE — PROGRESS NOTES
Hospital Medicine Daily Progress Note    Date of Service  6/5/2023    Chief Complaint  Iveth Martin is a 89 y.o. female admitted 6/3/2023 with shortness of breath and cough.    Hospital Course  Patient is a 89-year-old female who presented with shortness of breath.  She has issues with reactive airway disease and earlier this week started having increasing shortness of breath and cough.  As the week progressed she continued to worsen to where she decided to come to the emergency room for further evaluation.  Patient with coughing fits with no evidence of wheezing on examination.  COVID, RSV and influenza are negative.  Human metapneumovirus positive, likely the etiology of her cough.  We will continue with steroids and supportive care and wean from oxygen as able.    Interval Problem Update  6/4 patient is showing improvements since admission, she states her cough has improved but is definitely still very present.  She is requiring 1 L of oxygen to maintain saturation in home is adamant that she is not returning home with oxygen as she cannot get around with it.  Potassium is low at 3.1, p.o. supplementation given.  WBC count is slightly elevated however this is likely due to steroids.  Human metapneumovirus positive on viral PCR.  6/5 patient with some improvement overall with her oxygenation is down to 1 L sometimes on room air.  She started developing significant wheezing overnight and RT protocol initiated with DuoNebs every 4 hours around-the-clock for the next 24 hours.  Patient does not want to discharge with oxygen thus we will continue with our current interventions in an effort to wean her from oxygen completely.    I have discussed this patient's plan of care and discharge plan at IDT rounds today with Case Management, Nursing, Nursing leadership, and other members of the IDT team.    Consultants/Specialty  none    Code Status  DNAR/DNI    Disposition  The patient is not medically cleared for  discharge to home or a post-acute facility.  Anticipate discharge to: home with close outpatient follow-up    I have placed the appropriate orders for post-discharge needs.    Review of Systems  Review of Systems   Constitutional:  Negative for chills and fever.   HENT:  Negative for congestion.    Eyes:  Negative for blurred vision and photophobia.   Respiratory:  Positive for cough, sputum production (Unable to cough it up however), shortness of breath and wheezing.    Cardiovascular:  Negative for chest pain, claudication and leg swelling.   Gastrointestinal:  Negative for abdominal pain, constipation, diarrhea, heartburn and vomiting.   Genitourinary:  Negative for dysuria and hematuria.   Musculoskeletal:  Negative for joint pain and myalgias.   Skin:  Negative for itching and rash.   Neurological:  Negative for dizziness, sensory change, speech change, weakness and headaches.   Psychiatric/Behavioral:  Negative for depression. The patient is not nervous/anxious and does not have insomnia.         Physical Exam  Temp:  [36.3 °C (97.3 °F)-36.6 °C (97.9 °F)] 36.6 °C (97.9 °F)  Pulse:  [66-88] 79  Resp:  [16-18] 18  BP: (119-133)/(64-78) 119/64  SpO2:  [89 %-95 %] 89 %    Physical Exam  Vitals and nursing note reviewed.   Constitutional:       General: She is not in acute distress.     Appearance: Normal appearance. She is not ill-appearing.   HENT:      Head: Normocephalic and atraumatic.      Nose: Nose normal.   Cardiovascular:      Rate and Rhythm: Normal rate and regular rhythm.      Heart sounds: Normal heart sounds. No murmur heard.  Pulmonary:      Effort: Pulmonary effort is normal. No respiratory distress.      Breath sounds: Wheezing, rhonchi and rales (Bilateral) present.   Chest:      Chest wall: No tenderness.   Abdominal:      General: Bowel sounds are normal. There is no distension.      Palpations: Abdomen is soft.   Musculoskeletal:         General: No swelling or tenderness.      Cervical back:  Neck supple.   Skin:     General: Skin is warm and dry.   Neurological:      General: No focal deficit present.      Mental Status: She is alert and oriented to person, place, and time.   Psychiatric:         Mood and Affect: Mood normal.         Fluids    Intake/Output Summary (Last 24 hours) at 6/5/2023 1216  Last data filed at 6/4/2023 2200  Gross per 24 hour   Intake 480 ml   Output --   Net 480 ml         Laboratory  Recent Labs     06/03/23  1547 06/04/23  0013 06/05/23  0304   WBC 14.2* 13.5* 20.4*   RBC 4.88 4.59 4.48   HEMOGLOBIN 14.2 13.4 13.2   HEMATOCRIT 42.0 39.9 38.9   MCV 86.1 86.9 86.8   MCH 29.1 29.2 29.5   MCHC 33.8 33.6 33.9   RDW 44.5 45.2 45.0   PLATELETCT 294 255 295   MPV 10.2 10.2 10.5       Recent Labs     06/03/23  1547 06/04/23  0013 06/05/23  0304   SODIUM 133* 129* 133*   POTASSIUM 3.4* 3.1* 3.5*   CHLORIDE 94* 91* 96   CO2 25 24 27   GLUCOSE 129* 170* 155*   BUN 17 15 25*   CREATININE 0.85 0.76 0.88   CALCIUM 9.1 9.0 9.4                     Imaging  CT-MAXILLOFACIAL W/O PLUS RECONS   Final Result      1.  Mild to moderate ethmoid sinus disease, without air-fluid levels.   2.  The rest of the paranasal sinuses are relatively clear.   3.  No maxillofacial fractures.      DX-CHEST-PORTABLE (1 VIEW)   Final Result      1.  There is no acute cardiopulmonary process.             Assessment/Plan  * Acute respiratory failure with hypoxia (HCC)- (present on admission)  Assessment & Plan  Patient is having significant wheezing today  Start scheduled nebulizer with DuoNebs every 4 hours ATC  Continue need steroids and nebulizer treatments.    COVID, influenza, RSV negative   Procalcitonin normal at 0.09   No need for antibiotics at this time      DNR (do not resuscitate)  Assessment & Plan  Discussed with patient and she wishes to be DO NOT RESUSCITATE CODE STATUS    Chronic kidney disease, stage 3a (HCC)- (present on admission)  Assessment & Plan  Monitor renal functions avoid nephrotoxic  medications    Hypercholesterolemia- (present on admission)  Assessment & Plan  Low-fat low-cholesterol diet  Statin in the form of Mevacor 40 mg nightly  Fasting lipid panel    Vitamin D deficiency- (present on admission)  Assessment & Plan  Continue with vitamin D supplementation    Polymyalgia rheumatica (HCC)- (present on admission)  Assessment & Plan  Continue with pain management.  She is currently not on steroids    Hypertension- (present on admission)  Assessment & Plan  Optimize blood pressure management keep systolic blood pressure less than 140 diastolic under 90  Continue with losartan 50 mg every day and Labetalol as needed    Gastroesophageal reflux disease- (present on admission)  Assessment & Plan  Continue omeprazole    Obesity (BMI 30.0-34.9)- (present on admission)  Assessment & Plan  Body mass index is 30.86 kg/m².  Outpatient weight loss management program and lifestyle modification highly recommended         VTE prophylaxis: SCDs/TEDs    I have performed a physical exam and reviewed and updated ROS and Plan today (6/5/2023). In review of yesterday's note (6/4/2023), there are no changes except as documented above.

## 2023-06-05 NOTE — CARE PLAN
The patient is Stable - Low risk of patient condition declining or worsening    Shift Goals  Clinical Goals: Pt will be able to wean off oxygen. Pt will be able to maintain saturation above 90%.  Patient Goals: sleep and rest.  Family Goals: n/a    Progress made toward(s) clinical / shift goals:  Pt still has cough. Scheduled medications given. Pt saturation maintained at 93%.        Patient is not progressing towards the following goals: Pt still with oxygen at 1 L/min via nasal cannula.     Problem: Respiratory  Goal: Patient will achieve/maintain optimum respiratory ventilation and gas exchange  6/5/2023 0630 by Artie David R.NKaris  Outcome: Not Progressing

## 2023-06-05 NOTE — FLOWSHEET NOTE
06/05/23 0951   Events/Summary/Plan   Events/Summary/Plan SVN given   Skin Inspection Respiratory Device Intact   Vital Signs   Pulse 68   Respiration 18   Pulse Oximetry 91 %   $ Pulse Oximetry (Spot Check) Yes   Respiratory Assessment   Respiratory Pattern Within Normal Limits   Level of Consciousness Alert   Chest Exam   Work Of Breathing / Effort Shallow;Mild   Breath Sounds   RUL Breath Sounds Diminished   RML Breath Sounds Diminished   RLL Breath Sounds Diminished   BRITNEY Breath Sounds Diminished   LLL Breath Sounds Diminished   Oxygen   O2 (LPM) 1   O2 Delivery Device Silicone Nasal Cannula

## 2023-06-06 ENCOUNTER — TELEPHONE (OUTPATIENT)
Dept: HEALTH INFORMATION MANAGEMENT | Facility: OTHER | Age: 88
End: 2023-06-06
Payer: MEDICARE

## 2023-06-06 VITALS
RESPIRATION RATE: 16 BRPM | DIASTOLIC BLOOD PRESSURE: 74 MMHG | TEMPERATURE: 97.6 F | WEIGHT: 180.78 LBS | SYSTOLIC BLOOD PRESSURE: 113 MMHG | HEART RATE: 85 BPM | BODY MASS INDEX: 30.86 KG/M2 | HEIGHT: 64 IN | OXYGEN SATURATION: 89 %

## 2023-06-06 LAB
ANION GAP SERPL CALC-SCNC: 12 MMOL/L (ref 7–16)
B PERT DNA SPEC QL NAA+PROBE: NORMAL
BACTERIA UR CULT: NORMAL
BUN SERPL-MCNC: 25 MG/DL (ref 8–22)
CALCIUM SERPL-MCNC: 9.7 MG/DL (ref 8.4–10.2)
CHLORIDE SERPL-SCNC: 95 MMOL/L (ref 96–112)
CO2 SERPL-SCNC: 25 MMOL/L (ref 20–33)
CREAT SERPL-MCNC: 0.89 MG/DL (ref 0.5–1.4)
ERYTHROCYTE [DISTWIDTH] IN BLOOD BY AUTOMATED COUNT: 45.7 FL (ref 35.9–50)
GFR SERPLBLD CREATININE-BSD FMLA CKD-EPI: 61 ML/MIN/1.73 M 2
GLUCOSE SERPL-MCNC: 175 MG/DL (ref 65–99)
HCT VFR BLD AUTO: 40.5 % (ref 37–47)
HGB BLD-MCNC: 13.6 G/DL (ref 12–16)
MCH RBC QN AUTO: 29.2 PG (ref 27–33)
MCHC RBC AUTO-ENTMCNC: 33.6 G/DL (ref 32.2–35.5)
MCV RBC AUTO: 86.9 FL (ref 81.4–97.8)
PLATELET # BLD AUTO: 345 K/UL (ref 164–446)
PMV BLD AUTO: 10.6 FL (ref 9–12.9)
POTASSIUM SERPL-SCNC: 3.4 MMOL/L (ref 3.6–5.5)
RBC # BLD AUTO: 4.66 M/UL (ref 4.2–5.4)
SIGNIFICANT IND 70042: NORMAL
SITE SITE: NORMAL
SODIUM SERPL-SCNC: 132 MMOL/L (ref 135–145)
SOURCE SOURCE: NORMAL
WBC # BLD AUTO: 19.6 K/UL (ref 4.8–10.8)

## 2023-06-06 PROCEDURE — 99239 HOSP IP/OBS DSCHRG MGMT >30: CPT | Performed by: STUDENT IN AN ORGANIZED HEALTH CARE EDUCATION/TRAINING PROGRAM

## 2023-06-06 PROCEDURE — 700111 HCHG RX REV CODE 636 W/ 250 OVERRIDE (IP): Performed by: HOSPITALIST

## 2023-06-06 PROCEDURE — 80048 BASIC METABOLIC PNL TOTAL CA: CPT

## 2023-06-06 PROCEDURE — 94640 AIRWAY INHALATION TREATMENT: CPT

## 2023-06-06 PROCEDURE — 85027 COMPLETE CBC AUTOMATED: CPT

## 2023-06-06 PROCEDURE — 700101 HCHG RX REV CODE 250: Performed by: INTERNAL MEDICINE

## 2023-06-06 PROCEDURE — 36415 COLL VENOUS BLD VENIPUNCTURE: CPT

## 2023-06-06 PROCEDURE — 700111 HCHG RX REV CODE 636 W/ 250 OVERRIDE (IP): Performed by: STUDENT IN AN ORGANIZED HEALTH CARE EDUCATION/TRAINING PROGRAM

## 2023-06-06 PROCEDURE — A9270 NON-COVERED ITEM OR SERVICE: HCPCS | Performed by: INTERNAL MEDICINE

## 2023-06-06 PROCEDURE — 700102 HCHG RX REV CODE 250 W/ 637 OVERRIDE(OP): Performed by: INTERNAL MEDICINE

## 2023-06-06 PROCEDURE — A9270 NON-COVERED ITEM OR SERVICE: HCPCS | Performed by: HOSPITALIST

## 2023-06-06 PROCEDURE — 94760 N-INVAS EAR/PLS OXIMETRY 1: CPT

## 2023-06-06 PROCEDURE — 700102 HCHG RX REV CODE 250 W/ 637 OVERRIDE(OP): Performed by: HOSPITALIST

## 2023-06-06 RX ORDER — PREDNISONE 20 MG/1
40 TABLET ORAL DAILY
Qty: 6 TABLET | Refills: 0 | Status: SHIPPED | OUTPATIENT
Start: 2023-06-07 | End: 2023-06-10

## 2023-06-06 RX ORDER — PREDNISONE 20 MG/1
40 TABLET ORAL DAILY
Status: DISCONTINUED | OUTPATIENT
Start: 2023-06-06 | End: 2023-06-06 | Stop reason: HOSPADM

## 2023-06-06 RX ADMIN — BENZOCAINE AND MENTHOL 1 LOZENGE: 15; 3.6 LOZENGE ORAL at 00:43

## 2023-06-06 RX ADMIN — HYDROCHLOROTHIAZIDE 25 MG: 25 TABLET ORAL at 05:49

## 2023-06-06 RX ADMIN — GABAPENTIN 200 MG: 100 CAPSULE ORAL at 05:48

## 2023-06-06 RX ADMIN — BENZOCAINE AND MENTHOL 1 LOZENGE: 15; 3.6 LOZENGE ORAL at 08:50

## 2023-06-06 RX ADMIN — METHYLPREDNISOLONE SODIUM SUCCINATE 20 MG: 40 INJECTION, POWDER, FOR SOLUTION INTRAMUSCULAR; INTRAVENOUS at 05:51

## 2023-06-06 RX ADMIN — LOSARTAN POTASSIUM 50 MG: 25 TABLET, FILM COATED ORAL at 05:49

## 2023-06-06 RX ADMIN — OMEPRAZOLE 40 MG: 20 CAPSULE, DELAYED RELEASE ORAL at 08:41

## 2023-06-06 RX ADMIN — IPRATROPIUM BROMIDE AND ALBUTEROL SULFATE 3 ML: .5; 3 SOLUTION RESPIRATORY (INHALATION) at 07:28

## 2023-06-06 RX ADMIN — IPRATROPIUM BROMIDE AND ALBUTEROL SULFATE 3 ML: .5; 3 SOLUTION RESPIRATORY (INHALATION) at 10:18

## 2023-06-06 RX ADMIN — PREDNISONE 40 MG: 20 TABLET ORAL at 08:41

## 2023-06-06 RX ADMIN — BENZOCAINE AND MENTHOL 1 LOZENGE: 15; 3.6 LOZENGE ORAL at 15:10

## 2023-06-06 RX ADMIN — FLUTICASONE PROPIONATE 110 MCG: 110 AEROSOL, METERED RESPIRATORY (INHALATION) at 07:29

## 2023-06-06 ASSESSMENT — PAIN DESCRIPTION - PAIN TYPE: TYPE: ACUTE PAIN

## 2023-06-06 NOTE — CARE PLAN
The patient is Stable - Low risk of patient condition declining or worsening    Shift Goals  Clinical Goals: Less coughing episodes during this shift; Wean off oxygen while maintaining O2 sat above 90%  Patient Goals: Rest; Comfort  Family Goals: n/a    Progress made toward(s) clinical / shift goals:  oxygen saturation maintained above 90%    Patient is not progressing towards the following goals:

## 2023-06-06 NOTE — DISCHARGE PLANNING
"HTH/SCP TCN chart review completed. Noted patient accepted to [facility] 6/5/2023. Collaborated with MIKE Lazo. Discussed current discharge considerations noted to home with [facility]  and [facility].    TCN met with patient at bedside. Discussed current discharge considerations with patient stating understanding and agreement to dc home with [facility] and [facility]. Patient advised \"I was told I'm going to need oxygen to return home.\" Collaborated with CM advising choice for DME for oxygen present in media, noting order and face to face for oxygen present. Appreciate CM to follow regarding patient oxygen needs at discharge.     TCN will continue to follow and collaborate with discharge planning team as additional post acute needs arise. Thank you.    Completed  PT with recommendations for HHPT on 6/4/2023. OT anticipating no further occupational therapy needs on 6/4/2023  Choice obtained today: HH. Noted patient accepted to [facility] 6/5/2023  Choice previously obtained: DME (O2). Appreciate CM to follow regarding patient oxygen needs  [facility] referral sent 6/4/23. [facility] updated at patient request to notify that patient preferred contact is cell phone  "

## 2023-06-06 NOTE — PROGRESS NOTES
Received report from day shift nurse. Assumed pt care. Pt is A&Ox4, resting comfortably in bed. Pt on 1 LPM O2 via NC. No signs of SOB/respiratory distress. Labs noted, VSS. Fall precautions in place. Bed at lowest position. Call light and personal belongings within reach. Continue to monitor.

## 2023-06-06 NOTE — DISCHARGE PLANNING
Agency/Facility name: [facility]  JUANITA advised O2 set-up delivered  to patient bedside by 3pm today

## 2023-06-06 NOTE — CARE PLAN
The patient is Stable - Low risk of patient condition declining or worsening    Shift Goals  Clinical Goals: Maintain O2 sat at 90% and above  Patient Goals: Sleep comfortably tonight  Family Goals: n/a    Progress made toward(s) clinical / shift goals:  O2 supplement given via Nasal cannula throughout the shift, Nebulizing treatment done by RT. Hourly rounding done.      Problem: Respiratory  Goal: Patient will achieve/maintain optimum respiratory ventilation and gas exchange  Outcome: Progressing     Problem: Dysphagia  Goal: Dysphagia will improve  Outcome: Progressing       Patient is not progressing towards the following goals: n/a

## 2023-06-06 NOTE — CARE PLAN
Respiratory Update    Treatment modality: SVN DuoNeb  Frequency: Q4    Pt tolerating current treatments well with no adverse reactions.

## 2023-06-06 NOTE — RESPIRATORY CARE
"   COPD EDUCATION by COPD CLINICAL EDUCATOR  6/6/2023 at 8:13 AM by Althea Olivarez RRT     Patient reviewed by COPD education team. Patient does not have a history or diagnosis of COPD and is a former smoker.  However, patient does not qualify for the COPD program, per PFT 2/4/22 Normal FEV1/FVC 80%.    COPD Screen  COPD Risk Screening  Do you have a history of COPD?: No  COPD Population Screener  During the past 4 weeks, how much did you feel short of breath?: None/Little of the time  Do you ever cough up any mucus or phlegm?: No/only with occasional colds or infections  In the past 12 months, you do less than you used to because of your breathing problems: Disagree/unsure  Have you smoked at least 100 cigarettes in your entire life?: Yes  How old are you?: 60+  COPD Screening Score: 4  COPD Coordinator Not Recommended: Yes    COPD Assessment  COPD Clinical Specialists ONLY  COPD Education Initiated: No--Quick Screen (PFT 2/4/22 There is no evidence of obstruction, manifested by FEV1/FVC ratio of 80.16%.   Flow volume curves correlates with the information above. CONCLUSION:  This is a normal test.)  Interdisciplinary Rounds: Attendance at Rounds (30 Min)    PFT Results    No results found for: PFT    Meds to Beds  Would the patient like to opt in for Bedside Medication Delivery at Discharge?: No     MY COPD ACTION PLAN     It is recommended that patients and physicians /healthcare providers complete this action plan together. This plan should be discussed at each physician visit and updated as needed.    The green, yellow and red zones show groups of symptoms of COPD. This list of symptoms is not comprehensive, and you may experience other symptoms. In the \"Actions\" column, your healthcare provider has recommended actions for you to take based on your symptoms.    Patient Name: Iveth Martin   YOB: 1933   Last Updated on:     Green Zone:  I am doing well today Actions     Usual activitiy " "and exercise level   Take daily medications     Usual amounts of cough and phlegm/mucus   Use oxygen as prescribed     Sleep well at night   Continue regular exercise/diet plan     Appetite is good   At all times avoid cigarette smoke, inhaled irritants     Daily Medications (these medications are taken every day):                Yellow Zone:  I am having a bad day or a COPD flare Actions     More breathless than usual   Continue daily medications     I have less energy for my daily activities   Use quick relief inhaler as ordered     Increased or thicker phlegm/mucus   Use oxygen as prescribed     Using quick relief inhaler/nebulizer more often   Get plenty of rest     Swelling of ankles more than usual   Use pursed lip breathing     More coughing than usual   At all times avoid cigarette smoke, inhaled irritants     I feel like I have a \"chest cold\"     Poor sleep and my symptoms woke me up     My appetite is not good     My medicine is not helping      Call provider immediately if symptoms don’t improve     Continue daily medications, add rescue medications:               Medications to be used during a flare up, (as Discussed with Provider):              Red Zone:  I need urgent medical care Actions     Severe shortness of breath even at rest   Call 911 or seek medical care immediately     Not able to do any activity because of breathing      Fever or shaking chills      Feeling confused or very drowsy       Chest pains      Coughing up blood                  "

## 2023-06-06 NOTE — TELEPHONE ENCOUNTER
Iveth called to discuss her plan of care. She is currently hospitalized with hMPV. She is hopeful she will be discharged today. Discussed plan for home health and possibly home physical therapy. She would like assistance with scheduling follow up appointments when she gets home. Will follow at discharge. Also discussed the use of her [email or web address] as she is having trouble with her emails at the moment.

## 2023-06-06 NOTE — FACE TO FACE
"Face to Face Note  -  Durable Medical Equipment    Heather Mcclain M.D. - NPI: [id number]  I certify that this patient is under my care and that they had a durable medical equipment(DME)face to face encounter by myself that meets the physician DME face-to-face encounter requirements with this patient on:    Date of encounter:   Patient:                    MRN:                       YOB: 2023  Iveth Martin  [id number]  4/10/1933     The encounter with the patient was in whole, or in part, for the following medical condition, which is the primary reason for durable medical equipment:  Acute respiratory failure    I certify that, based on my findings, the following durable medical equipment is medically necessary:    Oxygen   HOME O2 Saturation Measurements:(Values must be present for Home Oxygen orders)  Room air sat at rest: 88  Room air sat with amb: 85  With liters of O2: 1, O2 sat at rest with O2: 91  With Liters of O2: 2, O2 sat with amb with O2 : 92  Is the patient mobile?: Yes  If patient feels more short of breath, they can go up to 6 liters per minute and contact healthcare provider.    Supporting Symptoms: The patient requires supplemental oxygen, as the following interventions have been tried with limited or no improvement: \"Ambulation with oximetry.    My Clinical findings support the need for the above equipment due to:  Hypoxia  "

## 2023-06-06 NOTE — DISCHARGE SUMMARY
Discharge Summary    CHIEF COMPLAINT ON ADMISSION  Chief Complaint   Patient presents with    Cough       Reason for Admission  Shortness of Breath; Cold Symptoms     Admission Date  6/3/2023    CODE STATUS  Prior    HPI & HOSPITAL COURSE  Patient is a 89-year-old female with past medical history of polymyalgia rheumatica, hypertension, GERD, who presented with shortness of breath.  She has issues with reactive airway disease and earlier this week started having increasing shortness of breath and cough.  COVID, RSV and influenza are negative.  Human metapneumovirus positive, likely the etiology of her cough.  Her symptoms is likely triggered by virus infection with underlying reactive airway disease/possible asthma.  Patient has been treated with steroids, duoneb, inhaler. Her symptoms improved.   Will Continue prednisone for another 3 days to complete 5 days course.   At discharge, Patient required 2 L oxygen with ambulation.  Home oxygen was delivered.  Home health was arranged     Therefore, she is discharged in good and stable condition to home with organized home healthcare and close outpatient follow-up.    The patient met 2-midnight criteria for an inpatient stay at the time of discharge.    Discharge Date  6/6/2023    FOLLOW UP ITEMS POST DISCHARGE  - Follow up with primary care physician in 1 week.   - Please take the medications as instructed    - Go to the local Emergency Department if you have any worsening condition.       DISCHARGE DIAGNOSES  Principal Problem:    Acute respiratory failure with hypoxia (HCC) (POA: Yes)  Active Problems:    Obesity (BMI 30.0-34.9) (POA: Yes)    Gastroesophageal reflux disease (POA: Yes)    Hypertension (POA: Yes)    Polymyalgia rheumatica (HCC) (POA: Yes)      Overview: Hx of PMR tapered off steroids 12/2022    Vitamin D deficiency (POA: Yes)    Hypercholesterolemia (POA: Yes)    Chronic kidney disease, stage 3a (HCC) (POA: Yes)      Overview: This is a chronic  condition.      Onset: noted on 12//2021      EGFR: 57      Albuminuria:       Stage: III unspecified      HCO3<22: 30        Ca: WNL      Vit D:       PTH:       Anemia: WNL      Low protein diet:  NA                DNR (do not resuscitate) (POA: Unknown)  Resolved Problems:    * No resolved hospital problems. *      FOLLOW UP  Future Appointments   Date Time Provider Department Center   7/13/2023 10:20 AM Belkis Vanegas P.A.-C. [facility]     Belkis Vanegas P.A.-C.  [address] [facility] [address] [place] [id number]  [phone]    Follow up in 1 week(s)  Please call your primary care provider to schedule, recent hospitalization follow up    ISRAEL Quintanilla  [address] [facility] [address]  [facility] [id number] [phone]            MEDICATIONS ON DISCHARGE     Medication List        START taking these medications        Instructions   predniSONE 20 MG Tabs  Start taking on: June 7, 2023  Commonly known as: DELTASONE   Take 2 Tablets by mouth every day for 3 days.  Dose: 40 mg            CONTINUE taking these medications        Instructions   albuterol 108 (90 Base) MCG/ACT Aers inhalation aerosol   Doctor's comments: PATIENT REQUEST  INHALE 2 PUFFS BY INHALATION ROUTE EVERY 4-6 HRS AS NEEDED     chlorpheniramine 4 MG Tabs  Commonly known as: CHLOR-TRIMETRON   Take 4 mg by mouth every morning. Second dose in the evening PRN  Dose: 4 mg     fluticasone 110 MCG/ACT Aero  Commonly known as: Flovent HFA   Inhale 1 Puff 2 times a day.  Dose: 1 Puff     gabapentin 100 MG Caps  Commonly known as: NEURONTIN   Take 200 mg by mouth 2 times a day.  Dose: 200 mg     * guaiFENesin  MG Tb12  Commonly known as: MUCINEX   Take 600 mg by mouth every 12 hours as needed for Cough.  Dose: 600 mg     * guaiFENesin 100 MG/5ML liquid  Commonly known as: Robitussin   Take 10 mL by mouth every 6 hours as needed for Cough.  Dose: 10 mL     hydroCHLOROthiazide 25 MG Tabs  Commonly known as: HYDRODIURIL   Take 25 mg by  "mouth every morning.  Dose: 25 mg     losartan 50 MG Tabs  Commonly known as: COZAAR   Take 1 Tablet by mouth every day.  Dose: 50 mg     lovastatin 40 MG tablet  Commonly known as: MEVACOR   TAKE 1 TABLET BY MOUTH EVERY DAY IN THE EVENING     methocarbamol 750 MG Tabs  Commonly known as: ROBAXIN   Take 750 mg by mouth at bedtime as needed (Pain).  Dose: 750 mg     omeprazole 40 MG delayed-release capsule  Commonly known as: PRILOSEC   Take 1 Capsule by mouth 2 (two) times a day.  Dose: 40 mg     Vitamin D3 2000 UNIT Caps   Take 2 Capsules by mouth every day.  Dose: 2 Capsule     zolpidem 5 MG Tabs  Commonly known as: AMBIEN   Take 5 mg by mouth at bedtime.  Dose: 5 mg           * This list has 2 medication(s) that are the same as other medications prescribed for you. Read the directions carefully, and ask your doctor or other care provider to review them with you.                  Allergies  Allergies   Allergen Reactions    Celecoxib Unspecified     GERD reaction    Lisinopril Cough    Nsaids Unspecified     GERD reaction    Other Drug Unspecified     bioxin    Benadryl Allergy Unspecified     \"tongue gets fuzzy and feel like I am drugged\"    Clarithromycin Vomiting       DIET  No orders of the defined types were placed in this encounter.      ACTIVITY  As tolerated.  Weight bearing as tolerated    CONSULTATIONS      PROCEDURES      LABORATORY  Lab Results   Component Value Date    SODIUM 132 (L) 06/06/2023    POTASSIUM 3.4 (L) 06/06/2023    CHLORIDE 95 (L) 06/06/2023    CO2 25 06/06/2023    GLUCOSE 175 (H) 06/06/2023    BUN 25 (H) 06/06/2023    CREATININE 0.89 06/06/2023    CREATININE 0.9 12/12/2007        Lab Results   Component Value Date    WBC 19.6 (H) 06/06/2023    HEMOGLOBIN 13.6 06/06/2023    HEMATOCRIT 40.5 06/06/2023    PLATELETCT 345 06/06/2023        Total time of the discharge process exceeds 32 minutes.  "

## 2023-06-07 ENCOUNTER — PATIENT OUTREACH (OUTPATIENT)
Dept: MEDICAL GROUP | Facility: MEDICAL CENTER | Age: 88
End: 2023-06-07
Payer: MEDICARE

## 2023-06-07 NOTE — PROGRESS NOTES
Discharge instructions, home medication and follow up appointment discussed with patient and understood. Denies any question at this time. All belongings sent with. Discharged alert and oriented x4 in stable condition. Oxygen concentrator sent home with patient.

## 2023-06-08 ENCOUNTER — PHARMACY VISIT (OUTPATIENT)
Dept: PHARMACY | Facility: MEDICAL CENTER | Age: 88
End: 2023-06-08
Payer: MEDICARE

## 2023-06-08 LAB
BACTERIA BLD CULT: NORMAL
BACTERIA BLD CULT: NORMAL
SIGNIFICANT IND 70042: NORMAL
SIGNIFICANT IND 70042: NORMAL
SITE SITE: NORMAL
SITE SITE: NORMAL
SOURCE SOURCE: NORMAL
SOURCE SOURCE: NORMAL

## 2023-06-08 PROCEDURE — RXOTC WILLOW AMBULATORY OTC CHARGE: Performed by: PHARMACIST

## 2023-06-08 PROCEDURE — RXMED WILLOW AMBULATORY MEDICATION CHARGE: Performed by: PHYSICIAN ASSISTANT

## 2023-06-08 NOTE — PROGRESS NOTES
Iveth returned call, TCM completed     Transitional Care Management    Discharge Questions  Discharge Date: 6/6/23  Outreach call: Date 06/08/23  Time: 12:02 PM   Now that you are home, how are you feeling?  Fair  Did you receive any new prescriptions? Yes, Were you able to get them filled?  Yes   Pharmacy   Do you have any questions about your current medications or new medications (Review Med Rec)?  Yes error/No questions   Do you have a follow up appointment scheduled with your PCP?  No Was an appointment scheduled? No  Declines    Any issues or paperwork you wish to discuss with your PCP? No  Does this patient qualify for the CCM program?  Yes    Transitional Care  Number of attempts: 2  Current or previous attempts competed within two business days of discharge?  Yes  Provided education regarding treatment plan, medications, self-management, ADLs?  Yes  Has patient completed an Advanced Directive?  No  Care Manager phone number provided? Yes  Is there anything else I can help you with?  No    Discharge Summary   Chief Complaint:  Shortness of breath, cold symptoms   Admitting Dx:  acute respiratory failure, human metapneumovirus   Discharge Dx:  acute respiratory failure, human metapneumovirus    Notes:  None

## 2023-06-14 ENCOUNTER — TELEPHONE (OUTPATIENT)
Dept: HEALTH INFORMATION MANAGEMENT | Facility: OTHER | Age: 88
End: 2023-06-14
Payer: MEDICARE

## 2023-06-14 ENCOUNTER — APPOINTMENT (OUTPATIENT)
Dept: RHEUMATOLOGY | Facility: MEDICAL CENTER | Age: 88
End: 2023-06-14
Attending: STUDENT IN AN ORGANIZED HEALTH CARE EDUCATION/TRAINING PROGRAM
Payer: MEDICARE

## 2023-06-14 NOTE — TELEPHONE ENCOUNTER
Received request via: Patient    Was the patient seen in the last year in this department? Yes    Does the patient have an active prescription (recently filled or refills available) for medication(s) requested? No    Does the patient have long term Plus and need 100 day supply (blood pressure, diabetes and cholesterol meds only)? Medication is not for cholesterol, blood pressure or diabetes

## 2023-06-14 NOTE — TELEPHONE ENCOUNTER
Iveth called and left a message requesting a refill of her albuterol inhaler. Returned Iveth's call. Let her know a refrill request has been placed to Belkis Vanegas.Iveth also stated she would like to make an appointment with Belkis. Iveth [place] [facility] She states she would call back to schedule an appointment.

## 2023-06-15 RX ORDER — ALBUTEROL SULFATE 90 UG/1
2 AEROSOL, METERED RESPIRATORY (INHALATION) EVERY 6 HOURS PRN
Qty: 8.5 EACH | Refills: 1 | Status: SHIPPED | OUTPATIENT
Start: 2023-06-15 | End: 2023-08-08

## 2023-06-16 ENCOUNTER — TELEPHONE (OUTPATIENT)
Dept: HEALTH INFORMATION MANAGEMENT | Facility: OTHER | Age: 88
End: 2023-06-16
Payer: MEDICARE

## 2023-06-16 NOTE — TELEPHONE ENCOUNTER
Iveth left a message with a medication question, does she need to continue her Flovent inhaler at a cost of $80. Followed with Belkis Vanegas and [facility] for advice. Returned Iveth call. Instructed Iveth that Belkis feels Iveth should stay on a maintenance inhaler. At her next appointment Belkis could discuss changing to a different prescription. Iveth stated that was not necessary. She would stay on the Flovent. Encouraged Iveth to call with any other questions or concerns.

## 2023-06-22 ENCOUNTER — OFFICE VISIT (OUTPATIENT)
Dept: MEDICAL GROUP | Facility: MEDICAL CENTER | Age: 88
End: 2023-06-22
Payer: MEDICARE

## 2023-06-22 ENCOUNTER — TELEPHONE (OUTPATIENT)
Dept: HEALTH INFORMATION MANAGEMENT | Facility: OTHER | Age: 88
End: 2023-06-22

## 2023-06-22 VITALS
BODY MASS INDEX: 30.39 KG/M2 | HEIGHT: 64 IN | HEART RATE: 86 BPM | WEIGHT: 178 LBS | DIASTOLIC BLOOD PRESSURE: 72 MMHG | OXYGEN SATURATION: 93 % | SYSTOLIC BLOOD PRESSURE: 118 MMHG | TEMPERATURE: 98.2 F | RESPIRATION RATE: 16 BRPM

## 2023-06-22 DIAGNOSIS — J96.01 ACUTE RESPIRATORY FAILURE WITH HYPOXIA (HCC): ICD-10-CM

## 2023-06-22 DIAGNOSIS — R05.2 SUBACUTE COUGH: ICD-10-CM

## 2023-06-22 PROCEDURE — 99213 OFFICE O/P EST LOW 20 MIN: CPT | Performed by: STUDENT IN AN ORGANIZED HEALTH CARE EDUCATION/TRAINING PROGRAM

## 2023-06-22 PROCEDURE — 3074F SYST BP LT 130 MM HG: CPT | Performed by: STUDENT IN AN ORGANIZED HEALTH CARE EDUCATION/TRAINING PROGRAM

## 2023-06-22 PROCEDURE — 3078F DIAST BP <80 MM HG: CPT | Performed by: STUDENT IN AN ORGANIZED HEALTH CARE EDUCATION/TRAINING PROGRAM

## 2023-06-22 ASSESSMENT — FIBROSIS 4 INDEX: FIB4 SCORE: 2.09

## 2023-06-22 NOTE — PROGRESS NOTES
"Subjective:     Chief Complaint   Patient presents with    Hospital Follow-up    Letter for School/Work     To discontinue oxygen       HPI:   Iveth presents today with   Hospital follow-up  Patient was hospitalized 6/3 through 6/6 due to shortness of breath and reactive airways disease.  COVID, RSV and influenza were negative.  Human [place] pneumonia virus likely etiology.  Patient treated with steroids, DuoNeb, inhaler and symptoms improved.  Patient was discharged on prednisone x3 days.  Patient required 2 L oxygen with ambulation.  Patient was provided home oxygen.  Home health arranged.    Did have a TCM follow-up with geriatric medicine and another follow-up 6/14.  Patient oxygen restrictions have decreased from 2 L continuous to 1 L with exertion and nocturnally.    Patient presents today asking for a letter to discontinue oxygen entirely.  He notes that she has not used it at all in the last 3 days.  Patient notes that she has awoken feeling well rested.  Patient notes that he feels significantly better, denies any shortness of breath.  Patient notes that she has not needed nebulizer treatments.    Hypertension  Blood pressure controlled.  Patient continues on hydrochlorothiazide and losartan.        ROS:  Gen: no fevers/chills, no changes in weight  Eyes: no changes in vision  ENT: no sore throat, no hearing loss, no bloody nose  Pulm: no sob, no cough  CV: no chest pain, no palpitations  GI: no nausea/vomiting, no diarrhea        Objective:     Exam:  /72 (BP Location: Left arm, Patient Position: Sitting, BP Cuff Size: Adult)   Pulse 86   Temp 36.8 °C (98.2 °F) (Temporal)   Resp 16   Ht 1.613 m (5' 3.5\")   Wt 80.7 kg (178 lb)   SpO2 93%   BMI 31.04 kg/m²  Body mass index is 31.04 kg/m².    Gen: Alert and oriented, No apparent distress.  Neck: Neck is supple without lymphadenopathy.  Lungs: Normal effort, CTA bilaterally, no wheezes, rhonchi, or rales  CV: Regular rate and rhythm. No murmurs, " rubs, or gallops.  Ext: No clubbing, cyanosis, edema.      Assessment & Plan:     89 y.o. female with the following -     1. Acute respiratory failure with hypoxia (HCC)  2. Subacute cough  Acute, improved.  Patient notes that she was feeling significantly better today.  Patient continues to use spirometer to strengthen lungs.  Patient does 6-minute walk test today without oxygen desaturation.  Patient will continue without oxygen.  Patient has inhalers to use as needed.  Cough has improved.      No follow-ups on file.    Please note that this dictation was created using voice recognition software. I have made every reasonable attempt to correct obvious errors, but I expect that there are errors of grammar and possibly content that I did not discover before finalizing the note.

## 2023-06-22 NOTE — LETTER
June 22, 2023    To Whom It May Concern:         This is confirmation that Iveth Martin attended her scheduled appointment with Belkis Vanegas P.A.-C. on 6/22/23.  Patient had a 6-minute walk test without desaturation, continues to maintain oxygen saturations above 90%.  Patient is cleared to return oxygen supplies to oxygen company.         If you have any questions please do not hesitate to call me at the phone number listed below.    Sincerely,          Belkis Vanegas P.A.-C.  [phone]

## 2023-06-23 ENCOUNTER — PATIENT OUTREACH (OUTPATIENT)
Dept: HEALTH INFORMATION MANAGEMENT | Facility: OTHER | Age: 88
End: 2023-06-23
Payer: MEDICARE

## 2023-06-23 DIAGNOSIS — N18.31 CHRONIC KIDNEY DISEASE, STAGE 3A: ICD-10-CM

## 2023-06-23 DIAGNOSIS — M79.604 BILATERAL LEG PAIN: ICD-10-CM

## 2023-06-23 DIAGNOSIS — I70.0 ATHEROSCLEROSIS OF AORTA (HCC): ICD-10-CM

## 2023-06-23 DIAGNOSIS — M79.605 BILATERAL LEG PAIN: ICD-10-CM

## 2023-06-23 DIAGNOSIS — I10 HYPERTENSION, UNSPECIFIED TYPE: ICD-10-CM

## 2023-06-23 PROCEDURE — 99999 PR NO CHARGE: CPT | Performed by: STUDENT IN AN ORGANIZED HEALTH CARE EDUCATION/TRAINING PROGRAM

## 2023-06-23 NOTE — PROGRESS NOTES
"Assessment  Spoke with Iveth for monthly outreach follow up. She states is doing well. She is no longer on oxygen. Her O2 stats have been stable. She is feeling good. She got a \"clean bill of health\" at her PCP appointment yesterday. Iveth denies any questions or concerns at this time. Encouraged to call with any needs.     Education  Discussed next follow up appointment    Plan of Care and Goals  Continue to increase exercise    Barriers:  Advanced Age  Knowledge of healthcare    Progress:  Progressing     Next outreach:  1 month                          "

## 2023-07-13 ENCOUNTER — OFFICE VISIT (OUTPATIENT)
Dept: MEDICAL GROUP | Facility: MEDICAL CENTER | Age: 88
End: 2023-07-13
Payer: MEDICARE

## 2023-07-13 VITALS
HEART RATE: 73 BPM | TEMPERATURE: 97.2 F | SYSTOLIC BLOOD PRESSURE: 124 MMHG | RESPIRATION RATE: 16 BRPM | BODY MASS INDEX: 29.88 KG/M2 | OXYGEN SATURATION: 92 % | WEIGHT: 175 LBS | DIASTOLIC BLOOD PRESSURE: 72 MMHG | HEIGHT: 64 IN

## 2023-07-13 DIAGNOSIS — F13.20 SEDATIVE HYPNOTIC OR ANXIOLYTIC DEPENDENCE (HCC): ICD-10-CM

## 2023-07-13 DIAGNOSIS — H61.20 IMPACTED CERUMEN, UNSPECIFIED LATERALITY: ICD-10-CM

## 2023-07-13 DIAGNOSIS — M35.3 POLYMYALGIA RHEUMATICA (HCC): ICD-10-CM

## 2023-07-13 DIAGNOSIS — G47.00 INSOMNIA, UNSPECIFIED TYPE: ICD-10-CM

## 2023-07-13 DIAGNOSIS — K21.9 GASTROESOPHAGEAL REFLUX DISEASE, UNSPECIFIED WHETHER ESOPHAGITIS PRESENT: ICD-10-CM

## 2023-07-13 DIAGNOSIS — R73.01 IFG (IMPAIRED FASTING GLUCOSE): ICD-10-CM

## 2023-07-13 DIAGNOSIS — I10 PRIMARY HYPERTENSION: ICD-10-CM

## 2023-07-13 DIAGNOSIS — E66.9 OBESITY (BMI 30.0-34.9): ICD-10-CM

## 2023-07-13 DIAGNOSIS — M06.4 UNDIFFERENTIATED INFLAMMATORY ARTHRITIS (HCC): ICD-10-CM

## 2023-07-13 PROBLEM — J96.01 ACUTE RESPIRATORY FAILURE WITH HYPOXIA (HCC): Status: RESOLVED | Noted: 2023-06-03 | Resolved: 2023-07-13

## 2023-07-13 LAB
HBA1C MFR BLD: 6.1 % (ref ?–5.8)
POCT INT CON NEG: NEGATIVE
POCT INT CON POS: POSITIVE

## 2023-07-13 PROCEDURE — 99214 OFFICE O/P EST MOD 30 MIN: CPT | Performed by: STUDENT IN AN ORGANIZED HEALTH CARE EDUCATION/TRAINING PROGRAM

## 2023-07-13 PROCEDURE — 3078F DIAST BP <80 MM HG: CPT | Performed by: STUDENT IN AN ORGANIZED HEALTH CARE EDUCATION/TRAINING PROGRAM

## 2023-07-13 PROCEDURE — 3074F SYST BP LT 130 MM HG: CPT | Performed by: STUDENT IN AN ORGANIZED HEALTH CARE EDUCATION/TRAINING PROGRAM

## 2023-07-13 PROCEDURE — 83036 HEMOGLOBIN GLYCOSYLATED A1C: CPT | Performed by: STUDENT IN AN ORGANIZED HEALTH CARE EDUCATION/TRAINING PROGRAM

## 2023-07-13 RX ORDER — ZOLPIDEM TARTRATE 5 MG/1
5 TABLET ORAL
Qty: 90 TABLET | Refills: 0 | Status: SHIPPED | OUTPATIENT
Start: 2023-07-15 | End: 2023-10-13

## 2023-07-13 ASSESSMENT — FIBROSIS 4 INDEX: FIB4 SCORE: 2.09

## 2023-07-13 NOTE — PROGRESS NOTES
Subjective:     Chief Complaint   Patient presents with    Follow-Up     4 month         HPI:   Iveth presents today with     Acute respiratory failure  Patient hospitalized 6/30/2023 for acute respiratory failure with hypoxia after coming down positive for human metapneumovirus.  Patient no longer needing supplemental oxygen.  Oxygen discontinued at last visit 2 weeks ago.  Patient continues to do well without.    Hypertension  Patient continues on losartan hydrochlorothiazide.  Blood pressure controlled.    GERD  40mg 2x/day    Polymyalgia rheumatica  Patient continues to follow with rheumatology.  Patient has not had recent follow-up.  Rheumatology recommended further evaluating inflammatory arthritis with x-rays and labs.  Patient has a follow-up, states missed last appointment.    Prediabetes  Patient's last A1c 6.7%.  At last visit discussed with patient the importance that she reduce carbohydrates and sugars in diet are also on the medication to treat diabetes.  Patient's A1c today 6.1%.      Insomnia  Patient has been taking Ambien nightly since approximately 1990s.  Patient has been advised of the risk of this medication.  She continues to note insomnia despite taking this medication.  Patient has been advised that this medication is not preferred for insomnia in geriatric patients.  Patient advised of risk of this medication.  Patient notes that she is careful to avoid falls.  Patient will try and take a half dose of the medication when possible to reduce amount.     No aberrant or addictive use of medications has been observed.  No adverse events have been reported.  Patient counseled to keep medications locked up or under personal control.  [facility] board of pharmacy interface is reviewed.  No inconsistencies are found.     Cerumen impaction  Bilateral cerumen impaction.    Cyst  Patient with a cyst in between her breasts.  Patient notes that this has been there for several years.  Unchanged.    ROS:  Gen:  "no fevers/chills, no changes in weight  Eyes: no changes in vision  ENT: no sore throat, no hearing loss, no bloody nose  Pulm: no sob, no cough  CV: no chest pain, no palpitations  GI: no nausea/vomiting, no diarrhea        Objective:     Exam:  /72 (BP Location: Left arm, Patient Position: Sitting, BP Cuff Size: Adult)   Pulse 73   Temp 36.2 °C (97.2 °F) (Temporal)   Resp 16   Ht 1.613 m (5' 3.5\")   Wt 79.4 kg (175 lb)   SpO2 92%   BMI 30.51 kg/m²  Body mass index is 30.51 kg/m².    Gen: Alert and oriented, No apparent distress.  Neck: Neck is supple without lymphadenopathy.  Lungs: Normal effort, CTA bilaterally, no wheezes, rhonchi, or rales  CV: Regular rate and rhythm. No murmurs, rubs, or gallops.  Ext: No clubbing, cyanosis, edema.      Assessment & Plan:     89 y.o. female with the following -     1. Impacted cerumen, unspecified laterality  REMOVAL,CERUMEN,IMPACTED      2. IFG (impaired fasting glucose)  POCT  A1C      3. Insomnia, unspecified type  zolpidem (AMBIEN) 5 MG Tab      4. 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 Polymyalgia rheumatica (HCC)        7. Obesity (BMI 30.0-34.9)        8. Primary hypertension        9. Gastroesophageal reflux disease, unspecified whether esophagitis present                 No follow-ups on file.    Please note that this dictation was created using voice recognition software. I have made every reasonable attempt to correct obvious errors, but I expect that there are errors of grammar and possibly content that I did not discover before finalizing the note.        "

## 2023-07-14 ENCOUNTER — TELEPHONE (OUTPATIENT)
Dept: SCHEDULING | Facility: IMAGING CENTER | Age: 88
End: 2023-07-14

## 2023-07-17 ENCOUNTER — OFFICE VISIT (OUTPATIENT)
Dept: RHEUMATOLOGY | Facility: MEDICAL CENTER | Age: 88
End: 2023-07-17
Attending: STUDENT IN AN ORGANIZED HEALTH CARE EDUCATION/TRAINING PROGRAM
Payer: MEDICARE

## 2023-07-17 VITALS
HEIGHT: 64 IN | WEIGHT: 177 LBS | TEMPERATURE: 97.7 F | BODY MASS INDEX: 30.22 KG/M2 | HEART RATE: 80 BPM | OXYGEN SATURATION: 93 % | SYSTOLIC BLOOD PRESSURE: 118 MMHG | DIASTOLIC BLOOD PRESSURE: 68 MMHG | RESPIRATION RATE: 16 BRPM

## 2023-07-17 DIAGNOSIS — M19.041 PRIMARY OSTEOARTHRITIS OF BOTH HANDS: ICD-10-CM

## 2023-07-17 DIAGNOSIS — M47.819 DEGENERATIVE SPINAL ARTHRITIS: ICD-10-CM

## 2023-07-17 DIAGNOSIS — M75.31 CALCIFIC TENDINITIS OF BOTH SHOULDERS: ICD-10-CM

## 2023-07-17 DIAGNOSIS — M19.042 PRIMARY OSTEOARTHRITIS OF BOTH HANDS: ICD-10-CM

## 2023-07-17 DIAGNOSIS — M06.4 UNDIFFERENTIATED INFLAMMATORY ARTHRITIS (HCC): ICD-10-CM

## 2023-07-17 DIAGNOSIS — M75.32 CALCIFIC TENDINITIS OF BOTH SHOULDERS: ICD-10-CM

## 2023-07-17 PROCEDURE — 3074F SYST BP LT 130 MM HG: CPT | Performed by: STUDENT IN AN ORGANIZED HEALTH CARE EDUCATION/TRAINING PROGRAM

## 2023-07-17 PROCEDURE — 99214 OFFICE O/P EST MOD 30 MIN: CPT | Performed by: STUDENT IN AN ORGANIZED HEALTH CARE EDUCATION/TRAINING PROGRAM

## 2023-07-17 PROCEDURE — 3078F DIAST BP <80 MM HG: CPT | Performed by: STUDENT IN AN ORGANIZED HEALTH CARE EDUCATION/TRAINING PROGRAM

## 2023-07-17 PROCEDURE — 99212 OFFICE O/P EST SF 10 MIN: CPT | Performed by: STUDENT IN AN ORGANIZED HEALTH CARE EDUCATION/TRAINING PROGRAM

## 2023-07-17 RX ORDER — DEXTROMETHORPHAN HYDROBROMIDE AND GUAIFENESIN 20; 400 MG/20ML; MG/20ML
SOLUTION ORAL
COMMUNITY
Start: 2023-05-31 | End: 2024-01-10

## 2023-07-17 ASSESSMENT — RHEUMATOLOGY FOLLOW-UP QUESTIONNAIRE: HELPFUL_MEDICATIONS: STEROIDS

## 2023-07-17 ASSESSMENT — FIBROSIS 4 INDEX: FIB4 SCORE: 2.09

## 2023-07-17 NOTE — PATIENT INSTRUCTIONS
AFTER VISIT INSTRUCTIONS    Below are important information to help you navigate your healthcare needs and help us serve you safely and effectively:  If laboratory tests and/or imaging studies were ordered, remember to go get them done as instructed.  If new prescriptions and/or refills were sent, remember to go pick them up from your local pharmacy, or call the specialty pharmacy to request shipment.  Always take your prescription medications exactly as prescribed unless instructed otherwise.  Note that antirheumatic drugs and steroids are immunosuppressive which means they increase your risk of infections and have multiple potential adverse effects on various organ systems in your body, though most of them are uncommon.  It is important that you are up-to-date on age-appropriate immunizations, particularly shingles and bacterial/viral pneumonia vaccines, which you can request from me or your primary care provider.  Be sure to read the drug package inserts to learn about the potential side effects of your medications before you start taking them.  If you experience any significant drug side effects, stop taking the medication and notify me promptly, and depending on the severity of the side effects, consider going to an urgent care or emergency department for immediate attention.  If there are significant findings on your lab tests and imaging studies that warrant further action, I will notify you with explanations via [facility] or phone call, otherwise you can view them on [facility] and let me know if you have any questions.  Note that [facility] messages are typically read during office hours and may take 1-7 business days before a response depending on the urgency of the situation and how busy my clinic schedule is.  In general, [facility] messaging is for non-urgent matters that do not require immediate attention, so for urgent matters that cannot wait, you are advised to go to an urgent care.  Lastly, you are granted  MyChart access to my documentation of your visit and are encouraged to read my note which details my assessment and plan for your condition.

## 2023-07-17 NOTE — PROGRESS NOTES
[facility] RHEUMATOLOGY  [address] [facility] [address] [facility], [address], [place] [address]  Phone: [phone] ? Fax: [phone]    RHEUMATOLOGY FOLLOW-UP VISIT NOTE      DATE OF SERVICE: 07/17/2023         Subjective     PRIMARY CARE PRACTITIONER:  Belkis Vanegas P.A.-C.  [address] [facility] [address]  [place] [address]    PATIENT IDENTIFICATION:  Iveth Martin  [address] [facility] [place] [address]    YOB: 1933    MEDICAL RECORD NUMBER: [id number]          CHIEF COMPLAINT:   Chief Complaint   Patient presents with    Follow-Up     Undifferentiated inflammatory arthritis (HCC)       RHEUMATOLOGIC HISTORY:  Iveth Martin is a 89 y.o. female with pertinent history notable for polymyalgia rheumatica diagnosed in 10/2021 (reportedly symptomatic since 6/2021 managed by PCP until rheumatology evaluation), calcific tendinopathy of bilateral rotator cuffs, osteoarthritis of hands, left third/fourth trigger fingers s/p surgical releases, carpal tunnel syndrome s/p bilateral surgical releases, DJD/DDD of cervical/thoracic/lumbar spines, osteoporosis in distal left forearm (based on DEXA scan in 10/2022), chronic renal insufficiency, and human metapneumovirus pneumonia in 6/2023 among other comorbidities. Previously under the care of a local rheumatologist who has retired (Dr. Scott Bryant), she initially presented on 10/6/22 to establish care for continued evaluation and management of her condition. Reported waxing/waning joint/muscle pain in her hands (particularly her right fifth DIP joint which was very tender/swollen for a couple of months prior), shoulders, and lower back. These were associated with over 1 hour of morning stiffness that improved with activity, but her joint pain tended to worsen with activity especially in her hands as she is a long-term .     Pertinent treatment history: Prednisone taper 15>>1 mg taper (6/2022-12/2022, helpful), gabapentin 100 mg TID (unclear benefit).    Pertinent laboratory  results: Positive TONY with borderline anti-dsDNA 10 (in 10/2021); negative/normal RF and anti-CCP (in 10/2021), HLA-B27 (in 10/2022), TSH, LFT, and unremarkable CBC in (9/2022), ESR and CRP (in 1/2023).    Pertinent XR imaging (in 4/2023): Shoulders with minimal glenohumeral joint spurs and mildly sclerotic AC joints bilaterally, and soft tissue calcifications consistent with calcific tendinitis on the left. Hips and sacroiliacs with mildly sclerotic joints, and lumbar spine with degenerative changes.      INTERVAL HISTORY:  [facility] Rheumatology Established Patient History Form    7/17/2023  8:37 AM PDT - Filed by Patient   MAIN REASON FOR VISIT check-up, results of tests/x-rays   INTERVAL HISTORY OF ILLNESS   Date of worsening onset: much better   Preceding incident/ailment: PMR, aching joints   Describe/list your symptoms: Aching joints.  Hospitalized in June for human metapneumovirus pneumonia, treated with courses of IV Medrol.  Feeling much better since then.   Exacerbating factors:    Alleviating factors:    Helpful medications: steroids   Ineffective medications:    Severity of pain (scale of 1-10):    Personal/emotional stressors:    Abdirashid All The Areas Of Pain    REVIEW OF SYMPTOMS    General   Fevers    Chills    Night sweats    Malaise    Fatigue    Unintentional weight loss    Musculoskeletal   Joint pain    Morning stiffness duration    Morning stiffness characteristic    Joint swelling    Joint instability    Tendon pain    Muscle pain    Body aches    Dermatologic   Hair loss with bald spots    Hair shedding    Skin thickening    Skin plaques    Sunlight-induced skin rash    Cold-induced color changes (white, purple, red on rewarming)    Neurologic/Psychiatric   Weakness    Spasms    Tingling    Burning    Numbness    Insomnia    Anxiety    Depression    Head/Eyes   Headaches    Temple pain    Dizziness    Dry eyes    Eye pain    Eye redness    Blurry vision    Vision loss    Ears/Nose   Ear pain     Ringing in ears    Vertigo    Hearing loss    Nasal ulcers    Nosebleeds    Sinus pain    Nasal congestion    Snoring    Mouth/Throat   Oral ulcers    Bleeding gums    Dry mouth    Cavities    Sore throat    Sticking in throat    Difficulty speaking    Neck/Lymphatics   Thyroid pain    Thyroid swelling    Lymph node swelling    Cardiac/Respiratory   Chest pain with breathing    Dry cough    Cough with bloody phlegm    Shortness of breath    Fast heartbeats    Irregular heartbeats    Gastrointestinal   Nausea    Vomiting    Difficulty swallowing    Heartburn    Abdominal pain    Bloody stool    Mucus stool    Genitourinary   Pelvic pain    Genital ulcers    Abnormal discharge    Burning urination    Frothy urine    Blood in urine        REVIEW OF SYSTEMS:  Except as noted in the history above, relevant review of systems with emphasis on autoimmune rheumatic diseases was otherwise negative.      ACTIVE PROBLEM LIST:  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    Osteoporosis of left forearm 01/17/2023    Subacute cough 01/03/2023    Other osteoporosis without current pathological fracture 01/03/2023    Long term current use of systemic steroids 10/06/2022    Calcific tendinitis of both shoulders 10/06/2022    Primary osteoarthritis of both hands 10/06/2022    Vitamin D deficiency 12/20/2021    Prediabetes 12/20/2021    NAFLD (nonalcoholic fatty liver disease) 12/20/2021    Hypercholesterolemia 12/20/2021    Elevated antibody levels 12/20/2021    CRP elevated 12/20/2021    Chronic kidney disease, stage 3a (HCC) 12/20/2021    Arthralgia 12/20/2021    Atherosclerosis of aorta (HCC) 12/08/2021    Cerebral atrophy (HCC) 12/08/2021    Obesity (BMI 30.0-34.9) 12/08/2021    Gastroesophageal reflux disease 12/08/2021    Hypertension 12/08/2021     Polymyalgia rheumatica (HCC) 12/08/2021    Chronic bilateral back pain 12/08/2021    Insomnia 12/08/2021    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       PAST MEDICAL HISTORY:  Past Medical History:   Diagnosis Date    Hyperlipidemia     Hypertension        PAST SURGICAL HISTORY:  Past Surgical History:   Procedure Laterality Date    PB WRIST ARTHROSCOP,RELEASE XVERS LIG Left 8/11/2021    Procedure: LEFT ENDOSCOPIC CARPAL TUNNEL RELEASE;  Surgeon: Pee Khan M.D.;  Location: [facility];  Service: Orthopedics    PB INCISE FINGER TENDON SHEATH Left 8/11/2021    Procedure: LEFT LONG AND RING TRIGGER FINGER RELEASE;  Surgeon: Pee Khan M.D.;  Location: [facility];  Service: Orthopedics       MEDICATIONS:  Current Outpatient Medications   Medication Sig    Dextromethorphan-guaiFENesin (MUCINEX FAST-MAX DM MAX) 5-100 MG/5ML Liquid     zolpidem (AMBIEN) 5 MG Tab Take 1 Tablet by mouth at bedtime for 90 days.    albuterol 108 (90 Base) MCG/ACT Aero Soln inhalation aerosol Inhale 2 Puffs every 6 hours as needed for Shortness of Breath.    hydroCHLOROthiazide (HYDRODIURIL) 25 MG Tab Take 25 mg by mouth every morning.    fluticasone (FLOVENT HFA) 110 MCG/ACT Aerosol Inhale 1 Puff 2 times a day.    lovastatin (MEVACOR) 40 MG tablet TAKE 1 TABLET BY MOUTH EVERY DAY IN THE EVENING    methocarbamol (ROBAXIN) 750 MG Tab Take 750 mg by mouth at bedtime as needed (Pain).    gabapentin (NEURONTIN) 100 MG Cap Take 200 mg by mouth 2 times a day.    Cholecalciferol (VITAMIN D3) 2000 UNIT Cap Take 2 Capsules by mouth every day.    chlorpheniramine (CHLOR-TRIMETRON) 4 MG Tab Take 4 mg by mouth every morning. Second dose in the evening PRN    losartan (COZAAR) 50 MG Tab Take 1 Tablet by mouth every day.    omeprazole (PRILOSEC) 40 MG delayed-release capsule Take 1 Capsule by mouth 2 (two) times a day.         ALLERGIES:   Allergies   Allergen Reactions     "Celecoxib Unspecified     GERD reaction    Lisinopril Cough    Nsaids Unspecified     GERD reaction    Other Drug Unspecified     bioxin    Benadryl Allergy Unspecified     \"tongue gets fuzzy and feel like I am drugged\"    Clarithromycin Vomiting       IMMUNIZATIONS:  Immunization History   Administered Date(s) Administered    Influenza Seasonal Injectable - Historical Data 10/15/2013    Influenza Vaccine Adult HD 2014, 2020, 10/05/2022    Influenza Vaccine Pediatric Split - Historical Data 2010    Influenza, Unspecified - HISTORICAL DATA 10/15/2019, 10/21/2021    MODERNA SARS-COV-2 VACCINE (12+) 2021, 2021, 2021    PFIZER BIVALENT BOOSTER SARS-COV-2 VACCINE (12+) 10/05/2022    PFIZER ASTUDILLO CAP SARS-COV-2 VACCINATION (12+) 2022    Pneumococcal Conjugate Vaccine (Prevnar/PCV-13) 2014    Pneumococcal polysaccharide vaccine (PPSV-23) 2018    Tdap Vaccine 2013       SOCIAL HISTORY:   Social History     Socioeconomic History    Marital status:    Tobacco Use    Smoking status: Former     Packs/day: 1.00     Years: 30.00     Pack years: 30.00     Types: Cigarettes     Quit date:      Years since quittin.5    Smokeless tobacco: Never   Vaping Use    Vaping Use: Never used   Substance and Sexual Activity    Alcohol use: Yes     Comment: rare    Drug use: No     Social Determinants of Health     Financial Resource Strain: Low Risk  (10/18/2022)    Overall Financial Resource Strain (CARDIA)     Difficulty of Paying Living Expenses: Not hard at all   Food Insecurity: No Food Insecurity (10/18/2022)    Hunger Vital Sign     Worried About Running Out of Food in the Last Year: Never true     Ran Out of Food in the Last Year: Never true   Transportation Needs: No Transportation Needs (10/18/2022)    PRAPARE - Transportation     Lack of Transportation (Medical): No     Lack of Transportation (Non-Medical): No   Physical Activity: Inactive (10/18/2022)    " "Exercise Vital Sign     Days of Exercise per Week: 0 days     Minutes of Exercise per Session: 0 min   Stress: No Stress Concern Present (10/18/2022)    [employer] - Occupational Stress Questionnaire     Feeling of Stress : Not at all   Social Connections: Moderately Integrated (10/18/2022)    Social Connection and Isolation Panel [NHANES]     Frequency of Communication with Friends and Family: More than three times a week     Frequency of Social Gatherings with Friends and Family: More than three times a week     Attends Mu-ism Services: More than 4 times per year     Active Member of Clubs or Organizations: Yes     Attends Club or Organization Meetings: More than 4 times per year     Marital Status:    Intimate Partner Violence: Not At Risk (10/18/2022)    Humiliation, Afraid, Rape, and Kick questionnaire     Fear of Current or Ex-Partner: No     Emotionally Abused: No     Physically Abused: No     Sexually Abused: No   Housing Stability: Low Risk  (10/18/2022)    Housing Stability Vital Sign     Unable to Pay for Housing in the Last Year: No     Number of Places Lived in the Last Year: 1     Unstable Housing in the Last Year: No       FAMILY HISTORY:  Family History   Problem Relation Age of Onset    Cancer Father     Heart Disease Father             Objective     Vital Signs: /68 (BP Location: Right arm, Patient Position: Sitting, BP Cuff Size: Adult)   Pulse 80   Temp 36.5 °C (97.7 °F) (Temporal)   Resp 16   Ht 1.613 m (5' 3.5\")   Wt 80.3 kg (177 lb)   SpO2 93% Body mass index is 30.86 kg/m².    General: Appears well and comfortable  Eyes: No scleral or conjunctival lesions  ENT: No apparent oral or nasal lesions  Head/Neck: No apparent scalp or neck lesions  Cardiovascular: Regular rate and rhythm  Respiratory: Breathing quiet and unlabored  Gastrointestinal: No organomegaly or abdominal masses  Integumentary: No significant cutaneous lesions or " discolorations  Musculoskeletal: No  Neurologic: No focal sensory or motor deficits  Psychiatric: Mood and affect appropriate      LABORATORY RESULTS REVIEWED AND INTERPRETED BY ME:  Lab Results   Component Value Date/Time    SEDRATEWES 7 01/18/2023 01:09 PM    CREACTPROT <0.30 01/18/2023 01:09 PM     Lab Results   Component Value Date/Time    WOHW42SNPR Negative 10/13/2022 01:10 PM     Lab Results   Component Value Date/Time    TSHULTRASEN 2.070 09/22/2022 07:49 AM     Lab Results   Component Value Date/Time    ASTSGOT 31 06/03/2023 03:47 PM    ALTSGPT 15 06/03/2023 03:47 PM    ALKPHOSPHAT 108 (H) 06/03/2023 03:47 PM    TBILIRUBIN 1.0 06/03/2023 03:47 PM    TOTPROTEIN 6.5 06/03/2023 03:47 PM    ALBUMIN 4.2 06/03/2023 03:47 PM     Lab Results   Component Value Date/Time    SODIUM 132 (L) 06/06/2023 03:07 AM    POTASSIUM 3.4 (L) 06/06/2023 03:07 AM    CHLORIDE 95 (L) 06/06/2023 03:07 AM    CO2 25 06/06/2023 03:07 AM    GLUCOSE 175 (H) 06/06/2023 03:07 AM    BUN 25 (H) 06/06/2023 03:07 AM    CREATININE 0.89 06/06/2023 03:07 AM    CREATININE 0.9 12/12/2007 12:30 PM    BUNCREATRAT 20 10/01/2021 08:17 AM    CALCIUM 9.7 06/06/2023 03:07 AM     Lab Results   Component Value Date/Time    WBC 19.6 (H) 06/06/2023 03:07 AM    RBC 4.66 06/06/2023 03:07 AM    HEMOGLOBIN 13.6 06/06/2023 03:07 AM    HEMATOCRIT 40.5 06/06/2023 03:07 AM    MCV 86.9 06/06/2023 03:07 AM    MCH 29.2 06/06/2023 03:07 AM    MCHC 33.6 06/06/2023 03:07 AM    RDW 45.7 06/06/2023 03:07 AM    PLATELETCT 345 06/06/2023 03:07 AM    MPV 10.6 06/06/2023 03:07 AM    NEUTS 11.88 (H) 06/03/2023 03:47 PM    LYMPHOCYTES 8.00 (L) 06/03/2023 03:47 PM    MONOCYTES 7.40 06/03/2023 03:47 PM    EOSINOPHILS 0.10 06/03/2023 03:47 PM    BASOPHILS 0.20 06/03/2023 03:47 PM    ANISOCYTOSIS 1+ 12/24/2021 12:10 PM     Lab Results   Component Value Date/Time    CHOLSTRLTOT 202 (H) 09/22/2022 07:49 AM     (H) 09/22/2022 07:49 AM    HDL 64 09/22/2022 07:49 AM    TRIGLYCERIDE  187 (H) 09/22/2022 07:49 AM    HBA1C 6.1 (A) 07/13/2023 10:31 AM       RADIOLOGY RESULTS REVIEWED AND INTERPRETED BY ME:  Results for orders placed in visit on 07/13/21    DX-HAND 3+ LEFT    Results for orders placed in visit on 10/06/22    DS-BONE DENSITY STUDY (DEXA)    Impression  According to the World Health Organization classification, bone mineral density of this patient is osteoporotic in the distal left forearm and normal in the proximal left femur.    10-year Probability of Fracture:  Major Osteoporotic     16.8%  Hip     5.3%  Population      [place] ()    Based on left femur neck BMD    Results for orders placed during the hospital encounter of 03/12/13    CT-LSPINE W/O PLUS RECONS    Impression  1. No acute fracture or dislocation lumbar spine.  Multilevel degenerative changes.  2. Grade 1 L4 anterolisthesis.  No definite pars defect.  3. Densities within the lumbar spine and visualized pelvis nonspecific and could be multiple bone islands.  Osteopoikilosis a consideration.  Metastatic disease cannot be excluded.  4. Moderate bulging of the L3 and L4 disks.    Results for orders placed during the hospital encounter of 03/12/13    CT-TSPINE W/O PLUS RECONS    Impression  1. Mild anterior wedging of the sixth thoracic vertebral body that appears to be old.  Correlate clinically.  2. Heights and remainder of the vertebral bodies maintained.  Multilevel degenerative changes.  3. Posterior dome of the right lobe of the liver hypodensities are most likely hemangiomas and/or cysts.    Results for orders placed in visit on 01/08/13    MR-LUMBAR SPINE-W/O    Impression  1. Multilevel degenerative disk disease and facet degeneration which results in varying degrees of central canal and neural foraminal narrowing as specifically described above.    2. Grade 1 anterior subluxation at the L4-5 level.      All relevant laboratory and imaging results reported on this note were reviewed and interpreted by me.          Assessment & Plan     Iveth Martin is a 89 y.o. female with history as noted above whose presentation merits the following diagnostic and clinical status impressions and recommendations:    1. Undifferentiated inflammatory arthritis (HCC)  Clinically low disease activity following hospitalization for viral pneumonia s/p IV steroids in 6/2023. However, still need to complete her work-up with the additional testing noted below for exclusionary, confirmatory and risk stratification purposes.  - TONY reflexive profile, RF, anti-CCP, anti-CarP, ESR and CRP (previously ordered)  - Consider DMARD therapy with methotrexate depending on her clinical trajectory    2. Calcific tendinitis of both shoulders  Presumably the underlying etiology of her bilateral waxing/waning shoulder pain likely with a component of osteoarthritis.  - Consider repeat steroid injection depending on her clinical trajectory over time    3. Primary osteoarthritis of both hands  Presumably a significant etiology of her overall joint pain burden which can be managed with supportively.  - Voltaren 1% gel and Tylenol Arthritis as oral NSAIDs need to be avoided in the setting of her chronic renal insufficiency  - Consider intra-articular steroid injection if that becomes necessary    4. Degenerative spinal arthritis of cervical/thoracic/lumbar  Presumably the etiology of her chronic back pain burden which can be managed supportively.  - Tylenol Arthritis and physical therapy as tolerated  - Consider referral to interventional pain management if that becomes necessary      The above assessment and plan were discussed with the patient who acknowledged understanding of the plan.    FOLLOW-UP: Return in about 3 months (around 10/17/2023) for Short.         Thank you for the opportunity to participate in the care of Iveth Martin.    Don Ulrich MD, MS  Rheumatologist, [facility] Rheumatology ? [facility]  Assistant  Professor of Clinical Medicine, Department of Internal Medicine  [employer]

## 2023-07-18 ENCOUNTER — HOSPITAL ENCOUNTER (OUTPATIENT)
Dept: LAB | Facility: MEDICAL CENTER | Age: 88
End: 2023-07-18
Attending: STUDENT IN AN ORGANIZED HEALTH CARE EDUCATION/TRAINING PROGRAM
Payer: MEDICARE

## 2023-07-18 ENCOUNTER — PATIENT OUTREACH (OUTPATIENT)
Dept: HEALTH INFORMATION MANAGEMENT | Facility: OTHER | Age: 88
End: 2023-07-18
Payer: MEDICARE

## 2023-07-18 DIAGNOSIS — M79.605 BILATERAL LEG PAIN: ICD-10-CM

## 2023-07-18 DIAGNOSIS — M06.4 UNDIFFERENTIATED INFLAMMATORY ARTHRITIS (HCC): ICD-10-CM

## 2023-07-18 DIAGNOSIS — I70.0 ATHEROSCLEROSIS OF AORTA (HCC): ICD-10-CM

## 2023-07-18 DIAGNOSIS — M79.604 BILATERAL LEG PAIN: ICD-10-CM

## 2023-07-18 DIAGNOSIS — N18.31 CHRONIC KIDNEY DISEASE, STAGE 3A: ICD-10-CM

## 2023-07-18 DIAGNOSIS — I10 HYPERTENSION, UNSPECIFIED TYPE: ICD-10-CM

## 2023-07-18 PROCEDURE — 86140 C-REACTIVE PROTEIN: CPT

## 2023-07-18 PROCEDURE — 86200 CCP ANTIBODY: CPT

## 2023-07-18 PROCEDURE — 86038 ANTINUCLEAR ANTIBODIES: CPT

## 2023-07-18 PROCEDURE — 36415 COLL VENOUS BLD VENIPUNCTURE: CPT

## 2023-07-18 PROCEDURE — 85652 RBC SED RATE AUTOMATED: CPT

## 2023-07-18 PROCEDURE — 83516 IMMUNOASSAY NONANTIBODY: CPT

## 2023-07-18 PROCEDURE — 99490 CHRNC CARE MGMT STAFF 1ST 20: CPT | Performed by: STUDENT IN AN ORGANIZED HEALTH CARE EDUCATION/TRAINING PROGRAM

## 2023-07-18 PROCEDURE — 86431 RHEUMATOID FACTOR QUANT: CPT

## 2023-07-18 NOTE — PROGRESS NOTES
Assessment  Spoke with Iveth for monthly outreach follow up. Iveth states she is doing well. Discussed follow up appointments and recent lab work. Discussed making an appointment at the [facility] for her handicap sticker. Iveth states her daughter is coming to visit. She is looking forward to the visit. She is staying in due to the summer heat. She does her errands before 10:00 in the morning. Iveth denied any questions or concerns. Encouraged to call with any needs.     Education  Discussed recent lab work   Discussed follow up appointments     Plan of Care and Goals  Continue to regular exercise     Barriers:  Age   Knowledge of healthcare     Progress:  Progressing    Next outreach:  1 month       Chart reviewed for Quarterly Assessment, patient continues to qualify and benefit from PCM program.

## 2023-07-19 LAB
CRP SERPL HS-MCNC: <0.3 MG/DL (ref 0–0.75)
ERYTHROCYTE [SEDIMENTATION RATE] IN BLOOD BY WESTERGREN METHOD: 5 MM/HOUR (ref 0–25)
RHEUMATOID FACT SER IA-ACNC: 10 IU/ML (ref 0–14)

## 2023-07-20 LAB
CARBAMYLATED PROTEIN ANTIBODY, IGG Q6043: 2 UNITS (ref 0–19)
CCP IGG SERPL-ACNC: 2 UNITS (ref 0–19)
NUCLEAR IGG SER QL IA: NORMAL

## 2023-08-16 ENCOUNTER — PATIENT OUTREACH (OUTPATIENT)
Dept: HEALTH INFORMATION MANAGEMENT | Facility: OTHER | Age: 88
End: 2023-08-16
Payer: MEDICARE

## 2023-08-16 DIAGNOSIS — M79.604 BILATERAL LEG PAIN: ICD-10-CM

## 2023-08-16 DIAGNOSIS — M79.605 BILATERAL LEG PAIN: ICD-10-CM

## 2023-08-16 DIAGNOSIS — N18.31 CHRONIC KIDNEY DISEASE, STAGE 3A: ICD-10-CM

## 2023-08-16 DIAGNOSIS — I70.0 ATHEROSCLEROSIS OF AORTA (HCC): ICD-10-CM

## 2023-08-16 DIAGNOSIS — I10 HYPERTENSION, UNSPECIFIED TYPE: ICD-10-CM

## 2023-08-16 PROCEDURE — 99490 CHRNC CARE MGMT STAFF 1ST 20: CPT | Performed by: STUDENT IN AN ORGANIZED HEALTH CARE EDUCATION/TRAINING PROGRAM

## 2023-08-16 NOTE — PROGRESS NOTES
Assessment  Iveth called with medication questions, completed monthly outreach as well. Iveth states she is doing well. She has a question about continuing her Flovent inhaler. Discussed Flovent and albuterol inhalers. She would like a message sent to her PCP asking if she still needs to take the Flovent. Will follow up. Her Handicap sticker is temporary and expires in December. She would like a yearly renewable handicap endorsement for [employer]. Will continue to follow. Discussed her current mobility. A walker was recommended by Dr. Tamez of pain management. Iveth states she is currently doing well without the walker but has difficulty with long distances. Iveth denies any other questions or concerns. Will assist in follow up of her Flovent and handicap sticker. Encouraged to call with any other needs.     Education  Discussed handicap endorsement at [facility]  Discussed Flovent and albuterol     Plan of Care and Goals  Continue to stay active    Barriers:  Age  Progression of diease process  Knowledge of healthcare     Progress:  Progressing     Next outreach:  1 month

## 2023-08-25 ENCOUNTER — TELEPHONE (OUTPATIENT)
Dept: HEALTH INFORMATION MANAGEMENT | Facility: OTHER | Age: 88
End: 2023-08-25
Payer: MEDICARE

## 2023-08-25 NOTE — TELEPHONE ENCOUNTER
Iveth called with questions regarding a call she received from [facility] for a medication review. Iveth wasn't sure what this call was about.   Called [facility] patient care representative to confirm information.   Returned Iveth's call, no answer left message.

## 2023-09-11 ENCOUNTER — PATIENT OUTREACH (OUTPATIENT)
Dept: HEALTH INFORMATION MANAGEMENT | Facility: OTHER | Age: 88
End: 2023-09-11
Payer: MEDICARE

## 2023-09-11 DIAGNOSIS — I70.0 ATHEROSCLEROSIS OF AORTA (HCC): ICD-10-CM

## 2023-09-11 DIAGNOSIS — M79.604 BILATERAL LEG PAIN: ICD-10-CM

## 2023-09-11 DIAGNOSIS — N18.31 CHRONIC KIDNEY DISEASE, STAGE 3A: ICD-10-CM

## 2023-09-11 DIAGNOSIS — I10 HYPERTENSION, UNSPECIFIED TYPE: ICD-10-CM

## 2023-09-11 DIAGNOSIS — M79.605 BILATERAL LEG PAIN: ICD-10-CM

## 2023-09-11 PROCEDURE — 99999 PR NO CHARGE: CPT | Performed by: STUDENT IN AN ORGANIZED HEALTH CARE EDUCATION/TRAINING PROGRAM

## 2023-09-11 NOTE — PROGRESS NOTES
Assessment  Spoke with Iveth for monthly outreach follow up. Iveth states that she is doing well. She has continued to use her Flovent inhaler one puff twice daily. She continues to use her albuterol inhaler as needed, generally 1-2 times daily. She would like to have the DMV from for a permanent handicap sticker mailed to her. She denied any questions or concerns. Encouraged to call with any needs     Education  Discussed the use of Albuterol and Flovent inhalers     Plan of Care and Goals  Continue to stay active     Barriers:  Age  Knowledge of Healthcare  Habits     Progress:  Progressing     Next outreach:  1 month

## 2023-09-14 ENCOUNTER — TELEPHONE (OUTPATIENT)
Dept: HEALTH INFORMATION MANAGEMENT | Facility: OTHER | Age: 88
End: 2023-09-14
Payer: MEDICARE

## 2023-09-14 NOTE — TELEPHONE ENCOUNTER
Iveth left a message with questions regarding vaccinations. Returned call. Iveth states she is scheduled for Flu, COVID, and RSV vaccinations at [facility]. She questioned the RSV vaccine. Discussed risks for RVS. She states she will get the vaccine. Also informed Iveth that her DMV from was mailed out yesterday.

## 2023-09-18 ENCOUNTER — TELEPHONE (OUTPATIENT)
Dept: HEALTH INFORMATION MANAGEMENT | Facility: OTHER | Age: 88
End: 2023-09-18
Payer: MEDICARE

## 2023-09-18 NOTE — TELEPHONE ENCOUNTER
Iveth left a message, Returned call. She reports she received the DMV paperwork for her handicap sticker. She recieved her flu and RSV vaccinations today and is feeling fatigued with a mild headache. Dicussed common vaccination side effect. Iveth stated she would call back if her symptoms increased or did not resolve. Encouraged to call with any needs

## 2023-09-25 ENCOUNTER — TELEPHONE (OUTPATIENT)
Dept: HEALTH INFORMATION MANAGEMENT | Facility: OTHER | Age: 88
End: 2023-09-25
Payer: MEDICARE

## 2023-09-25 NOTE — TELEPHONE ENCOUNTER
Iveth called reporting she tested positive for COVID using a home test kit. She reports her symptoms likely started last Thursday the 21st. She states she is starting to feel better today. Her appetite is improving. She denies any shortness of breath or difficulty breathing. She states she does not feel as though she needs an appointment or to seek emergency medical care. Does feel improved today. Encouraged Iveth not to hesitate to call 911 for any shortness of breath or difficulty breathing.

## 2023-10-02 ENCOUNTER — PATIENT OUTREACH (OUTPATIENT)
Dept: HEALTH INFORMATION MANAGEMENT | Facility: OTHER | Age: 88
End: 2023-10-02
Payer: MEDICARE

## 2023-10-02 ENCOUNTER — PATIENT MESSAGE (OUTPATIENT)
Dept: HEALTH INFORMATION MANAGEMENT | Facility: OTHER | Age: 88
End: 2023-10-02

## 2023-10-02 ENCOUNTER — TELEPHONE (OUTPATIENT)
Dept: HEALTH INFORMATION MANAGEMENT | Facility: OTHER | Age: 88
End: 2023-10-02
Payer: MEDICARE

## 2023-10-02 DIAGNOSIS — N18.31 CHRONIC KIDNEY DISEASE, STAGE 3A: ICD-10-CM

## 2023-10-02 DIAGNOSIS — M79.604 BILATERAL LEG PAIN: ICD-10-CM

## 2023-10-02 DIAGNOSIS — I10 HYPERTENSION, UNSPECIFIED TYPE: ICD-10-CM

## 2023-10-02 DIAGNOSIS — M79.605 BILATERAL LEG PAIN: ICD-10-CM

## 2023-10-02 DIAGNOSIS — I70.0 ATHEROSCLEROSIS OF AORTA (HCC): ICD-10-CM

## 2023-10-02 PROCEDURE — 99999 PR NO CHARGE: CPT | Performed by: STUDENT IN AN ORGANIZED HEALTH CARE EDUCATION/TRAINING PROGRAM

## 2023-10-02 NOTE — PROGRESS NOTES
Assessment  Iveth called and left a message with COVID questions. Returned call and completed monthly outreach follow up. Iveth states she is feeling better. She no longer has a fever. Reports some mild congestions. She tested positive on 9/25. Iveth is asking how long to isolate. Discussed isolating for 5 full days from the time of symptom onset or your positive test, that symptoms should have resolved or significantly improved and she should not have a fever for the previous 24 hours (without using fever reducing medication such as Tylenol, aspirin, ibuprofen).  Iveth denied any other needs. Encouraged to call with any questions or concerns.     Education  Discussed COVID-sent [employer] message with COVID information    Plan of Care and Goals  Continue to stay active     Barriers:  Age  Knowledge of Healthcare  Progression of disease process     Progress:  Progressing     Next outreach:  1 month     Chart reviewed for Quarterly Assessment, patient continues to qualify and benefit from PCM program.

## 2023-10-04 ENCOUNTER — TELEPHONE (OUTPATIENT)
Dept: HEALTH INFORMATION MANAGEMENT | Facility: OTHER | Age: 88
End: 2023-10-04
Payer: MEDICARE

## 2023-10-04 NOTE — TELEPHONE ENCOUNTER
Iveth called and left a message that she was coughing and needed assistance making a PCP appointment. Returned call. Iveth reports she is coughing so much she is having trouble taking catching her breath. She states she is not bad enough to go to the ER. She reports she has her nebulizer at home. She would like an PCP appointment tomorrow. There is an available appointment on 10/6 at 7:00 with Dr. Sosa. Iveth asked to take the appointment. She states she does not want to go to urgent care. Discussed with Iveth not to hesitate to call 911 if she has any difficultly breathing. She verbalized understanding.

## 2023-10-06 ENCOUNTER — OFFICE VISIT (OUTPATIENT)
Dept: MEDICAL GROUP | Facility: MEDICAL CENTER | Age: 88
End: 2023-10-06
Payer: MEDICARE

## 2023-10-06 ENCOUNTER — HOSPITAL ENCOUNTER (OUTPATIENT)
Dept: RADIOLOGY | Facility: MEDICAL CENTER | Age: 88
End: 2023-10-06
Attending: FAMILY MEDICINE
Payer: MEDICARE

## 2023-10-06 VITALS
DIASTOLIC BLOOD PRESSURE: 60 MMHG | HEART RATE: 82 BPM | TEMPERATURE: 97.5 F | RESPIRATION RATE: 16 BRPM | HEIGHT: 64 IN | OXYGEN SATURATION: 95 % | BODY MASS INDEX: 30.39 KG/M2 | WEIGHT: 178 LBS | SYSTOLIC BLOOD PRESSURE: 110 MMHG

## 2023-10-06 DIAGNOSIS — R05.9 COUGH, UNSPECIFIED TYPE: ICD-10-CM

## 2023-10-06 LAB
FLUAV RNA SPEC QL NAA+PROBE: NEGATIVE
FLUBV RNA SPEC QL NAA+PROBE: NEGATIVE
RSV RNA SPEC QL NAA+PROBE: NEGATIVE
SARS-COV-2 RNA RESP QL NAA+PROBE: POSITIVE

## 2023-10-06 PROCEDURE — 71046 X-RAY EXAM CHEST 2 VIEWS: CPT

## 2023-10-06 PROCEDURE — 3074F SYST BP LT 130 MM HG: CPT | Performed by: FAMILY MEDICINE

## 2023-10-06 PROCEDURE — 0241U POCT CEPHEID COV-2, FLU A/B, RSV - PCR: CPT | Performed by: FAMILY MEDICINE

## 2023-10-06 PROCEDURE — 99213 OFFICE O/P EST LOW 20 MIN: CPT | Performed by: FAMILY MEDICINE

## 2023-10-06 PROCEDURE — 3078F DIAST BP <80 MM HG: CPT | Performed by: FAMILY MEDICINE

## 2023-10-06 RX ORDER — AMOXICILLIN AND CLAVULANATE POTASSIUM 875; 125 MG/1; MG/1
1 TABLET, FILM COATED ORAL 2 TIMES DAILY
Qty: 14 TABLET | Refills: 0 | Status: SHIPPED | OUTPATIENT
Start: 2023-10-06 | End: 2023-10-17

## 2023-10-06 RX ORDER — LOSARTAN POTASSIUM 50 MG/1
1 TABLET ORAL
COMMUNITY
Start: 2023-08-21 | End: 2023-10-16 | Stop reason: SDUPTHER

## 2023-10-06 RX ORDER — PREDNISONE 20 MG/1
20 TABLET ORAL DAILY
Qty: 5 TABLET | Refills: 0 | Status: SHIPPED | OUTPATIENT
Start: 2023-10-06 | End: 2023-10-17 | Stop reason: SDUPTHER

## 2023-10-06 ASSESSMENT — FIBROSIS 4 INDEX: FIB4 SCORE: 2.09

## 2023-10-06 NOTE — PROGRESS NOTES
CC: Cough    HPI:   Iveth presents today because she has been having intermittent cough with phlegm for about 2 weeks.  She was tested positive for COVID about 12 days ago, and was treated conservatively, was not given Paxlovid because she was out of the allowedperoid in which she should get the medication (was more than 5 days after the symptoms when she was tested positive).  However patient stated that the symptoms gets better a little bit but it comes back again.  Denies any fever or chills.  But the cough sometimes associated with yellow thick phlegm.  Patient has no history of COPD, last pulmonary function test was normal.      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    Osteoporosis of left forearm 01/17/2023    Subacute cough 01/03/2023    Other osteoporosis without current pathological fracture 01/03/2023    Long term current use of systemic steroids 10/06/2022    Calcific tendinitis of both shoulders 10/06/2022    Primary osteoarthritis of both hands 10/06/2022    Vitamin D deficiency 12/20/2021    Prediabetes 12/20/2021    NAFLD (nonalcoholic fatty liver disease) 12/20/2021    Hypercholesterolemia 12/20/2021    Elevated antibody levels 12/20/2021    CRP elevated 12/20/2021    Chronic kidney disease, stage 3a (HCC) 12/20/2021    Arthralgia 12/20/2021    Atherosclerosis of aorta (HCC) 12/08/2021    Cerebral atrophy ([facility]) 12/08/2021    Obesity (BMI 30.0-34.9) 12/08/2021    Gastroesophageal reflux disease 12/08/2021    Hypertension 12/08/2021    Polymyalgia rheumatica (HCC) 12/08/2021    Chronic bilateral back pain 12/08/2021    Insomnia 12/08/2021    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      albuterol 108 (90 Base) MCG/ACT Aero Soln inhalation aerosol INHALE 2 PUFFS BY MOUTH EVERY 6 HOURS AS NEEDED FOR SHORTNESS OF BREATH 8.5 Each 1    Dextromethorphan-guaiFENesin (MUCINEX FAST-MAX DM MAX) 5-100 MG/5ML Liquid       zolpidem (AMBIEN) 5 MG Tab Take 1 Tablet by mouth at bedtime for 90 days. 90 Tablet 0    hydroCHLOROthiazide (HYDRODIURIL) 25 MG Tab Take 25 mg by mouth every morning.      fluticasone (FLOVENT HFA) 110 MCG/ACT Aerosol Inhale 1 Puff 2 times a day. 12 g 3    lovastatin (MEVACOR) 40 MG tablet TAKE 1 TABLET BY MOUTH EVERY DAY IN THE EVENING 100 Tablet 3    methocarbamol (ROBAXIN) 750 MG Tab Take 750 mg by mouth at bedtime as needed (Pain).      gabapentin (NEURONTIN) 100 MG Cap Take 200 mg by mouth 2 times a day. 2 tabs in the morning and 3 tabs at night      Cholecalciferol (VITAMIN D3) 2000 UNIT Cap Take 2 Capsules by mouth every day.      chlorpheniramine (CHLOR-TRIMETRON) 4 MG Tab Take 4 mg by mouth every morning. Second dose in the evening PRN      losartan (COZAAR) 50 MG Tab Take 1 Tablet by mouth every day.      omeprazole (PRILOSEC) 40 MG delayed-release capsule Take 1 Capsule by mouth 2 (two) times a day.         No current facility-administered medications for this visit.         Allergies as of 10/06/2023 - Reviewed 10/06/2023   Allergen Reaction Noted    Celecoxib Unspecified 03/12/2013    Lisinopril Cough 03/12/2013    Nsaids Unspecified 07/19/2021    Other drug Unspecified 03/12/2013    Benadryl allergy Unspecified 03/12/2013    Clarithromycin Vomiting 01/03/2023        ROS: Denies any chest pain, Shortness of breath, Changes bowel or bladder, Lower extremity edema.    Physical Exam:  /60 (BP Location: Right arm, Patient Position: Sitting, BP Cuff Size: Adult)   Pulse 82   Temp 36.4 °C (97.5 °F) (Temporal)   Resp 16   Ht 1.613 m (5' 3.5\")   Wt 80.7 kg (178 lb)   SpO2 95%   BMI 31.04 kg/m²   Gen.: Well-developed, well-nourished, no apparent " distress,pleasant and cooperative with the examination  Skin:  Warm and dry with good turgor. No rashes or suspicious lesions in visible areas  HEENT:Sinuses nontender with palpation, TMs clear, nares patent with pink mucosa and clear rhinorrhea,no septal deviation ,polyps or lesions. lips without lesions, oropharynx clear.  Neck: Trachea midline,no masses or adenopathy. No JVD.  Cor: Regular rate and rhythm without murmur, gallop or rub.  Lungs: Respirations unlabored.slightly decreased  breath sounds bilaterally. No wheezes, mild crackles.  Extremities: No cyanosis, clubbing or edema.          Assessment and Plan.   89 y.o. female     1. Cough, unspecified type  POCT RSV and flu test are negative, but COVID test is positive which is expected.  Patient was tested positive about 12 days ago.  However patient advised to hydrate    - DX-CHEST-2 VIEWS; Future  - POCT CoV-2, Flu A/B, RSV by PCR  - predniSONE (DELTASONE) 20 MG Tab; Take 1 Tablet by mouth every day.  Dispense: 5 Tablet; Refill: 0  - amoxicillin-clavulanate (AUGMENTIN) 875-125 MG Tab; Take 1 Tablet by mouth 2 times a day.  Dispense: 14 Tablet; Refill: 0

## 2023-10-09 ENCOUNTER — TELEPHONE (OUTPATIENT)
Dept: HEALTH INFORMATION MANAGEMENT | Facility: OTHER | Age: 88
End: 2023-10-09
Payer: MEDICARE

## 2023-10-09 NOTE — TELEPHONE ENCOUNTER
Iveth called with questions regarding her recent COVID infection. She states she is feeling better. Her questions were regarding low long is his contagious. Discussed isolating 5 full days from onset of symptoms and being fever free for 24hrs without fever reducing medications. Iveth verbalized understanding. Encouraged to call with any questions or concerns.

## 2023-10-14 ASSESSMENT — RHEUMATOLOGY FOLLOW-UP QUESTIONNAIRE
CHARACTERISTIC: SAME WITH ACTIVITY
HELPFUL_MEDICATIONS: PREDNISONE
MARK ALL THE AREAS OF PAIN: 92030487
JOINT PAIN: SAME WITH ACTIVITY
DRY EYES: Y
EXACERBATING_FACTORS: ?
RATE_1TO10: 5
ALLEVIATING_FACTORS: MEDICATION

## 2023-10-16 DIAGNOSIS — G47.00 INSOMNIA, UNSPECIFIED TYPE: ICD-10-CM

## 2023-10-16 RX ORDER — HYDROCHLOROTHIAZIDE 25 MG/1
25 TABLET ORAL EVERY MORNING
Qty: 100 TABLET | Refills: 3 | Status: SHIPPED | OUTPATIENT
Start: 2023-10-16

## 2023-10-16 RX ORDER — ZOLPIDEM TARTRATE 5 MG/1
5 TABLET ORAL
Qty: 90 TABLET | Refills: 0 | OUTPATIENT
Start: 2023-10-16 | End: 2024-01-14

## 2023-10-16 RX ORDER — LOSARTAN POTASSIUM 50 MG/1
50 TABLET ORAL
Qty: 100 TABLET | Refills: 3 | Status: SHIPPED | OUTPATIENT
Start: 2023-10-16

## 2023-10-16 NOTE — TELEPHONE ENCOUNTER
Caller Name: Iveth Martin   Call Back Number: [phone]     Pt called and LVM stating she does not know why her Hydrochlorothiazide has been discontinued but she take both losartan and Hydrochlorothiazide for her BP. Pt also requesting a refill of Ambien.

## 2023-10-17 ENCOUNTER — OFFICE VISIT (OUTPATIENT)
Dept: RHEUMATOLOGY | Facility: MEDICAL CENTER | Age: 88
End: 2023-10-17
Attending: STUDENT IN AN ORGANIZED HEALTH CARE EDUCATION/TRAINING PROGRAM
Payer: MEDICARE

## 2023-10-17 VITALS
DIASTOLIC BLOOD PRESSURE: 64 MMHG | OXYGEN SATURATION: 90 % | SYSTOLIC BLOOD PRESSURE: 128 MMHG | HEART RATE: 94 BPM | BODY MASS INDEX: 30.56 KG/M2 | WEIGHT: 179 LBS | HEIGHT: 64 IN | TEMPERATURE: 96.9 F

## 2023-10-17 DIAGNOSIS — M75.32 CALCIFIC TENDINITIS OF BOTH SHOULDERS: ICD-10-CM

## 2023-10-17 DIAGNOSIS — M19.041 PRIMARY OSTEOARTHRITIS OF BOTH HANDS: ICD-10-CM

## 2023-10-17 DIAGNOSIS — M70.61 TROCHANTERIC BURSITIS OF BOTH HIPS: ICD-10-CM

## 2023-10-17 DIAGNOSIS — M75.31 CALCIFIC TENDINITIS OF BOTH SHOULDERS: ICD-10-CM

## 2023-10-17 DIAGNOSIS — M35.3 POLYMYALGIA RHEUMATICA (HCC): ICD-10-CM

## 2023-10-17 DIAGNOSIS — M70.62 TROCHANTERIC BURSITIS OF BOTH HIPS: ICD-10-CM

## 2023-10-17 DIAGNOSIS — M19.042 PRIMARY OSTEOARTHRITIS OF BOTH HANDS: ICD-10-CM

## 2023-10-17 PROCEDURE — 3078F DIAST BP <80 MM HG: CPT | Performed by: STUDENT IN AN ORGANIZED HEALTH CARE EDUCATION/TRAINING PROGRAM

## 2023-10-17 PROCEDURE — 99212 OFFICE O/P EST SF 10 MIN: CPT | Performed by: STUDENT IN AN ORGANIZED HEALTH CARE EDUCATION/TRAINING PROGRAM

## 2023-10-17 PROCEDURE — 99214 OFFICE O/P EST MOD 30 MIN: CPT | Performed by: STUDENT IN AN ORGANIZED HEALTH CARE EDUCATION/TRAINING PROGRAM

## 2023-10-17 PROCEDURE — 3074F SYST BP LT 130 MM HG: CPT | Performed by: STUDENT IN AN ORGANIZED HEALTH CARE EDUCATION/TRAINING PROGRAM

## 2023-10-17 RX ORDER — PREDNISONE 1 MG/1
TABLET ORAL
Qty: 180 TABLET | Refills: 3 | Status: SHIPPED | OUTPATIENT
Start: 2023-10-17

## 2023-10-17 ASSESSMENT — FIBROSIS 4 INDEX: FIB4 SCORE: 2.09

## 2023-10-17 NOTE — PATIENT INSTRUCTIONS
AFTER VISIT INSTRUCTIONS    Below are important information to help you navigate your healthcare needs and help us serve you safely and effectively:  If laboratory tests and/or imaging studies were ordered, remember to go get them done as instructed.  If new prescriptions and/or refills were sent, remember to go pick them up from your local pharmacy, or call the specialty pharmacy to request shipment.  Always take your prescription medications exactly as prescribed unless instructed otherwise.  Note that antirheumatic drugs and steroids are immunosuppressive which means they increase your risk of infections and have multiple potential adverse effects on various organ systems in your body, though most of them are uncommon.  It is important that you are up-to-date on age-appropriate immunizations, particularly shingles and bacterial/viral pneumonia vaccines, which you can request from me or your primary care provider.  Be sure to read the drug package inserts to learn about the potential side effects of your medications before you start taking them.  If you experience any significant drug side effects, stop taking the medication and notify me promptly, and depending on the severity of the side effects, consider going to an urgent care or emergency department for immediate attention.  If there are significant findings on your lab tests and imaging studies that warrant further action, I will notify you with explanations via [employer] or phone call, otherwise you can view them on [employer] and let me know if you have any questions.  Note that [employer] messages are typically read during office hours and may take 1-7 business days before a response depending on the urgency of the situation and how busy my clinic schedule is.  In general, M[employer] messaging is for non-urgent matters that do not require immediate attention, so for urgent matters that cannot wait, you are advised to go to an urgent care.  Lastly, you are granted  [employer] access to my documentation of your visit and are encouraged to read my note which details my assessment and plan for your condition.  To learn more about your condition and rheumatic diseases evaluated and treated by rheumatologists, as well as gain access to many helpful resources about these diseases, visit our website at [email or web address].

## 2023-10-17 NOTE — PROGRESS NOTES
[facility] RHEUMATOLOGY  [address], [address], [place] [address]  Phone: [phone] ? Fax: [phone]  Website: [facility]/Health-Services/Rheumatology    RHEUMATOLOGY FOLLOW-UP VISIT NOTE      DATE OF SERVICE: 10/17/2023         Subjective     PRIMARY CARE PRACTITIONER:  Belkis Vanegas P.A.-C.  [address]  [facility] [address]    PATIENT IDENTIFICATION:  Iveth Martin  [address] [address]    YOB: 1933    MEDICAL RECORD NUMBER: [id number]          CHIEF COMPLAINT:   Chief Complaint   Patient presents with    Follow-Up     Polymyalgia rheumatica       RHEUMATOLOGIC HISTORY:  Iveth Martin is a 89 y.o. 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 Previously under the care of a local rheumatologist who has retired (Dr. Scott Bryant), she initially presented on 10/6/22 to establish care for continued evaluation and management of her condition. Reported waxing/waning joint/muscle pain in her hands (particularly her right fifth DIP joint which was very tender/swollen for a couple of months prior), shoulders, and lower back. These were associated with over 1 hour of morning stiffness that improved with activity, but her joint pain tended to worsen with activity especially in her hands as she is a long-term .      Pertinent treatment history: Prednisone taper 15>>1-2 mg daily (6/2022-12/2022, restarted 10/17/23-present, effective), gabapentin 100 mg TID (unclear benefit).     Pertinent positive labs: TONY with borderline anti-dsDNA 10 (in 10/2021).    Pertinent negative labs: TONY, RF, anti-CCP, anti-CarP, CRP and ESR (in 7/2023), HLA-B27 (in 10/2022), TSH, LFT, and unremarkable CBC in (9/2022).     Pertinent XR imaging: Shoulders (in 4/2023) with minimal glenohumeral joint spurs and mildly sclerotic AC joints bilaterally, and soft tissue calcifications consistent with calcific tendinitis on the left. Hips/pelvis (in 4/2023) with mildly sclerotic hip and sacroiliac joints, and lower lumbar spine with degenerative changes.      INTERVAL HISTORY:  Reports interval history as noted on the questionnaire below or scanned under media tab.  [facility] Rheumatology Established Patient History Form    10/14/2023 10:15 AM PDT - Filed by Patient   MAIN REASON FOR VISIT    INTERVAL HISTORY OF ILLNESS   Date of worsening onset: July.  Prednisone, Rx'd for respiratory infection, out of system.   Preceding incident/ailment: PRM (2021);  respiratory infection (6/23)   Describe/list your symptoms: Aching and stiffness in hand joints;  aching and stiffness in rotator cuffs.   Exacerbating factors: ?   Alleviating factors: Medication   Helpful medications: Prednisone, Voltaren   Ineffective medications: Unable to take Nsaids   Severity of pain (scale of 1-10): 5   Personal/emotional stressors: Difficulty with playing the piano   Abdirashid All The Areas Of Pain    REVIEW OF SYMPTOMS    General   Fevers    Chills    Night sweats    Malaise    Fatigue    Unintentional weight loss    Musculoskeletal   Joint pain Same with activity   Morning stiffness duration    Morning stiffness characteristic Same with activity   Joint swelling    Joint instability    Tendon pain    Muscle pain    Body aches    Dermatologic   Hair loss with bald spots    Hair shedding     Skin thickening    Skin plaques    Sunlight-induced skin rash    Cold-induced color changes (white, purple, red on rewarming)    Neurologic/Psychiatric   Weakness    Spasms    Tingling    Burning    Numbness    Insomnia    Anxiety    Depression    Head/Eyes   Headaches    Temple pain    Dizziness    Dry eyes Yes   Eye pain    Eye redness    Blurry vision    Vision loss    Ears/Nose   Ear pain    Ringing in ears    Vertigo    Hearing loss    Nasal ulcers    Nosebleeds    Sinus pain    Nasal congestion    Snoring    Mouth/Throat   Oral ulcers    Bleeding gums    Dry mouth    Cavities    Sore throat    Sticking in throat    Difficulty speaking    Neck/Lymphatics   Thyroid pain    Thyroid swelling    Lymph node swelling    Cardiac/Respiratory   Chest pain with breathing    Dry cough    Cough with bloody phlegm    Shortness of breath    Fast heartbeats    Irregular heartbeats    Gastrointestinal   Nausea    Vomiting    Difficulty swallowing    Heartburn    Abdominal pain    Bloody stool    Mucus stool    Genitourinary   Pelvic pain    Genital ulcers    Abnormal discharge    Burning urination    Frothy urine    Blood in urine        REVIEW OF SYSTEMS:  Except as noted in the history above, relevant review of systems with emphasis on autoimmune rheumatic diseases was otherwise negative.      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    Osteoporosis of left forearm 01/17/2023    Subacute cough 01/03/2023    Other osteoporosis without current pathological fracture 01/03/2023    Long term current use of systemic steroids 10/06/2022    Calcific tendinitis of both shoulders 10/06/2022    Primary osteoarthritis of both hands 10/06/2022    Vitamin D deficiency  12/20/2021    Prediabetes 12/20/2021    NAFLD (nonalcoholic fatty liver disease) 12/20/2021    Hypercholesterolemia 12/20/2021    Elevated antibody levels 12/20/2021    CRP elevated 12/20/2021    Chronic kidney disease, stage 3a (HCC) 12/20/2021    Arthralgia 12/20/2021    Atherosclerosis of aorta (HCC) 12/08/2021    Cerebral atrophy (HCC) 12/08/2021    Obesity (BMI 30.0-34.9) 12/08/2021    Gastroesophageal reflux disease 12/08/2021    Hypertension 12/08/2021    Polymyalgia rheumatica (HCC) 12/08/2021    Chronic bilateral back pain 12/08/2021    Insomnia 12/08/2021    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       PAST MEDICAL HISTORY:  Past Medical History:   Diagnosis Date    Hyperlipidemia     Hypertension        PAST SURGICAL HISTORY:  Past Surgical History:   Procedure Laterality Date    PB WRIST ARTHROSCOP,RELEASE XVERS LIG Left 8/11/2021    Procedure: LEFT ENDOSCOPIC CARPAL TUNNEL RELEASE;  Surgeon: Pee Khan M.D.;  Location: [facility];  Service: Orthopedics    PB INCISE FINGER TENDON SHEATH Left 8/11/2021    Procedure: LEFT LONG AND RING TRIGGER FINGER RELEASE;  Surgeon: Pee Khan M.D.;  Location: [facility];  Service: Orthopedics       MEDICATIONS:  Current Outpatient Medications   Medication Sig    predniSONE (DELTASONE) 1 MG Tab Take 1-2 tablets daily as needed. Take with food.    losartan (COZAAR) 50 MG Tab Take 1 Tablet by mouth every day.    hydroCHLOROthiazide (HYDRODIURIL) 25 MG Tab Take 1 Tablet by mouth every morning.    albuterol 108 (90 Base) MCG/ACT Aero Soln inhalation aerosol INHALE 2 PUFFS BY MOUTH EVERY 6 HOURS AS NEEDED FOR SHORTNESS OF BREATH    Dextromethorphan-guaiFENesin (MUCINEX FAST-MAX DM MAX) 5-100 MG/5ML Liquid     fluticasone (FLOVENT HFA) 110 MCG/ACT Aerosol Inhale 1 Puff 2 times a day.    lovastatin (MEVACOR) 40 MG tablet TAKE 1 TABLET BY MOUTH EVERY DAY IN THE EVENING    methocarbamol (ROBAXIN)  "750 MG Tab Take 750 mg by mouth at bedtime as needed (Pain).    gabapentin (NEURONTIN) 100 MG Cap Take 200 mg by mouth 2 times a day. 2 tabs in the morning and 3 tabs at night    Cholecalciferol (VITAMIN D3) 2000 UNIT Cap Take 2 Capsules by mouth every day.    chlorpheniramine (CHLOR-TRIMETRON) 4 MG Tab Take 4 mg by mouth every morning. Second dose in the evening PRN    omeprazole (PRILOSEC) 40 MG delayed-release capsule Take 1 Capsule by mouth 2 (two) times a day.         ALLERGIES:   Allergies   Allergen Reactions    Celecoxib Unspecified     GERD reaction    Lisinopril Cough    Nsaids Unspecified     GERD reaction    Other Drug Unspecified     bioxin    Benadryl Allergy Unspecified     \"tongue gets fuzzy and feel like I am drugged\"    Clarithromycin Vomiting       IMMUNIZATIONS:  Immunization History   Administered Date(s) Administered    Influenza Seasonal Injectable - Historical Data 10/15/2013    Influenza Vaccine Adult HD 2014, 2020, 10/05/2022, 2023    Influenza Vaccine Pediatric Split - Historical Data 2010    Influenza, Unspecified - HISTORICAL DATA 10/15/2019, 10/21/2021    MODERNA SARS-COV-2 VACCINE (12+) 2021, 2021, 2021    PFIZER BIVALENT SARS-COV-2 VACCINE (12+) 10/05/2022    PFIZER ASTUDILLO CAP SARS-COV-2 VACCINATION (12+) 2022    Pneumococcal Conjugate Vaccine (Prevnar/PCV-13) 2014    Pneumococcal polysaccharide vaccine (PPSV-23) 2018    RSV AREXVY VACCINE 2023    Tdap Vaccine 2013       SOCIAL HISTORY:   Social History     Socioeconomic History    Marital status:    Tobacco Use    Smoking status: Former     Current packs/day: 0.00     Average packs/day: 1 pack/day for 30.0 years (30.0 ttl pk-yrs)     Types: Cigarettes     Start date:      Quit date:      Years since quittin.8    Smokeless tobacco: Never   Vaping Use    Vaping Use: Never used   Substance and Sexual Activity    Alcohol use: Yes     Comment: rare "    Drug use: No     Social Determinants of Health     Financial Resource Strain: Low Risk  (10/18/2022)    Overall Financial Resource Strain (CARDIA)     Difficulty of Paying Living Expenses: Not hard at all   Food Insecurity: No Food Insecurity (10/18/2022)    Hunger Vital Sign     Worried About Running Out of Food in the Last Year: Never true     Ran Out of Food in the Last Year: Never true   Transportation Needs: No Transportation Needs (10/18/2022)    PRAPARE - Transportation     Lack of Transportation (Medical): No     Lack of Transportation (Non-Medical): No   Physical Activity: Inactive (10/18/2022)    Exercise Vital Sign     Days of Exercise per Week: 0 days     Minutes of Exercise per Session: 0 min   Stress: No Stress Concern Present (10/18/2022)    [place] [employer] - Occupational Stress Questionnaire     Feeling of Stress : Not at all   Social Connections: Moderately Integrated (10/18/2022)    Social Connection and Isolation Panel [NHANES]     Frequency of Communication with Friends and Family: More than three times a week     Frequency of Social Gatherings with Friends and Family: More than three times a week     Attends [employer] Services: More than 4 times per year     Active Member of Clubs or Organizations: Yes     Attends Club or Organization Meetings: More than 4 times per year     Marital Status:    Intimate Partner Violence: Not At Risk (10/18/2022)    Humiliation, Afraid, Rape, and Kick questionnaire     Fear of Current or Ex-Partner: No     Emotionally Abused: No     Physically Abused: No     Sexually Abused: No   Housing Stability: Low Risk  (10/18/2022)    Housing Stability Vital Sign     Unable to Pay for Housing in the Last Year: No     Number of Places Lived in the Last Year: 1     Unstable Housing in the Last Year: No       FAMILY HISTORY:  Family History   Problem Relation Age of Onset    Cancer Father     Heart Disease Father             Objective  "    Vital Signs: /64 (BP Location: Left arm, Patient Position: Sitting, BP Cuff Size: Adult)   Pulse 94   Temp 36.1 °C (96.9 °F) (Temporal)   Ht 1.613 m (5' 3.5\")   Wt 81.2 kg (179 lb)   SpO2 90% Body mass index is 31.21 kg/m².    General: Appears well and comfortable  Eyes: No scleral or conjunctival lesions  ENT: No apparent oral or nasal lesions  Head/Neck: No apparent scalp or neck lesions  Cardiovascular: Regular rate and rhythm  Respiratory: Breathing quiet and unlabored  Gastrointestinal: No apparent organomegaly or abdominal masses  Integumentary: No significant cutaneous lesions or discolorations  Musculoskeletal: Mild to moderate tenderness of shoulders (on range of motion) and hips (over greater trochanter); poorly localized minimal tenderness of hands; no periarticular soft tissue swelling, warmth, erythema, or overt synovitis  Neurologic: No focal sensory or motor deficits  Psychiatric: Mood and affect appropriate      LABORATORY RESULTS REVIEWED AND INTERPRETED BY ME:  Lab Results   Component Value Date/Time    CREACTPROT <0.30 07/18/2023 10:39 AM    SEDRATEWES 5 07/18/2023 10:39 AM     Lab Results   Component Value Date/Time    RHEUMFACTN 10 07/18/2023 10:39 AM    CCPANTIBODY 2 07/18/2023 10:39 AM    [id number] Negative 10/13/2022 01:10 PM     Lab Results   Component Value Date/Time    ANTINUCAB None Detected 07/18/2023 10:39 AM     Lab Results   Component Value Date/Time    TSHULTRASEN 2.070 09/22/2022 07:49 AM     Lab Results   Component Value Date/Time    WBC 19.6 (H) 06/06/2023 03:07 AM    RBC 4.66 06/06/2023 03:07 AM    HEMOGLOBIN 13.6 06/06/2023 03:07 AM    HEMATOCRIT 40.5 06/06/2023 03:07 AM    MCV 86.9 06/06/2023 03:07 AM    MCH 29.2 06/06/2023 03:07 AM    MCHC 33.6 06/06/2023 03:07 AM    RDW 45.7 06/06/2023 03:07 AM    PLATELETCT 345 06/06/2023 03:07 AM    MPV 10.6 06/06/2023 03:07 AM    NEUTS 11.88 (H) 06/03/2023 03:47 PM    LYMPHOCYTES 8.00 (L) 06/03/2023 03:47 PM    MONOCYTES " 7.40 06/03/2023 03:47 PM    EOSINOPHILS 0.10 06/03/2023 03:47 PM    BASOPHILS 0.20 06/03/2023 03:47 PM    ANISOCYTOSIS 1+ 12/24/2021 12:10 PM     Lab Results   Component Value Date/Time    ASTSGOT 31 06/03/2023 03:47 PM    ALTSGPT 15 06/03/2023 03:47 PM    ALKPHOSPHAT 108 (H) 06/03/2023 03:47 PM    TBILIRUBIN 1.0 06/03/2023 03:47 PM    TOTPROTEIN 6.5 06/03/2023 03:47 PM    ALBUMIN 4.2 06/03/2023 03:47 PM     Lab Results   Component Value Date/Time    SODIUM 132 (L) 06/06/2023 03:07 AM    POTASSIUM 3.4 (L) 06/06/2023 03:07 AM    CHLORIDE 95 (L) 06/06/2023 03:07 AM    CO2 25 06/06/2023 03:07 AM    GLUCOSE 175 (H) 06/06/2023 03:07 AM    BUN 25 (H) 06/06/2023 03:07 AM    CREATININE 0.89 06/06/2023 03:07 AM    CREATININE 0.9 12/12/2007 12:30 PM    BUNCREATRAT 20 10/01/2021 08:17 AM    CALCIUM 9.7 06/06/2023 03:07 AM     Lab Results   Component Value Date/Time    COLORURINE Yellow 06/04/2023 02:00 AM    SPECGRAVITY 1.020 06/04/2023 02:00 AM    PHURINE 5.5 06/04/2023 02:00 AM    GLUCOSEUR Negative 06/04/2023 02:00 AM    KETONES Negative 06/04/2023 02:00 AM    PROTEINURIN Negative 06/04/2023 02:00 AM     Lab Results   Component Value Date/Time    CHOLSTRLTOT 202 (H) 09/22/2022 07:49 AM     (H) 09/22/2022 07:49 AM    HDL 64 09/22/2022 07:49 AM    TRIGLYCERIDE 187 (H) 09/22/2022 07:49 AM    HBA1C 6.1 (A) 07/13/2023 10:31 AM       RADIOLOGY RESULTS REVIEWED AND INTERPRETED BY ME:  Results for orders placed in visit on 07/13/21    DX-HAND 3+ LEFT    Results for orders placed during the hospital encounter of 04/15/23    DX-SHOULDER 2+ RIGHT    Impression  Tiny glenohumeral joint spurs. No erosive changes.    Results for orders placed in visit on 10/06/22    DS-BONE DENSITY STUDY (DEXA)    Impression  According to the World Health Organization classification, bone mineral density of this patient is osteoporotic in the distal left forearm and normal in the proximal left femur.    10-year Probability of Fracture:  Major  Osteoporotic     16.8%  Hip     5.3%  Population      [place] ()    Based on left femur neck BMD    Results for orders placed during the hospital encounter of 03/12/13    CT-LSPINE W/O PLUS RECONS    Impression  1. No acute fracture or dislocation lumbar spine.  Multilevel degenerative changes.  2. Grade 1 L4 anterolisthesis.  No definite pars defect.  3. Densities within the lumbar spine and visualized pelvis nonspecific and could be multiple bone islands.  Osteopoikilosis a consideration.  Metastatic disease cannot be excluded.  4. Moderate bulging of the L3 and L4 disks.    Results for orders placed during the hospital encounter of 03/12/13    CT-TSPINE W/O PLUS RECONS    Impression  1. Mild anterior wedging of the sixth thoracic vertebral body that appears to be old.  Correlate clinically.  2. Heights and remainder of the vertebral bodies maintained.  Multilevel degenerative changes.  3. Posterior dome of the right lobe of the liver hypodensities are most likely hemangiomas and/or cysts.    Results for orders placed in visit on 01/08/13    MR-LUMBAR SPINE-W/O    Impression  1. Multilevel degenerative disk disease and facet degeneration which results in varying degrees of central canal and neural foraminal narrowing as specifically described above.    2. Grade 1 anterior subluxation at the L4-5 level.      All relevant laboratory and imaging results reported on this note were reviewed and interpreted by me.         Assessment & Plan     Iveth Martin is a 89 y.o. female with history as noted above whose presentation merits the following diagnostic and clinical status impressions and recommendations:    1. Polymyalgia rheumatica (HCC)  Clinically and serologically quiescent with no significant evidence of disease activity. However, given her residual bilateral shoulder and hip pain likely secondary to calcific tendinitis of both shoulders and trochanteric bursitis of both hips, respectively, reasonable  to keep on minimal dose prednisone as she has requested.  - predniSONE (DELTASONE) 1 MG Tab; Take 1-2 tablets daily as needed. Take with food.  Dispense: 180 Tablet; Refill: 3    2. Calcific tendinitis of both shoulders  Etiology of her bilateral shoulder pain on range of motion confirmed on physical exam.  - predniSONE (DELTASONE) 1 MG Tab; Take 1-2 tablets daily as needed. Take with food.  Dispense: 180 Tablet; Refill: 3  - Consider repeat steroid injection depending on her clinical trajectory    3. Trochanteric bursitis of both hips  Etiology of her bilateral outer hip pain confirmed on physical exam.  - predniSONE (DELTASONE) 1 MG Tab; Take 1-2 tablets daily as needed. Take with food.  Dispense: 180 Tablet; Refill: 3  - Consider intra-bursal steroid injection depending on her clinical trajectory    4. Primary osteoarthritis of both hands  Significant component of her overall joint pain burden which can be managed supportively.  - Voltaren 1% gel and Tylenol Arthritis PRN as oral NSAIDs should be avoided  - Consider intra-articular steroid injection if that becomes necessary      The above assessment and plan were discussed with the patient who acknowledged understanding of the plan.    FOLLOW-UP: Return in about 1 year (around 10/17/2024) for Short.         Thank you for the opportunity to participate in the care of Iveth Martin.    Don Ulrich MD, MS, FACR  Rheumatologist, [facility] ? [facility]   of Clinical Medicine, Department of Internal Medicine  [facility] ? [facility]

## 2023-10-19 ENCOUNTER — OFFICE VISIT (OUTPATIENT)
Dept: MEDICAL GROUP | Facility: MEDICAL CENTER | Age: 88
End: 2023-10-19
Payer: MEDICARE

## 2023-10-19 VITALS
TEMPERATURE: 97 F | HEART RATE: 80 BPM | OXYGEN SATURATION: 95 % | WEIGHT: 180.12 LBS | HEIGHT: 64 IN | RESPIRATION RATE: 16 BRPM | BODY MASS INDEX: 30.75 KG/M2 | SYSTOLIC BLOOD PRESSURE: 100 MMHG | DIASTOLIC BLOOD PRESSURE: 60 MMHG

## 2023-10-19 DIAGNOSIS — G47.00 INSOMNIA, UNSPECIFIED TYPE: ICD-10-CM

## 2023-10-19 PROCEDURE — 3074F SYST BP LT 130 MM HG: CPT | Performed by: STUDENT IN AN ORGANIZED HEALTH CARE EDUCATION/TRAINING PROGRAM

## 2023-10-19 PROCEDURE — 99213 OFFICE O/P EST LOW 20 MIN: CPT | Performed by: STUDENT IN AN ORGANIZED HEALTH CARE EDUCATION/TRAINING PROGRAM

## 2023-10-19 PROCEDURE — 3078F DIAST BP <80 MM HG: CPT | Performed by: STUDENT IN AN ORGANIZED HEALTH CARE EDUCATION/TRAINING PROGRAM

## 2023-10-19 RX ORDER — ZOLPIDEM TARTRATE 5 MG/1
5 TABLET ORAL
Qty: 90 TABLET | Refills: 0 | Status: SHIPPED | OUTPATIENT
Start: 2023-10-19 | End: 2024-01-10 | Stop reason: SDUPTHER

## 2023-10-19 ASSESSMENT — FIBROSIS 4 INDEX: FIB4 SCORE: 2.09

## 2023-10-19 NOTE — PROGRESS NOTES
"Subjective:     Chief Complaint   Patient presents with    Medication Refill     Ambien      HPI:   Iveth presents today with     Insomnia  Patient has been taking Ambien nightly since approximately 1990s.  Patient has been advised of the risk of this medication.  She continues to note insomnia despite taking this medication.  Patient has been advised that this medication is not preferred for insomnia in geriatric patients.  Patient advised of risk of this medication.  Patient notes that she is careful to avoid falls.  Patient will try and take a half dose of the medication when possible to reduce amount.     No aberrant or addictive use of medications has been observed. No adverse events have been reported.  Patient counseled to keep medications locked up or under personal control.  [facility] interface is reviewed.  No inconsistencies are found.     Patient continues to follow with Dr. Damon for pain management.    Rheumatology recently started patient on prednisone 1 mg daily.  Patient notes that she will follow-up with rheumatology for another year.    COVID  Patient notes that she recently had COVID.   patient feeling improved today.  No residual effects.        ROS:  Gen: no fevers/chills  Pulm: no sob, no cough  CV: no chest pain, no palpitations  GI: no nausea/vomiting, no diarrhea        Objective:     Exam:  /60   Pulse 80   Temp 36.1 °C (97 °F) (Temporal)   Resp 16   Ht 1.613 m (5' 3.5\")   Wt 81.7 kg (180 lb 1.9 oz)   SpO2 95%   BMI 31.41 kg/m²  Body mass index is 31.41 kg/m².    Gen: Alert and oriented, No apparent distress.  Neck: Neck is supple without lymphadenopathy.  Lungs: Normal effort, CTA bilaterally, no wheezes, rhonchi, or rales  CV: Regular rate and rhythm. No murmurs, rubs, or gallops.  Ext: No clubbing, cyanosis, edema.      Assessment & Plan:     89 y.o. female with the following -     1. Insomnia, unspecified type  - zolpidem (AMBIEN) 5 MG Tab; Take 1 Tablet by " mouth at bedtime for 90 days.  Dispense: 90 Tablet; Refill: 0     No follow-ups on file.    Please note that this dictation was created using voice recognition software. I have made every reasonable attempt to correct obvious errors, but I expect that there are errors of grammar and possibly content that I did not discover before finalizing the note.

## 2023-10-25 ENCOUNTER — TELEPHONE (OUTPATIENT)
Dept: HEALTH INFORMATION MANAGEMENT | Facility: OTHER | Age: 88
End: 2023-10-25
Payer: MEDICARE

## 2023-10-25 NOTE — TELEPHONE ENCOUNTER
Iveth call reporting an increased cough and nasal congestion. She is able to check her oxygen saturation at home. She reports it is currently at 94%. She has not used her nebulizer. Discussed the use of her home nebulizer vs home albuterol inhaler. Iveth asked to schedule an appointment at the clinic. First available with her PCP was 10/30. She agreed, scheduled for 9:00 with an 8:45 check in. Iveth states she would report to urgent care if her symptoms worsened before her appointment.

## 2023-10-26 ENCOUNTER — TELEPHONE (OUTPATIENT)
Dept: HEALTH INFORMATION MANAGEMENT | Facility: OTHER | Age: 88
End: 2023-10-26
Payer: MEDICARE

## 2023-10-26 NOTE — TELEPHONE ENCOUNTER
Iveth left a message stating she was feeling worse and had medication questions for her PCP. Blekis recommended Iveth be evaluated at urgent care.   Returned Iveth's call and recommended she get evaluated at Urgent Care. Iveth states she will continue to monitor her oxygen levels and if she feels worse she will go to urgent care. Otherwise she will try to wait for her appointment with Belkis on 10/30. Encouraged Iveth to report to urgent care.

## 2023-10-30 ENCOUNTER — OFFICE VISIT (OUTPATIENT)
Dept: MEDICAL GROUP | Facility: MEDICAL CENTER | Age: 88
End: 2023-10-30
Payer: MEDICARE

## 2023-10-30 VITALS
WEIGHT: 177.69 LBS | HEART RATE: 86 BPM | BODY MASS INDEX: 30.34 KG/M2 | DIASTOLIC BLOOD PRESSURE: 60 MMHG | RESPIRATION RATE: 16 BRPM | HEIGHT: 64 IN | OXYGEN SATURATION: 94 % | TEMPERATURE: 96.7 F | SYSTOLIC BLOOD PRESSURE: 116 MMHG

## 2023-10-30 DIAGNOSIS — R05.1 ACUTE COUGH: ICD-10-CM

## 2023-10-30 PROCEDURE — 99213 OFFICE O/P EST LOW 20 MIN: CPT | Performed by: STUDENT IN AN ORGANIZED HEALTH CARE EDUCATION/TRAINING PROGRAM

## 2023-10-30 PROCEDURE — 3078F DIAST BP <80 MM HG: CPT | Performed by: STUDENT IN AN ORGANIZED HEALTH CARE EDUCATION/TRAINING PROGRAM

## 2023-10-30 PROCEDURE — 3074F SYST BP LT 130 MM HG: CPT | Performed by: STUDENT IN AN ORGANIZED HEALTH CARE EDUCATION/TRAINING PROGRAM

## 2023-10-30 RX ORDER — METHYLPREDNISOLONE 4 MG/1
TABLET ORAL
Qty: 21 TABLET | Refills: 0 | Status: SHIPPED | OUTPATIENT
Start: 2023-10-30 | End: 2024-01-10

## 2023-10-30 ASSESSMENT — FIBROSIS 4 INDEX: FIB4 SCORE: 2.09

## 2023-10-30 NOTE — PROGRESS NOTES
"Subjective:     Chief Complaint   Patient presents with    Nasal Congestion     Cough, drainage, chest congestion, phlegm,          HPI:   Iveth presents today with     Patient tested positive for COVID 9/25/2023 and followed up in this office approximately 2 weeks later.  Patient was not treated with Paxlovid.  At follow-up, 2 weeks after COVID patient was treated with Augmentin and prednisone.  X-ray of chest at that time showed no airspace consolidation.    Patient was seen in this office approximately 2 weeks later feeling better but still having a cough.  Patient notes that cough got significantly worse last week with chest congestion, phlegm and nasal congestion.  Patient talked to chronic care management and was provided assistance by Marielena who recommended she start using her nebulizer.  Patient has been using nebulizer 3 times per day and albuterol as needed.  Patient takes Flovent twice daily.  Patient notes that she is feeling improved today and breathing better.  Lungs continue to be wheezy on auscultation.    ROS:  Gen: no fevers/chills  Pulm: no sob, no cough  CV: no chest pain, no palpitations  GI: no nausea/vomiting, no diarrhea        Objective:     Exam:  Temp (!) 35.6 °C (96 °F) (Temporal)   Ht 1.613 m (5' 3.5\")   Wt 80.6 kg (177 lb 11.1 oz)   BMI 30.98 kg/m²  Body mass index is 30.98 kg/m².    Gen: Alert and oriented, No apparent distress.  Neck: Neck is supple without lymphadenopathy.  Lungs: Normal effort, CTA bilaterally, no wheezes, rhonchi, or rales  CV: Regular rate and rhythm. No murmurs, rubs, or gallops.  Ext: No clubbing, cyanosis, edema.      Assessment & Plan:     89 y.o. female with the following -     1. Acute cough  Acute, stable.  Lungs wheezy bilaterally, no crackles.  Patient feeling improved from previous, notes no shortness of breath today.  Patient did have recent x-ray, discussed repeating x-ray if continuing to have symptoms.  Reviewed medication regiment including " inhalers, nebulizer, antihistamine and will do another Medrol Dosepak to reduce inflammation.  Patient encouraged to use spirometer and get plenty of rest.  Discussed things and symptoms that warrant emergent evaluation.  Oxygen saturations and vital signs within normal limits.  - Referral to Pulmonary and Sleep Medicine  - methylPREDNISolone (MEDROL DOSEPAK) 4 MG Tablet Therapy Pack; As directed on the packaging label.  Dispense: 21 Tablet; Refill: 0       No follow-ups on file.    Please note that this dictation was created using voice recognition software. I have made every reasonable attempt to correct obvious errors, but I expect that there are errors of grammar and possibly content that I did not discover before finalizing the note.

## 2023-11-02 ENCOUNTER — PATIENT OUTREACH (OUTPATIENT)
Dept: HEALTH INFORMATION MANAGEMENT | Facility: OTHER | Age: 88
End: 2023-11-02
Payer: MEDICARE

## 2023-11-02 DIAGNOSIS — M79.605 BILATERAL LEG PAIN: ICD-10-CM

## 2023-11-02 DIAGNOSIS — I70.0 ATHEROSCLEROSIS OF AORTA (HCC): ICD-10-CM

## 2023-11-02 DIAGNOSIS — N18.31 CHRONIC KIDNEY DISEASE, STAGE 3A: ICD-10-CM

## 2023-11-02 DIAGNOSIS — M79.604 BILATERAL LEG PAIN: ICD-10-CM

## 2023-11-02 DIAGNOSIS — I10 HYPERTENSION, UNSPECIFIED TYPE: ICD-10-CM

## 2023-11-02 PROCEDURE — 99490 CHRNC CARE MGMT STAFF 1ST 20: CPT | Performed by: STUDENT IN AN ORGANIZED HEALTH CARE EDUCATION/TRAINING PROGRAM

## 2023-11-02 NOTE — PROGRESS NOTES
Assessment  Spoke with Iveth for monthly outreach follow up. Iveth states she is finally starting to feel better today. Her cough has improved today. She has a few day left of her medrol dose pack. She went out grocery shopping today. Discussed her cough symptoms. Discussed inhaler. Discussed COVID/Flu precautions of wearing a mask and hand washing. Discussed the need for a new PCP. [facility] is leaving in January. Iveth denied any needs at this time. Encouraged to call with any questions or concerns.     Education  Discussed COVID/Flu precaution-wearing a mask, hand washing     Plan of Care and Goals  Continue to increase activity     Barriers:  Age  Progression of disease process-chronic cough  Knowledge of healthcare  Habits      Progress:  Progressing     Next outreach:  1 month

## 2023-11-10 ENCOUNTER — PATIENT OUTREACH (OUTPATIENT)
Dept: HEALTH INFORMATION MANAGEMENT | Facility: OTHER | Age: 88
End: 2023-11-10
Payer: MEDICARE

## 2023-11-10 ENCOUNTER — TELEPHONE (OUTPATIENT)
Dept: HEALTH INFORMATION MANAGEMENT | Facility: OTHER | Age: 88
End: 2023-11-10
Payer: MEDICARE

## 2023-11-10 DIAGNOSIS — I10 HYPERTENSION, UNSPECIFIED TYPE: ICD-10-CM

## 2023-11-10 NOTE — TELEPHONE ENCOUNTER
Iveth left a message that her cough has improved. Returned Iveth's call. Discussed her overall health and healthy habits. Dicussed increasing regular exercise. Discussed difficulty the some housekeeping chores. Iveth asked for housekeeping resources.

## 2023-11-10 NOTE — PROGRESS NOTES
Community Health Worker Follow-Up    Reason for outreach: CHW called to pt to follow up on resources as requested by the CCM nurse.    CHW Interventions: CHW and the pt discussed the resources needed.     Specific Resources Provided:  Housing/Shelter: n/a  Transportation: n/a  Food: n/a  Financial: n/a  Social Supports: n/a  Other: Assistance with ADLs, Senior Helpers, Lend a hand, Comfort keepers and others, Fall prevention and Strength and balance exercises.2    Plan: CHW will follow up with the pt in a couple weeks.

## 2023-11-16 ENCOUNTER — PATIENT OUTREACH (OUTPATIENT)
Dept: HEALTH INFORMATION MANAGEMENT | Facility: OTHER | Age: 88
End: 2023-11-16
Payer: MEDICARE

## 2023-11-16 DIAGNOSIS — I10 PRIMARY HYPERTENSION: ICD-10-CM

## 2023-11-16 NOTE — PROGRESS NOTES
Community Health Worker Follow-Up    Reason for outreach: CHW received a call from the pt.      CHW Interventions: Pt was concerned that she had not gotten the resources sent to her yet.    Specific Resources Provided:  Housing/Shelter: n/a  Transportation: n/a  Food: n/a  Financial: n/a  Social Supports: n/a  Other: n/a    Plan: CHW will follow up with the pt on monday.

## 2023-11-20 ENCOUNTER — PATIENT OUTREACH (OUTPATIENT)
Dept: HEALTH INFORMATION MANAGEMENT | Facility: OTHER | Age: 88
End: 2023-11-20
Payer: MEDICARE

## 2023-11-20 DIAGNOSIS — I10 PRIMARY HYPERTENSION: ICD-10-CM

## 2023-11-20 NOTE — PROGRESS NOTES
Community Health Worker Follow-Up    Reason for outreach: CHW called the pt to follow up on resources sent.     CHW Interventions: CHW and the pt discussed the resources. Pt stated she was going to look through them this morning while she is having coffee. CHW will call the pt next week to check in.    Specific Resources Provided:  Housing/Shelter: n/a  Transportation: n/a  Food: n/a  Financial: n/a  Social Supports: n/a  Other: n/a    Plan: CHW will call the pt next next week to go over resources.

## 2023-11-29 DIAGNOSIS — R05.2 SUBACUTE COUGH: ICD-10-CM

## 2023-11-29 RX ORDER — DEXAMETHASONE 4 MG/1
1 TABLET ORAL 2 TIMES DAILY
Qty: 12 EACH | Refills: 3 | Status: SHIPPED | OUTPATIENT
Start: 2023-11-29 | End: 2024-02-20

## 2023-12-05 ENCOUNTER — PATIENT OUTREACH (OUTPATIENT)
Dept: HEALTH INFORMATION MANAGEMENT | Facility: OTHER | Age: 88
End: 2023-12-05
Payer: MEDICARE

## 2023-12-05 DIAGNOSIS — M79.605 BILATERAL LEG PAIN: ICD-10-CM

## 2023-12-05 DIAGNOSIS — I70.0 ATHEROSCLEROSIS OF AORTA (HCC): ICD-10-CM

## 2023-12-05 DIAGNOSIS — I10 PRIMARY HYPERTENSION: ICD-10-CM

## 2023-12-05 DIAGNOSIS — M79.604 BILATERAL LEG PAIN: ICD-10-CM

## 2023-12-05 DIAGNOSIS — N18.31 CHRONIC KIDNEY DISEASE, STAGE 3A: ICD-10-CM

## 2023-12-05 DIAGNOSIS — I10 HYPERTENSION, UNSPECIFIED TYPE: ICD-10-CM

## 2023-12-05 PROCEDURE — 99439 CHRNC CARE MGMT STAF EA ADDL: CPT | Performed by: STUDENT IN AN ORGANIZED HEALTH CARE EDUCATION/TRAINING PROGRAM

## 2023-12-05 PROCEDURE — 99490 CHRNC CARE MGMT STAFF 1ST 20: CPT | Performed by: STUDENT IN AN ORGANIZED HEALTH CARE EDUCATION/TRAINING PROGRAM

## 2023-12-05 NOTE — PROGRESS NOTES
"Assessment  Spoke with Iveth for monthly outreach follow up.Iveth state is doing \"quite well.\" Her cough has improved. She is back to her regular activities. Discussed chair exercised sent out by CHW Clay. Iveth feels that she will be ready to start some of them after the holidays are over. She discussed social opportunities and plans for the holiday season.      Education  Discussed next PCP appointment and need for new PCP     Plan of Care and Goals  Continue to remain active and increase exercise     Barriers:  Age  Progression of disease process-chronic cough  Knowledge of healthcare  Habits         Progress:  Progressing     Next outreach:  1 month                        "

## 2023-12-06 RX ORDER — ALBUTEROL SULFATE 90 UG/1
2 AEROSOL, METERED RESPIRATORY (INHALATION) EVERY 6 HOURS PRN
Qty: 8.5 EACH | Refills: 1 | Status: SHIPPED | OUTPATIENT
Start: 2023-12-06

## 2023-12-27 ENCOUNTER — TELEPHONE (OUTPATIENT)
Dept: HEALTH INFORMATION MANAGEMENT | Facility: OTHER | Age: 88
End: 2023-12-27
Payer: MEDICARE

## 2023-12-28 NOTE — TELEPHONE ENCOUNTER
Iveth called and left a message with questions regarding medication and pharmacy coverage for the new year. Returned Iveth's call and answered her questions. Reviewed upcoming follow up appointments. Iveth denied any other needs. Encouraged to call with any questions or concerns.

## 2024-01-04 ENCOUNTER — DOCUMENTATION (OUTPATIENT)
Dept: HEALTH INFORMATION MANAGEMENT | Facility: OTHER | Age: 89
End: 2024-01-04
Payer: MEDICARE

## 2024-01-04 ENCOUNTER — TELEPHONE (OUTPATIENT)
Dept: HEALTH INFORMATION MANAGEMENT | Facility: OTHER | Age: 89
End: 2024-01-04
Payer: MEDICARE

## 2024-01-04 NOTE — DISCHARGE PLANNING
Case Management Discharge Planning    Admission Date: 6/3/2023  GMLOS: 3.5  ALOS: 3    6-Clicks ADL Score: 24  6-Clicks Mobility Score: 21      Anticipated Discharge Dispo: Discharge Disposition: D/T to home under HHA care in anticipation of covered skilled care (06)    DME Needed: Yes    DME Ordered: Yes    Action(s) Taken:    RNCM attended IDT rounds and reviewed chart. Per chart, [facility]  has accepted.    Home O2 order received. RNCM sent O2 referral to [facility] at [facility]5 per choice form.     Escalations Completed: None    Medically Clear: Yes    Next Steps: f/u with DPA regarding O2 delivery ETA    Barriers to Discharge: DME         Detail Level: Detailed

## 2024-01-10 ENCOUNTER — OFFICE VISIT (OUTPATIENT)
Dept: MEDICAL GROUP | Facility: MEDICAL CENTER | Age: 89
End: 2024-01-10
Payer: MEDICARE

## 2024-01-10 VITALS
DIASTOLIC BLOOD PRESSURE: 60 MMHG | HEIGHT: 64 IN | BODY MASS INDEX: 31.31 KG/M2 | SYSTOLIC BLOOD PRESSURE: 114 MMHG | TEMPERATURE: 97.9 F | HEART RATE: 83 BPM | OXYGEN SATURATION: 96 % | RESPIRATION RATE: 16 BRPM | WEIGHT: 183.42 LBS

## 2024-01-10 DIAGNOSIS — E66.9 OBESITY (BMI 30.0-34.9): ICD-10-CM

## 2024-01-10 DIAGNOSIS — G47.00 INSOMNIA, UNSPECIFIED TYPE: ICD-10-CM

## 2024-01-10 DIAGNOSIS — K21.9 GASTROESOPHAGEAL REFLUX DISEASE, UNSPECIFIED WHETHER ESOPHAGITIS PRESENT: ICD-10-CM

## 2024-01-10 DIAGNOSIS — E78.00 HYPERCHOLESTEROLEMIA: ICD-10-CM

## 2024-01-10 DIAGNOSIS — N18.2 STAGE 2 CHRONIC KIDNEY DISEASE: ICD-10-CM

## 2024-01-10 DIAGNOSIS — R73.03 PREDIABETES: ICD-10-CM

## 2024-01-10 DIAGNOSIS — F13.20 SEDATIVE HYPNOTIC OR ANXIOLYTIC DEPENDENCE (HCC): ICD-10-CM

## 2024-01-10 DIAGNOSIS — I10 PRIMARY HYPERTENSION: ICD-10-CM

## 2024-01-10 DIAGNOSIS — I70.0 ATHEROSCLEROSIS OF AORTA (HCC): ICD-10-CM

## 2024-01-10 LAB
HBA1C MFR BLD: 6.3 % (ref ?–5.8)
POCT INT CON NEG: NEGATIVE
POCT INT CON POS: POSITIVE

## 2024-01-10 PROCEDURE — 3078F DIAST BP <80 MM HG: CPT | Performed by: STUDENT IN AN ORGANIZED HEALTH CARE EDUCATION/TRAINING PROGRAM

## 2024-01-10 PROCEDURE — 3074F SYST BP LT 130 MM HG: CPT | Performed by: STUDENT IN AN ORGANIZED HEALTH CARE EDUCATION/TRAINING PROGRAM

## 2024-01-10 PROCEDURE — 83036 HEMOGLOBIN GLYCOSYLATED A1C: CPT | Performed by: STUDENT IN AN ORGANIZED HEALTH CARE EDUCATION/TRAINING PROGRAM

## 2024-01-10 PROCEDURE — 99214 OFFICE O/P EST MOD 30 MIN: CPT | Performed by: STUDENT IN AN ORGANIZED HEALTH CARE EDUCATION/TRAINING PROGRAM

## 2024-01-10 RX ORDER — ZOLPIDEM TARTRATE 5 MG/1
5 TABLET ORAL
Qty: 90 TABLET | Refills: 0 | Status: SHIPPED | OUTPATIENT
Start: 2024-01-10 | End: 2024-04-09

## 2024-01-10 ASSESSMENT — PATIENT HEALTH QUESTIONNAIRE - PHQ9: CLINICAL INTERPRETATION OF PHQ2 SCORE: 0

## 2024-01-10 ASSESSMENT — FIBROSIS 4 INDEX: FIB4 SCORE: 2.09

## 2024-01-10 NOTE — PROGRESS NOTES
"Subjective:     Chief Complaint   Patient presents with    Follow-Up     3 months     Medication Refill     Ambien          HPI:   Iveth presents today with     Insomnia  Patient has been taking Ambien nightly since approximately 1990s.  Patient has been advised of the risk of this medication. She continues to note insomnia despite taking this medication.  Patient has been advised that this medication is not preferred for insomnia in geriatric patients.  Patient advised of risk of this medication.  Patient notes that she is careful to avoid falls.  Patient will try and take a half dose of the medication when possible to reduce amount.     No aberrant or addictive use of medications has been observed. No adverse events have been reported.  Patient counseled to keep medications locked up or under personal control.  [facility] of pharmacy interface is reviewed.  No inconsistencies are found.     Hypertension  Patient continues on losartan hydrochlorothiazide.  Blood pressure controlled.     GERD  Patient continues on omeprazole 40 mg twice daily.     Polymyalgia rheumatica  Patient continues to follow with rheumatology yearly.  Patient continues on prednisone 1 mg daily.     Prediabetes  Patient's last A1c 6.1%.  At last visit discussed with patient the importance that she reduce carbohydrates and sugars in diet are also on the medication to treat diabetes. A1c 6.3%    CKD  Most recent GFR at 61.  Patient advised to avoid nephrotoxins and continue to maintain hydration.    Atherosclerosis of aorta  Hyperlipidemia   Patient continues on lovastatin 40 mg.      ROS:  Gen: no fevers/chills  Pulm: no sob, no cough  CV: no chest pain, no palpitations  GI: no nausea/vomiting, no diarrhea        Objective:     Exam:  /60   Pulse 83   Temp 36.6 °C (97.9 °F) (Temporal)   Resp 16   Ht 1.613 m (5' 3.5\")   Wt 83.2 kg (183 lb 6.8 oz)   SpO2 96%   BMI 31.98 kg/m²  Body mass index is 31.98 kg/m².    Gen: Alert and " oriented, No apparent distress.  Neck: Neck is supple without lymphadenopathy.  Lungs: Normal effort, CTA bilaterally, no wheezes, rhonchi, or rales  CV: Regular rate and rhythm. No murmurs, rubs, or gallops.  Ext: No clubbing, cyanosis, edema.      Assessment & Plan:     89 y.o. female with the following -       1. Prediabetes  Chronic, stable.  A1c stable at 6.3%.  Patient continues to work on diet and exercise.  - POCT A1C    2. Atherosclerosis of aorta (HCC)  Chronic, stable.  Patient continues on lovastatin.    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  Continues on Ambien 5 mg nightly.  No aberrant or addictive use has been observed.  Patient understands proper use and storage of medication.    4. Stage 2 chronic kidney disease  Chronic, stable.  GFR stable at 60.    5. Gastroesophageal reflux disease, unspecified whether esophagitis present  Chronic, stable.  Continues on omeprazole 40 mg twice daily.    6. Hypercholesterolemia  Chronic, stable.  Continues on lovastatin.    7. Primary hypertension  Chronic, stable.  Patient's blood pressure controlled.    8. Insomnia, unspecified type   reviewed.  No aberrant or addictive use has been observed.  Patient stable on Ambien 5 mg nightly.  Continue to recommend reducing use.  Patient understands risk for falls associated with this medication.  - zolpidem (AMBIEN) 5 MG Tab; Take 1 Tablet by mouth at bedtime for 90 days.  Dispense: 90 Tablet; Refill: 0    9. Obesity (BMI 30.0-34.9)  Chronic, stable.  Encouraged to continue working on exercise.    No follow-ups on file.    Please note that this dictation was created using voice recognition software. I have made every reasonable attempt to correct obvious errors, but I expect that there are errors of grammar and possibly content that I did not discover before finalizing the note.

## 2024-01-11 ENCOUNTER — PATIENT OUTREACH (OUTPATIENT)
Dept: HEALTH INFORMATION MANAGEMENT | Facility: OTHER | Age: 89
End: 2024-01-11
Payer: MEDICARE

## 2024-01-11 DIAGNOSIS — N18.31 CHRONIC KIDNEY DISEASE, STAGE 3A: ICD-10-CM

## 2024-01-11 DIAGNOSIS — M79.604 BILATERAL LEG PAIN: ICD-10-CM

## 2024-01-11 DIAGNOSIS — M79.605 BILATERAL LEG PAIN: ICD-10-CM

## 2024-01-11 DIAGNOSIS — I10 PRIMARY HYPERTENSION: ICD-10-CM

## 2024-01-11 DIAGNOSIS — I10 HYPERTENSION, UNSPECIFIED TYPE: ICD-10-CM

## 2024-01-11 DIAGNOSIS — I70.0 ATHEROSCLEROSIS OF AORTA (HCC): ICD-10-CM

## 2024-01-11 PROCEDURE — 99999 PR NO CHARGE: CPT | Performed by: STUDENT IN AN ORGANIZED HEALTH CARE EDUCATION/TRAINING PROGRAM

## 2024-01-11 NOTE — PROGRESS NOTES
"Assessment  Spoke to Iveth for monthly outreach follow-up.  Iveth reports she is doing \"very well.\"  She reports she had an excellent checkup with her PCP yesterday.  Her blood pressure was 114/60.  She feels her medications are working very well and she never misses a dose.  Discussed refills available at [facility].  She reports she has an appointment for her COVID-vaccine tomorrow.  Iveth denies any needs today.  Encouraged to call with any questions or concerns.     Education  Discussed healthy diet    Plan of Care and Goals  Continue to increase activity    Barriers:  Age  Progression of disease process-chronic cough  Knowledge of healthcare  Habits     Progress:  Progressing     Next outreach:  1 month     Chart reviewed for Quarterly Assessment, patient continues to qualify and benefit from PCM program.   "

## 2024-02-01 ENCOUNTER — TELEPHONE (OUTPATIENT)
Dept: HEALTH INFORMATION MANAGEMENT | Facility: OTHER | Age: 89
End: 2024-02-01
Payer: MEDICARE

## 2024-02-01 NOTE — TELEPHONE ENCOUNTER
Iveth called to discussed her Flovent prescription. It is no longer covered by [employer]. Discussed follow with Dr. Lacy on 2/20. She reports she has enough of her current inhaler to wait and she Dr. Sosa. Continue to follow and assist Iveth if need for a new inhaler prescription.

## 2024-02-12 ENCOUNTER — PATIENT OUTREACH (OUTPATIENT)
Dept: HEALTH INFORMATION MANAGEMENT | Facility: OTHER | Age: 89
End: 2024-02-12
Payer: MEDICARE

## 2024-02-12 DIAGNOSIS — I10 PRIMARY HYPERTENSION: ICD-10-CM

## 2024-02-12 DIAGNOSIS — I70.0 ATHEROSCLEROSIS OF AORTA (HCC): ICD-10-CM

## 2024-02-12 DIAGNOSIS — N18.31 CHRONIC KIDNEY DISEASE, STAGE 3A: ICD-10-CM

## 2024-02-19 SDOH — ECONOMIC STABILITY: HOUSING INSECURITY: IN THE LAST 12 MONTHS, HOW MANY PLACES HAVE YOU LIVED?: 1

## 2024-02-19 SDOH — HEALTH STABILITY: MENTAL HEALTH
STRESS IS WHEN SOMEONE FEELS TENSE, NERVOUS, ANXIOUS, OR CAN'T SLEEP AT NIGHT BECAUSE THEIR MIND IS TROUBLED. HOW STRESSED ARE YOU?: NOT AT ALL

## 2024-02-19 SDOH — ECONOMIC STABILITY: FOOD INSECURITY: WITHIN THE PAST 12 MONTHS, YOU WORRIED THAT YOUR FOOD WOULD RUN OUT BEFORE YOU GOT MONEY TO BUY MORE.: NEVER TRUE

## 2024-02-19 SDOH — HEALTH STABILITY: PHYSICAL HEALTH: ON AVERAGE, HOW MANY MINUTES DO YOU ENGAGE IN EXERCISE AT THIS LEVEL?: PATIENT DECLINED

## 2024-02-19 SDOH — ECONOMIC STABILITY: INCOME INSECURITY: HOW HARD IS IT FOR YOU TO PAY FOR THE VERY BASICS LIKE FOOD, HOUSING, MEDICAL CARE, AND HEATING?: NOT VERY HARD

## 2024-02-19 SDOH — ECONOMIC STABILITY: FOOD INSECURITY: WITHIN THE PAST 12 MONTHS, THE FOOD YOU BOUGHT JUST DIDN'T LAST AND YOU DIDN'T HAVE MONEY TO GET MORE.: NEVER TRUE

## 2024-02-19 SDOH — ECONOMIC STABILITY: TRANSPORTATION INSECURITY
IN THE PAST 12 MONTHS, HAS LACK OF RELIABLE TRANSPORTATION KEPT YOU FROM MEDICAL APPOINTMENTS, MEETINGS, WORK OR FROM GETTING THINGS NEEDED FOR DAILY LIVING?: NO

## 2024-02-19 SDOH — HEALTH STABILITY: PHYSICAL HEALTH
ON AVERAGE, HOW MANY DAYS PER WEEK DO YOU ENGAGE IN MODERATE TO STRENUOUS EXERCISE (LIKE A BRISK WALK)?: PATIENT DECLINED

## 2024-02-19 SDOH — ECONOMIC STABILITY: INCOME INSECURITY: IN THE LAST 12 MONTHS, WAS THERE A TIME WHEN YOU WERE NOT ABLE TO PAY THE MORTGAGE OR RENT ON TIME?: NO

## 2024-02-19 ASSESSMENT — SOCIAL DETERMINANTS OF HEALTH (SDOH)
HOW OFTEN DO YOU ATTEND CHURCH OR RELIGIOUS SERVICES?: MORE THAN 4 TIMES PER YEAR
HOW OFTEN DO YOU HAVE SIX OR MORE DRINKS ON ONE OCCASION: NEVER
IN A TYPICAL WEEK, HOW MANY TIMES DO YOU TALK ON THE PHONE WITH FAMILY, FRIENDS, OR NEIGHBORS?: MORE THAN THREE TIMES A WEEK
IN A TYPICAL WEEK, HOW MANY TIMES DO YOU TALK ON THE PHONE WITH FAMILY, FRIENDS, OR NEIGHBORS?: MORE THAN THREE TIMES A WEEK
HOW OFTEN DO YOU ATTENT MEETINGS OF THE CLUB OR ORGANIZATION YOU BELONG TO?: MORE THAN 4 TIMES PER YEAR
HOW MANY DRINKS CONTAINING ALCOHOL DO YOU HAVE ON A TYPICAL DAY WHEN YOU ARE DRINKING: PATIENT DOES NOT DRINK
WITHIN THE PAST 12 MONTHS, YOU WORRIED THAT YOUR FOOD WOULD RUN OUT BEFORE YOU GOT THE MONEY TO BUY MORE: NEVER TRUE
DO YOU BELONG TO ANY CLUBS OR ORGANIZATIONS SUCH AS CHURCH GROUPS UNIONS, FRATERNAL OR ATHLETIC GROUPS, OR SCHOOL GROUPS?: YES
HOW HARD IS IT FOR YOU TO PAY FOR THE VERY BASICS LIKE FOOD, HOUSING, MEDICAL CARE, AND HEATING?: NOT VERY HARD
HOW OFTEN DO YOU ATTEND CHURCH OR RELIGIOUS SERVICES?: MORE THAN 4 TIMES PER YEAR
HOW OFTEN DO YOU GET TOGETHER WITH FRIENDS OR RELATIVES?: TWICE A WEEK
DO YOU BELONG TO ANY CLUBS OR ORGANIZATIONS SUCH AS CHURCH GROUPS UNIONS, FRATERNAL OR ATHLETIC GROUPS, OR SCHOOL GROUPS?: YES
HOW OFTEN DO YOU ATTENT MEETINGS OF THE CLUB OR ORGANIZATION YOU BELONG TO?: MORE THAN 4 TIMES PER YEAR
HOW OFTEN DO YOU HAVE A DRINK CONTAINING ALCOHOL: NEVER
HOW OFTEN DO YOU GET TOGETHER WITH FRIENDS OR RELATIVES?: TWICE A WEEK

## 2024-02-19 ASSESSMENT — LIFESTYLE VARIABLES
HOW OFTEN DO YOU HAVE A DRINK CONTAINING ALCOHOL: NEVER
SKIP TO QUESTIONS 9-10: 1
AUDIT-C TOTAL SCORE: 0
HOW OFTEN DO YOU HAVE SIX OR MORE DRINKS ON ONE OCCASION: NEVER
HOW MANY STANDARD DRINKS CONTAINING ALCOHOL DO YOU HAVE ON A TYPICAL DAY: PATIENT DOES NOT DRINK

## 2024-02-20 ENCOUNTER — APPOINTMENT (OUTPATIENT)
Dept: MEDICAL GROUP | Facility: MEDICAL CENTER | Age: 89
End: 2024-02-20
Payer: MEDICARE

## 2024-02-20 ENCOUNTER — PATIENT OUTREACH (OUTPATIENT)
Dept: HEALTH INFORMATION MANAGEMENT | Facility: OTHER | Age: 89
End: 2024-02-20

## 2024-02-20 DIAGNOSIS — M79.605 BILATERAL LEG PAIN: ICD-10-CM

## 2024-02-20 DIAGNOSIS — N18.2 STAGE 2 CHRONIC KIDNEY DISEASE: ICD-10-CM

## 2024-02-20 DIAGNOSIS — I10 HYPERTENSION, UNSPECIFIED TYPE: ICD-10-CM

## 2024-02-20 DIAGNOSIS — M79.604 BILATERAL LEG PAIN: ICD-10-CM

## 2024-02-20 DIAGNOSIS — I70.0 ATHEROSCLEROSIS OF AORTA (HCC): ICD-10-CM

## 2024-02-20 DIAGNOSIS — N18.31 CHRONIC KIDNEY DISEASE, STAGE 3A: ICD-10-CM

## 2024-02-20 DIAGNOSIS — J44.9 CHRONIC OBSTRUCTIVE PULMONARY DISEASE, UNSPECIFIED COPD TYPE (HCC): ICD-10-CM

## 2024-02-20 DIAGNOSIS — I10 PRIMARY HYPERTENSION: ICD-10-CM

## 2024-02-20 RX ORDER — FLUTICASONE FUROATE 100 UG/1
1 POWDER RESPIRATORY (INHALATION) DAILY
Qty: 30 EACH | Refills: 6 | Status: SHIPPED | OUTPATIENT
Start: 2024-02-20 | End: 2025-02-19

## 2024-02-20 NOTE — PROGRESS NOTES
Assessment  Iveth called and left a message with questions regarding the cancellation of her appointment today.  Returned Iveth's call and conducted our monthly outreach follow-up as well.  Iveth's new PCP appointment was canceled due to the provider being out of the office.  Iveth reports that she is out of her Flovent prescription which she was going to discuss with the provider at her appointment.  She also reports that she has a head cold.  States she has a runny nose denies fever or any wheezing.  But at her age she worries her symptoms will take long time to recover she would like to be seen.  Assisted Iveth to make an appointment for Friday the 23rd in the clinic at 11:00, check in at 10:45. Iveth has Flovent prescription formulary has been changed to [employer] this has been sent to her pharmacy.  Discussed the new prescription.  Iveth verbalized understanding and hopes to  the prescription today.  Discussed with Iveth rescheduling a new PCP appointment.  Assisted to reschedule this appointment with Dr. Waterman on March 13 at 1 PM, check in at 12:45.  Iveth denies any other needs at this time.  Encouraged to call with any questions or concerns    Education  Discussed currently cold symptoms  Assisted to schedule an appointment  Assisted to reschedule new PCP appointment  Discussed Flovent prescription formulary changed to Arnuity     Plan of Care and Goals  Continue to remain active     Barriers:  Age  Progression of disease process  Knowledge of healthcare  Habits     Progress:  Progressing     Next outreach:  1 month

## 2024-02-23 ENCOUNTER — OFFICE VISIT (OUTPATIENT)
Dept: MEDICAL GROUP | Facility: MEDICAL CENTER | Age: 89
End: 2024-02-23
Payer: MEDICARE

## 2024-02-23 VITALS
HEIGHT: 64 IN | DIASTOLIC BLOOD PRESSURE: 76 MMHG | BODY MASS INDEX: 30.73 KG/M2 | TEMPERATURE: 97.5 F | HEART RATE: 89 BPM | SYSTOLIC BLOOD PRESSURE: 108 MMHG | OXYGEN SATURATION: 93 % | WEIGHT: 180 LBS

## 2024-02-23 DIAGNOSIS — R05.1 ACUTE COUGH: ICD-10-CM

## 2024-02-23 DIAGNOSIS — J06.9 UPPER RESPIRATORY TRACT INFECTION, UNSPECIFIED TYPE: ICD-10-CM

## 2024-02-23 DIAGNOSIS — M06.4 UNDIFFERENTIATED INFLAMMATORY ARTHRITIS (HCC): ICD-10-CM

## 2024-02-23 DIAGNOSIS — J45.909 REACTIVE AIRWAY DISEASE WITHOUT COMPLICATION, UNSPECIFIED ASTHMA SEVERITY, UNSPECIFIED WHETHER PERSISTENT: ICD-10-CM

## 2024-02-23 DIAGNOSIS — G31.9 CEREBRAL ATROPHY (HCC): ICD-10-CM

## 2024-02-23 DIAGNOSIS — K21.9 GASTROESOPHAGEAL REFLUX DISEASE, UNSPECIFIED WHETHER ESOPHAGITIS PRESENT: ICD-10-CM

## 2024-02-23 DIAGNOSIS — M35.3 POLYMYALGIA RHEUMATICA (HCC): ICD-10-CM

## 2024-02-23 PROCEDURE — 99214 OFFICE O/P EST MOD 30 MIN: CPT | Performed by: STUDENT IN AN ORGANIZED HEALTH CARE EDUCATION/TRAINING PROGRAM

## 2024-02-23 PROCEDURE — 3074F SYST BP LT 130 MM HG: CPT | Performed by: STUDENT IN AN ORGANIZED HEALTH CARE EDUCATION/TRAINING PROGRAM

## 2024-02-23 PROCEDURE — 3078F DIAST BP <80 MM HG: CPT | Performed by: STUDENT IN AN ORGANIZED HEALTH CARE EDUCATION/TRAINING PROGRAM

## 2024-02-23 RX ORDER — METHYLPREDNISOLONE 4 MG/1
TABLET ORAL
Qty: 21 TABLET | Refills: 0 | Status: SHIPPED | OUTPATIENT
Start: 2024-02-23 | End: 2024-03-13

## 2024-02-23 RX ORDER — AMOXICILLIN AND CLAVULANATE POTASSIUM 875; 125 MG/1; MG/1
1 TABLET, FILM COATED ORAL 2 TIMES DAILY
Qty: 10 TABLET | Refills: 0 | Status: SHIPPED | OUTPATIENT
Start: 2024-02-23 | End: 2024-03-13

## 2024-02-23 RX ORDER — METHYLPREDNISOLONE 4 MG/1
TABLET ORAL
Qty: 21 TABLET | Refills: 0 | Status: SHIPPED
Start: 2024-02-23 | End: 2024-02-23

## 2024-02-23 RX ORDER — AMOXICILLIN AND CLAVULANATE POTASSIUM 875; 125 MG/1; MG/1
1 TABLET, FILM COATED ORAL 2 TIMES DAILY
Qty: 10 TABLET | Refills: 0 | Status: SHIPPED
Start: 2024-02-23 | End: 2024-02-23

## 2024-02-23 ASSESSMENT — ENCOUNTER SYMPTOMS
COUGH: 1
CHILLS: 0
SHORTNESS OF BREATH: 0
FEVER: 0
PALPITATIONS: 0
WHEEZING: 0

## 2024-02-23 ASSESSMENT — FIBROSIS 4 INDEX: FIB4 SCORE: 2.09

## 2024-02-23 NOTE — PROGRESS NOTES
"Subjective:     CC:     HPI:   Iveth presents today with    PMH HTN, Hyperlipidemia, GERD, PMR, Prediabetes, CKD, atherosclerosis, insomnia ( on ambien since ) , reactive airway disease, hx of covid     Cold 7 days ago  - no sick contact  - started Thursday  - sneeze, pharyngitis, green mucus and phlegm.   - she has constant cough  - she has been using blayne vapor rub   - she is cough at night  - she has previous hospitalization for reactive airway disease and cold requiring duoneb, steroids, home oxygen x 2 week    - Arnuity ellipta daily, albuterol 2 puff in morning or night - has not required increase use of albuterol, denies significant wheezing.   - hx of acid reflux, gastric ulcer following GI taking PPI taking 40mg bid   - nasal spray discussed      Health Maintenance:     ROS:  Review of Systems   Constitutional:  Negative for chills and fever.   Respiratory:  Positive for cough. Negative for shortness of breath and wheezing.    Cardiovascular:  Negative for chest pain and palpitations.       Objective:     Exam:  /76 (BP Location: Left arm, Patient Position: Sitting)   Pulse 89   Temp 36.4 °C (97.5 °F) (Temporal)   Ht 1.613 m (5' 3.5\")   Wt 81.6 kg (180 lb)   SpO2 93%   BMI 31.39 kg/m²  Body mass index is 31.39 kg/m².    Physical Exam  Constitutional:       Appearance: Normal appearance.   Cardiovascular:      Rate and Rhythm: Normal rate and regular rhythm.   Pulmonary:      Effort: Pulmonary effort is normal.      Breath sounds: Normal breath sounds.   Neurological:      Mental Status: She is alert.         Labs:     Assessment & Plan:     89 y.o. female with the following -     1. Upper respiratory tract infection, unspecified type  2. Acute cough  3. Reactive airway disease without complication, unspecified asthma severity, unspecified whether persistent  Acute stable  Discussed viral nature of URI, however, given age, persistent symptoms and hx of reactive airway disease will sent in medrol " dose pack and augmentin .     4. Gastroesophageal reflux disease, unspecified whether esophagitis present  Chronic stable  Follows with GI  On ppi 40mg BID    5. Polymyalgia rheumatica (HCC)  Chronic stable    6. Undifferentiated inflammatory arthritis (HCC)  Chronic stable    7. Cerebral atrophy (HCC)  Chronic stable      HCC Gap Form    Diagnosis to address: M35.3 - Polymyalgia rheumatica (HCC)  Assessment and plan: Chronic, stable, as based on today's assessment and impact on other conditions evaluated today. Continue with current treatment plan: follows with rheumatology on prednisone 1mg daily  Follow-up with specialist as directed, but at least annually.  Diagnosis: M06.4 - Undifferentiated inflammatory arthritis (HCC)  Assessment and plan: Chronic, stable, as based on today's assessment and impact on other conditions evaluated today. Continue with current treatment plan: follows with rheumatology on prednisone 1mg daily  Follow-up with specialist as directed, but at least annually.    Diagnosis: G31.9 - Cerebral atrophy (HCC)  Assessment and plan: Chronic, stable. Continue with current defined treatment plan: noted on ct scan 2013. Follow-up at least annually.  Last edited 02/23/24 11:18 PST by Ryan Waterman M.D.           Return if symptoms worsen or fail to improve.    Please note that this dictation was created using voice recognition software. I have made every reasonable attempt to correct obvious errors, but I expect that there are errors of grammar and possibly content that I did not discover before finalizing the note.

## 2024-02-29 ENCOUNTER — TELEPHONE (OUTPATIENT)
Dept: HEALTH INFORMATION MANAGEMENT | Facility: OTHER | Age: 89
End: 2024-02-29
Payer: MEDICARE

## 2024-02-29 DIAGNOSIS — R05.1 ACUTE COUGH: ICD-10-CM

## 2024-02-29 RX ORDER — BENZONATATE 100 MG/1
100 CAPSULE ORAL 3 TIMES DAILY PRN
Qty: 60 CAPSULE | Refills: 0 | Status: SHIPPED | OUTPATIENT
Start: 2024-02-29 | End: 2024-03-13

## 2024-02-29 NOTE — TELEPHONE ENCOUNTER
"Iveth left message asking for a return call, reporting a continued cough. Returned Iveth's call. She reports a bothersome cough. She denies a fever or chills. She denies any shortness of breath. She denies a sore throat. She feels as though her \"cold\" is gone but constant cough has remained. Will follow up with Dr. Waterman.   "

## 2024-03-01 NOTE — TELEPHONE ENCOUNTER
Iveth called and reported she spoke with Dr. Waterman. Discussed her new prescription. Encouraged Iveth to call with any questions or concerns.

## 2024-03-08 ENCOUNTER — TELEPHONE (OUTPATIENT)
Dept: HEALTH INFORMATION MANAGEMENT | Facility: OTHER | Age: 89
End: 2024-03-08
Payer: MEDICARE

## 2024-03-08 NOTE — TELEPHONE ENCOUNTER
Iveth left a message. Returned her call. She reports her cough is greatly improved and she is feeling much better. Reviewed her next PCP appointment on 3/13 with Dr. Waterman. She state she will review question with him regarding OTC allergy medications.  She denied any needs a this time. Encouraged to call with any questions or concerns.

## 2024-03-13 ENCOUNTER — OFFICE VISIT (OUTPATIENT)
Dept: MEDICAL GROUP | Facility: MEDICAL CENTER | Age: 89
End: 2024-03-13
Payer: MEDICARE

## 2024-03-13 VITALS
HEART RATE: 76 BPM | WEIGHT: 180 LBS | DIASTOLIC BLOOD PRESSURE: 66 MMHG | HEIGHT: 64 IN | BODY MASS INDEX: 30.73 KG/M2 | TEMPERATURE: 96.9 F | SYSTOLIC BLOOD PRESSURE: 122 MMHG | OXYGEN SATURATION: 96 %

## 2024-03-13 DIAGNOSIS — I70.0 ATHEROSCLEROSIS OF AORTA (HCC): ICD-10-CM

## 2024-03-13 DIAGNOSIS — G47.00 INSOMNIA, UNSPECIFIED TYPE: ICD-10-CM

## 2024-03-13 DIAGNOSIS — I10 PRIMARY HYPERTENSION: ICD-10-CM

## 2024-03-13 DIAGNOSIS — Z85.828 HX OF NONMELANOMA SKIN CANCER: ICD-10-CM

## 2024-03-13 DIAGNOSIS — E78.00 HYPERCHOLESTEROLEMIA: ICD-10-CM

## 2024-03-13 DIAGNOSIS — M35.3 POLYMYALGIA RHEUMATICA (HCC): ICD-10-CM

## 2024-03-13 DIAGNOSIS — E66.9 OBESITY (BMI 30-39.9): ICD-10-CM

## 2024-03-13 DIAGNOSIS — L98.9 SKIN LESION: ICD-10-CM

## 2024-03-13 DIAGNOSIS — R73.03 PREDIABETES: ICD-10-CM

## 2024-03-13 PROBLEM — E66.811 OBESITY (BMI 30.0-34.9): Status: RESOLVED | Noted: 2021-12-08 | Resolved: 2024-03-13

## 2024-03-13 PROBLEM — F51.01 PRIMARY INSOMNIA: Status: ACTIVE | Noted: 2021-12-08

## 2024-03-13 PROCEDURE — 99214 OFFICE O/P EST MOD 30 MIN: CPT | Performed by: STUDENT IN AN ORGANIZED HEALTH CARE EDUCATION/TRAINING PROGRAM

## 2024-03-13 PROCEDURE — 3074F SYST BP LT 130 MM HG: CPT | Performed by: STUDENT IN AN ORGANIZED HEALTH CARE EDUCATION/TRAINING PROGRAM

## 2024-03-13 PROCEDURE — 3078F DIAST BP <80 MM HG: CPT | Performed by: STUDENT IN AN ORGANIZED HEALTH CARE EDUCATION/TRAINING PROGRAM

## 2024-03-13 RX ORDER — IBUPROFEN 600 MG/1
600 TABLET ORAL
COMMUNITY

## 2024-03-13 RX ORDER — ZOLPIDEM TARTRATE 5 MG/1
5 TABLET ORAL NIGHTLY PRN
Qty: 90 TABLET | Refills: 1 | Status: SHIPPED | OUTPATIENT
Start: 2024-04-19 | End: 2024-10-16

## 2024-03-13 ASSESSMENT — FIBROSIS 4 INDEX: FIB4 SCORE: 2.09

## 2024-03-13 ASSESSMENT — ENCOUNTER SYMPTOMS
NAUSEA: 0
FEVER: 0
PALPITATIONS: 0
VOMITING: 0
WHEEZING: 0
HEADACHES: 0
SHORTNESS OF BREATH: 0
WEIGHT LOSS: 0
DIZZINESS: 0
CHILLS: 0

## 2024-03-13 NOTE — PROGRESS NOTES
"Subjective:     CC:     HPI:   vIeth presents today with    Former patient of Ryan Waterman M.D.  Present to establish    Patient is a pianist  PMH HTN, PMR/arthralgia on 1md prednisone daily, chronic back pain( gabapentin 200AM, 300mg PM), HLD- lovastatin, primary insomnia ( on zolpidem since 1997), GERD/hx esophageal ulcer/ hiatal hernia require omeprazole 40mg BID, hx of skin lesion ( scc, bcc)     # Chronic pain   robaxin 750 mg night prn   Gabapentin 200mg Am, 300mg PM     # GERD   Omeprazole 40mg BID    PMR  - prednisone 1 mg daily     # primary insomnia - sleep maintenance  -on zolpidem chronically         Problem   Obesity (Bmi 30-39.9)   Primary Insomnia   Obesity (Bmi 30.0-34.9) (Resolved)       Health Maintenance:     ROS:  Review of Systems   Constitutional:  Negative for chills, fever and weight loss.   HENT:  Negative for hearing loss.    Respiratory:  Negative for shortness of breath and wheezing.    Cardiovascular:  Negative for chest pain and palpitations.   Gastrointestinal:  Negative for nausea and vomiting.   Genitourinary:  Negative for frequency and urgency.   Skin:  Negative for rash.   Neurological:  Negative for dizziness and headaches.       Objective:     Exam:  /66 (BP Location: Left arm, Patient Position: Sitting)   Pulse 76   Temp 36.1 °C (96.9 °F) (Temporal)   Ht 1.613 m (5' 3.5\")   Wt 81.6 kg (180 lb)   SpO2 96%   BMI 31.39 kg/m²  Body mass index is 31.39 kg/m².    Physical Exam  Constitutional:       Appearance: Normal appearance.   Cardiovascular:      Heart sounds: No murmur heard.  Pulmonary:      Effort: Pulmonary effort is normal.      Breath sounds: Normal breath sounds. No wheezing.   Musculoskeletal:      Cervical back: Normal range of motion and neck supple.   Neurological:      Mental Status: She is alert.           Labs:     Assessment & Plan:     89 y.o. female with the following -       1. Obesity (BMI 30-39.9)  Chronic stable  - Patient identified as " having weight management issue.  Appropriate orders and counseling given.  - Lipid Profile; Future  - TSH WITH REFLEX TO FT4; Future  - Basic Metabolic Panel; Future  - CBC WITHOUT DIFFERENTIAL; Future    2. Prediabetes  Chronic stable in setting of daily prednisone 1mg   Eats healthy diet  - Lipid Profile; Future  - TSH WITH REFLEX TO FT4; Future  - Basic Metabolic Panel; Future  - CBC WITHOUT DIFFERENTIAL; Future    3. Polymyalgia rheumatica (HCC)  Follows with specialist  - Lipid Profile; Future  - TSH WITH REFLEX TO FT4; Future  - Basic Metabolic Panel; Future  - CBC WITHOUT DIFFERENTIAL; Future    4. Primary hypertension  Chronic stable on hctz 25mg and losartan 50mg daily   - Lipid Profile; Future  - TSH WITH REFLEX TO FT4; Future  - Basic Metabolic Panel; Future  - CBC WITHOUT DIFFERENTIAL; Future    5. Hypercholesterolemia  Chronic stable  On lovastatin 40mg daily taking without a.e  - Lipid Profile; Future  - TSH WITH REFLEX TO FT4; Future  - Basic Metabolic Panel; Future  - CBC WITHOUT DIFFERENTIAL; Future    6. Atherosclerosis of aorta (HCC)  On statin    7. Skin lesion  Chronic, stable  Hx of SCC and BCC of face  Noted numerous SK on exam  - Referral to Dermatology    8. Hx of nonmelanoma skin cancer  - Referral to Dermatology      9. Primary insomnia  Been on zolipidem 5 mg since 1990s        Return in about 6 months (around 9/13/2024) for Controlled Substance management, Lab review.    Please note that this dictation was created using voice recognition software. I have made every reasonable attempt to correct obvious errors, but I expect that there are errors of grammar and possibly content that I did not discover before finalizing the note.

## 2024-04-01 ENCOUNTER — PATIENT OUTREACH (OUTPATIENT)
Dept: HEALTH INFORMATION MANAGEMENT | Facility: OTHER | Age: 89
End: 2024-04-01
Payer: MEDICARE

## 2024-04-01 DIAGNOSIS — I10 HYPERTENSION, UNSPECIFIED TYPE: ICD-10-CM

## 2024-04-01 DIAGNOSIS — M79.604 BILATERAL LEG PAIN: ICD-10-CM

## 2024-04-01 DIAGNOSIS — N18.31 CHRONIC KIDNEY DISEASE, STAGE 3A: ICD-10-CM

## 2024-04-01 DIAGNOSIS — M79.605 BILATERAL LEG PAIN: ICD-10-CM

## 2024-04-01 DIAGNOSIS — I70.0 ATHEROSCLEROSIS OF AORTA (HCC): ICD-10-CM

## 2024-04-01 DIAGNOSIS — I10 PRIMARY HYPERTENSION: ICD-10-CM

## 2024-04-01 DIAGNOSIS — N18.2 STAGE 2 CHRONIC KIDNEY DISEASE: ICD-10-CM

## 2024-04-01 NOTE — PROGRESS NOTES
"Assessment  Spoke with Iveth for monthly outreach follow up. Iveth report she is feeling \"quite well.\" Iveth states her appointment with Dr. Waterman went well on 3/13. Her blood pressure was 122/62. Discussed her next follow up. Her social activities, choir and piano engagements. Wished Iveth a happy early birthday. Discussed plans for up upcoming birthday. Discussed her up coming dermatology visit. Iveth denied any needs at this time. Encouraged to call with any questions or concerns.     Education  Discussed upcoming dermatology visit  Discussed last PCP follow on 3/13  Discussed social activities     Plan of Care and Goals  Continue to remain active   c  Barriers:  Age  Progression of disease process  Knowledge of healthcare  Habits     Progress:  Progressing     Next outreach:  1 month       Chart reviewed for Quarterly assessment and Annual Case Review, patient continues to qualify and would benefit from PCM program.                            Chart reviewed for Quarterly assessment and Annual Case Review, patient continues to qualify and would benefit from PCM program.   "

## 2024-04-10 ENCOUNTER — OFFICE VISIT (OUTPATIENT)
Dept: DERMATOLOGY | Facility: IMAGING CENTER | Age: 89
End: 2024-04-10
Payer: MEDICARE

## 2024-04-10 DIAGNOSIS — L82.1 SK (SEBORRHEIC KERATOSIS): ICD-10-CM

## 2024-04-10 DIAGNOSIS — Z12.83 SKIN CANCER SCREENING: ICD-10-CM

## 2024-04-10 DIAGNOSIS — L81.4 LENTIGINES: ICD-10-CM

## 2024-04-10 DIAGNOSIS — L91.8 ACROCHORDON: ICD-10-CM

## 2024-04-10 DIAGNOSIS — D18.01 CHERRY ANGIOMA: ICD-10-CM

## 2024-04-10 DIAGNOSIS — D22.9 MULTIPLE NEVI: ICD-10-CM

## 2024-04-10 PROCEDURE — 99213 OFFICE O/P EST LOW 20 MIN: CPT | Performed by: NURSE PRACTITIONER

## 2024-04-10 NOTE — PROGRESS NOTES
"DERMATOLOGY NOTE  NEW VISIT       Chief complaint: Establish Care     KHUSHI         History of skin cancer: Yes, Details: SCC nose 1995 , BCC left cheek 1970  History of precancers/actinic keratoses: Yes, Details: back   History of biopsies:Yes, Details:    History of blistering/severe sunburns:Yes, Details: young adult   Family history of skin cancer:No  Family history of atypical moles:No      Allergies   Allergen Reactions    Celecoxib Unspecified     GERD reaction    Lisinopril Cough    Nsaids Unspecified     GERD reaction    Other Drug Unspecified     bioxin    Benadryl Allergy Unspecified     \"tongue gets fuzzy and feel like I am drugged\"    Clarithromycin Vomiting        MEDICATIONS:  Medications relevant to specialty reviewed.     REVIEW OF SYSTEMS:   Positive for skin (see HPI)  Negative for fevers and chills       EXAM:  There were no vitals taken for this visit.  Constitutional: Well-developed, well-nourished, and in no distress.     A total body skin exam was performed excluding the genitals per patient preference and including the following areas: head (including face), neck, chest, abdomen, groin/buttocks, back, bilateral upper extremities, and bilateral lower extremities with the following pertinent findings listed below and/or in assessment/plan.   -sun exposed skin of trunk and b/l upper, lower extremities and face with scattered clinically benign light brown reticulated macules all of which were morphologically similar and none of which were suspicious for skin cancer today on exam  Multiple tan light brown skin-colored macules papules scattered over the trunk >> extremities  Several tan medium brown stuck-on waxy papules scattered on the trunk, scalp, face and extremities  Several scattered 1-3mm bright red macules and thin papules on the trunk and extremities  Epidermal cyst on chest   Skin tags neck line       IMPRESSION / PLAN:    1. Lentigines  - Benign-appearing nature of lesions discussed " during exam.   - Advised to continue to monitor for any return to clinic for new or concerning changes.      2. Multiple nevi  - Benign-appearing nature of lesions discussed during exam.   - Advised to continue to monitor for any return to clinic for new or concerning changes.  - ABCDE's of melanoma discussed/handout given      3. SK (seborrheic keratosis)  - Benign-appearing nature of lesions discussed during exam.   - Advised to continue to monitor for any return to clinic for new or concerning changes.      4. Cherry angioma  - Benign-appearing nature of lesions discussed during exam.   - Advised to continue to monitor for any return to clinic for new or concerning changes.      5. Acrochordon  - Benign-appearing nature of lesions discussed during exam.   - Advised to continue to monitor for any return to clinic for new or concerning changes.      6. Skin cancer screening  Skin cancer education  discussed importance of sun protective clothing, eyewear in addition to the use of broad spectrum sunscreen with SPF 30 or greater, as well as need for reapplication ~every 2 hours when exposed to UVR/handout given  discussed importance following up for any new or changing lesions as noted in handout given, but every 12 months exams in clinic in the setting of dermatologic history  ABCDE's of melanoma discussed/handout given                Please note that this dictation was created using voice recognition software. I have made every reasonable attempt to correct obvious errors, but I expect that there are errors of grammar and possibly content that I did not discover before finalizing the note.      Return to clinic in: Return in about 1 year (around 4/10/2025) for KUHSHI. and as needed for any new or changing skin lesions.

## 2024-04-15 ENCOUNTER — TELEPHONE (OUTPATIENT)
Dept: HEALTH INFORMATION MANAGEMENT | Facility: OTHER | Age: 89
End: 2024-04-15
Payer: MEDICARE

## 2024-04-15 NOTE — TELEPHONE ENCOUNTER
Iveth left a message that she needs a refill for her zolpidem prescription. Confirmed with [facility] prescription is available. Will be refilled today. Returned Iveth call, let her know prescription will be available at [facility] today. Iveth verbalized understanding. She denies any other needs at this time. Encouraged to call with any questions or concerns.

## 2024-05-01 ENCOUNTER — PATIENT OUTREACH (OUTPATIENT)
Dept: HEALTH INFORMATION MANAGEMENT | Facility: OTHER | Age: 89
End: 2024-05-01
Payer: MEDICARE

## 2024-05-01 DIAGNOSIS — N18.2 STAGE 2 CHRONIC KIDNEY DISEASE: ICD-10-CM

## 2024-05-01 DIAGNOSIS — I10 PRIMARY HYPERTENSION: ICD-10-CM

## 2024-05-01 DIAGNOSIS — N18.31 CHRONIC KIDNEY DISEASE, STAGE 3A: ICD-10-CM

## 2024-05-01 DIAGNOSIS — I10 HYPERTENSION, UNSPECIFIED TYPE: ICD-10-CM

## 2024-05-01 DIAGNOSIS — M79.604 BILATERAL LEG PAIN: ICD-10-CM

## 2024-05-01 DIAGNOSIS — M79.605 BILATERAL LEG PAIN: ICD-10-CM

## 2024-05-01 DIAGNOSIS — I70.0 ATHEROSCLEROSIS OF AORTA (HCC): ICD-10-CM

## 2024-05-03 NOTE — PROGRESS NOTES
Assessment  Spoke with Iveth for monthly outreach follow up. Iveth states she is doing well. She denies any coughing. She continues to feel well. She is active friends and continues to be very active the community with music. Discussed her next PCP appointment. She denies any needs at this time. Encouraged to call with any questions or concerns.     Education  Discussed hobbies and social activities     Plan of Care and Goals  Continue to remain active     Barriers:  Age  Knowledge of healthcare  Progression of disease process  Habits     Progress:  Progressing     Next outreach:  1 month

## 2024-05-28 ENCOUNTER — TELEPHONE (OUTPATIENT)
Dept: HEALTH INFORMATION MANAGEMENT | Facility: OTHER | Age: 89
End: 2024-05-28
Payer: MEDICARE

## 2024-05-28 RX ORDER — ALBUTEROL SULFATE 90 UG/1
2 AEROSOL, METERED RESPIRATORY (INHALATION) EVERY 6 HOURS PRN
Qty: 8.5 EACH | Refills: 11 | Status: SHIPPED | OUTPATIENT
Start: 2024-05-28

## 2024-05-28 RX ORDER — ALBUTEROL SULFATE 90 UG/1
2 AEROSOL, METERED RESPIRATORY (INHALATION) EVERY 6 HOURS PRN
Qty: 8.5 EACH | Refills: 1 | Status: SHIPPED | OUTPATIENT
Start: 2024-05-28 | End: 2024-05-28 | Stop reason: SDUPTHER

## 2024-05-28 RX ORDER — LOVASTATIN 40 MG/1
40 TABLET ORAL EVERY EVENING
Qty: 100 TABLET | Refills: 3 | Status: SHIPPED | OUTPATIENT
Start: 2024-05-28

## 2024-05-28 NOTE — TELEPHONE ENCOUNTER
Current bp medications:   Disp Refills Start End    hydrochlorothiazide (HYDRODIURIL) 50 MG tablet 45 tablet 0 4/21/2022     Sig: TAKE 1/2 TABLET EVERY DAY       Disp Refills Start End    metoPROLOL succinate (TOPROL-XL) 50 MG 24 hr tablet   4/14/2022     Sig - Route: Take 2 tablets by mouth daily. - Oral    Class: Historical Med       Disp Refills Start End    valsartan (Diovan) 160 MG tablet   4/14/2022     Sig - Route: Take 1 tablet by mouth daily. - Oral    Class: Historical Med      Pt came in today for a bp check.  Pt took bp medications about 1 hour piror to coming to the clinic today. He usually takes them around 130pm every day.  I asked if he was having any dizziness/lightheadedness and his response was \" not anymore.\"  He said his fatigue is getting better now with the weather.   He said his left leg has not changed in regards to swelling in ankle and shin. I did asses the left shin and it was still 2+ pitting edema (same as visit from 4/12/22). Refused a cardiology referral again today    Pt will have urinalysis and bmp done today at labs    First reading was 162/80, pulse 63 bpm, 02 sat- 99% room air  Pt sat in exam room for 10 minutes.     Second reading was 162/80      Pharmacy [facility]        Please advise       Received request via: Patient    Was the patient seen in the last year in this department? Yes    Does the patient have an active prescription (recently filled or refills available) for medication(s) requested? No    Pharmacy Name: [facility] [address]     Does the patient have assisted Plus and need 100 day supply (blood pressure, diabetes and cholesterol meds only)? Yes, quantity updated to 100 days

## 2024-05-28 NOTE — TELEPHONE ENCOUNTER
Received request via: Pharmacy    Was the patient seen in the last year in this department? Yes    Does the patient have an active prescription (recently filled or refills available) for medication(s) requested? No    [facility]    Does the patient have [facility] Plus and need 100 day supply (blood pressure, diabetes and cholesterol meds only)? Medication is not for cholesterol, blood pressure or diabetes

## 2024-05-28 NOTE — TELEPHONE ENCOUNTER
Returned Iveth's call to confirm refills for albuterol and lovastatin have been sent to [facility] on [address]. Iveth verbalized understanding

## 2024-06-10 ENCOUNTER — PATIENT OUTREACH (OUTPATIENT)
Dept: HEALTH INFORMATION MANAGEMENT | Facility: OTHER | Age: 89
End: 2024-06-10
Payer: MEDICARE

## 2024-06-10 DIAGNOSIS — I70.0 ATHEROSCLEROSIS OF AORTA (HCC): ICD-10-CM

## 2024-06-10 DIAGNOSIS — N18.2 STAGE 2 CHRONIC KIDNEY DISEASE: ICD-10-CM

## 2024-06-10 DIAGNOSIS — I10 HYPERTENSION, UNSPECIFIED TYPE: ICD-10-CM

## 2024-06-10 DIAGNOSIS — M79.604 BILATERAL LEG PAIN: ICD-10-CM

## 2024-06-10 DIAGNOSIS — N18.31 CHRONIC KIDNEY DISEASE, STAGE 3A: ICD-10-CM

## 2024-06-10 DIAGNOSIS — M79.605 BILATERAL LEG PAIN: ICD-10-CM

## 2024-06-10 DIAGNOSIS — I10 PRIMARY HYPERTENSION: ICD-10-CM

## 2024-06-12 NOTE — PROGRESS NOTES
Three attempts for monthly outreach follow up. No answer, left messages, will follow up at next monthly outreach.

## 2024-06-14 NOTE — PROGRESS NOTES
Assessment  Iveth returned all for monthly outreach follow up. Iveth reports she is doing well. Dicussed recent pain management follow up-MRI orders and upcoming epidural steroid injections. Discussed pending lab orders. Discussed fasting labs. Discussed social opportunities and activity level. 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 She denies any needs at this time. Encouraged to call with any questions or concerns.     Education  Discussed activity level-piano rehearsals and concerts   Discussed pending imaging and lab orders  Discussed fasting labs     Plan of Care and Goals  Continue to remain active     Barriers:  Age  Progression of disease process   Knowledge of healthcare  Habits     Progress:  Progressing     Next outreach:  1 month

## 2024-06-17 ENCOUNTER — HOSPITAL ENCOUNTER (OUTPATIENT)
Dept: LAB | Facility: MEDICAL CENTER | Age: 89
End: 2024-06-17
Attending: STUDENT IN AN ORGANIZED HEALTH CARE EDUCATION/TRAINING PROGRAM
Payer: MEDICARE

## 2024-06-17 DIAGNOSIS — M35.3 POLYMYALGIA RHEUMATICA (HCC): ICD-10-CM

## 2024-06-17 DIAGNOSIS — E66.9 OBESITY (BMI 30-39.9): ICD-10-CM

## 2024-06-17 DIAGNOSIS — E78.00 HYPERCHOLESTEROLEMIA: ICD-10-CM

## 2024-06-17 DIAGNOSIS — D72.829 LEUKOCYTOSIS, UNSPECIFIED TYPE: ICD-10-CM

## 2024-06-17 DIAGNOSIS — I10 PRIMARY HYPERTENSION: ICD-10-CM

## 2024-06-17 DIAGNOSIS — R73.03 PREDIABETES: ICD-10-CM

## 2024-06-17 LAB
ANION GAP SERPL CALC-SCNC: 13 MMOL/L (ref 7–16)
BUN SERPL-MCNC: 19 MG/DL (ref 8–22)
CALCIUM SERPL-MCNC: 9.5 MG/DL (ref 8.5–10.5)
CHLORIDE SERPL-SCNC: 98 MMOL/L (ref 96–112)
CHOLEST SERPL-MCNC: 184 MG/DL (ref 100–199)
CO2 SERPL-SCNC: 27 MMOL/L (ref 20–33)
CREAT SERPL-MCNC: 0.82 MG/DL (ref 0.5–1.4)
ERYTHROCYTE [DISTWIDTH] IN BLOOD BY AUTOMATED COUNT: 47.4 FL (ref 35.9–50)
FASTING STATUS PATIENT QL REPORTED: NORMAL
GFR SERPLBLD CREATININE-BSD FMLA CKD-EPI: 67 ML/MIN/1.73 M 2
GLUCOSE SERPL-MCNC: 104 MG/DL (ref 65–99)
HCT VFR BLD AUTO: 45.5 % (ref 37–47)
HDLC SERPL-MCNC: 64 MG/DL
HGB BLD-MCNC: 15.1 G/DL (ref 12–16)
LDLC SERPL CALC-MCNC: 95 MG/DL
MCH RBC QN AUTO: 30.2 PG (ref 27–33)
MCHC RBC AUTO-ENTMCNC: 33.2 G/DL (ref 32.2–35.5)
MCV RBC AUTO: 91 FL (ref 81.4–97.8)
PLATELET # BLD AUTO: 391 K/UL (ref 164–446)
PMV BLD AUTO: 10.5 FL (ref 9–12.9)
POTASSIUM SERPL-SCNC: 3.6 MMOL/L (ref 3.6–5.5)
RBC # BLD AUTO: 5 M/UL (ref 4.2–5.4)
SODIUM SERPL-SCNC: 138 MMOL/L (ref 135–145)
TRIGL SERPL-MCNC: 123 MG/DL (ref 0–149)
TSH SERPL DL<=0.005 MIU/L-ACNC: 1.95 UIU/ML (ref 0.38–5.33)
WBC # BLD AUTO: 14.8 K/UL (ref 4.8–10.8)

## 2024-06-17 PROCEDURE — 85027 COMPLETE CBC AUTOMATED: CPT

## 2024-06-17 PROCEDURE — 36415 COLL VENOUS BLD VENIPUNCTURE: CPT

## 2024-06-17 PROCEDURE — 84443 ASSAY THYROID STIM HORMONE: CPT

## 2024-06-17 PROCEDURE — 80061 LIPID PANEL: CPT

## 2024-06-17 PROCEDURE — 80048 BASIC METABOLIC PNL TOTAL CA: CPT

## 2024-07-09 ENCOUNTER — PATIENT OUTREACH (OUTPATIENT)
Dept: HEALTH INFORMATION MANAGEMENT | Facility: OTHER | Age: 89
End: 2024-07-09
Payer: MEDICARE

## 2024-07-09 DIAGNOSIS — I10 HYPERTENSION, UNSPECIFIED TYPE: ICD-10-CM

## 2024-07-09 DIAGNOSIS — N18.2 STAGE 2 CHRONIC KIDNEY DISEASE: ICD-10-CM

## 2024-07-09 DIAGNOSIS — N18.31 CHRONIC KIDNEY DISEASE, STAGE 3A: ICD-10-CM

## 2024-07-09 DIAGNOSIS — I70.0 ATHEROSCLEROSIS OF AORTA (HCC): ICD-10-CM

## 2024-07-09 DIAGNOSIS — I10 PRIMARY HYPERTENSION: ICD-10-CM

## 2024-07-09 DIAGNOSIS — M79.605 BILATERAL LEG PAIN: ICD-10-CM

## 2024-07-09 DIAGNOSIS — M79.604 BILATERAL LEG PAIN: ICD-10-CM

## 2024-07-31 ENCOUNTER — HOSPITAL ENCOUNTER (OUTPATIENT)
Dept: RADIOLOGY | Facility: MEDICAL CENTER | Age: 89
End: 2024-07-31
Attending: PHYSICAL MEDICINE & REHABILITATION
Payer: MEDICARE

## 2024-07-31 DIAGNOSIS — M51.36 DEGENERATION OF LUMBAR INTERVERTEBRAL DISC: ICD-10-CM

## 2024-07-31 PROCEDURE — 72148 MRI LUMBAR SPINE W/O DYE: CPT

## 2024-08-13 ENCOUNTER — PATIENT OUTREACH (OUTPATIENT)
Dept: HEALTH INFORMATION MANAGEMENT | Facility: OTHER | Age: 89
End: 2024-08-13
Payer: MEDICARE

## 2024-08-13 DIAGNOSIS — I10 PRIMARY HYPERTENSION: ICD-10-CM

## 2024-08-13 DIAGNOSIS — I70.0 ATHEROSCLEROSIS OF AORTA (HCC): ICD-10-CM

## 2024-08-13 DIAGNOSIS — N18.31 CHRONIC KIDNEY DISEASE, STAGE 3A: ICD-10-CM

## 2024-08-13 DIAGNOSIS — N18.2 STAGE 2 CHRONIC KIDNEY DISEASE: ICD-10-CM

## 2024-08-13 DIAGNOSIS — I10 HYPERTENSION, UNSPECIFIED TYPE: ICD-10-CM

## 2024-08-13 DIAGNOSIS — M79.604 BILATERAL LEG PAIN: ICD-10-CM

## 2024-08-13 DIAGNOSIS — M79.605 BILATERAL LEG PAIN: ICD-10-CM
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
## 2024-08-14 ENCOUNTER — HOSPITAL ENCOUNTER (OUTPATIENT)
Dept: LAB | Facility: MEDICAL CENTER | Age: 89
End: 2024-08-14
Attending: STUDENT IN AN ORGANIZED HEALTH CARE EDUCATION/TRAINING PROGRAM
Payer: MEDICARE

## 2024-08-14 DIAGNOSIS — D72.829 LEUKOCYTOSIS, UNSPECIFIED TYPE: ICD-10-CM

## 2024-08-14 LAB
BASOPHILS # BLD AUTO: 0.5 % (ref 0–1.8)
BASOPHILS # BLD: 0.04 K/UL (ref 0–0.12)
EOSINOPHIL # BLD AUTO: 0.12 K/UL (ref 0–0.51)
EOSINOPHIL NFR BLD: 1.4 % (ref 0–6.9)
ERYTHROCYTE [DISTWIDTH] IN BLOOD BY AUTOMATED COUNT: 47.8 FL (ref 35.9–50)
HCT VFR BLD AUTO: 42.4 % (ref 37–47)
HGB BLD-MCNC: 13.8 G/DL (ref 12–16)
IMM GRANULOCYTES # BLD AUTO: 0.03 K/UL (ref 0–0.11)
IMM GRANULOCYTES NFR BLD AUTO: 0.3 % (ref 0–0.9)
LYMPHOCYTES # BLD AUTO: 2.43 K/UL (ref 1–4.8)
LYMPHOCYTES NFR BLD: 28.1 % (ref 22–41)
MCH RBC QN AUTO: 29.8 PG (ref 27–33)
MCHC RBC AUTO-ENTMCNC: 32.5 G/DL (ref 32.2–35.5)
MCV RBC AUTO: 91.6 FL (ref 81.4–97.8)
MONOCYTES # BLD AUTO: 0.74 K/UL (ref 0–0.85)
MONOCYTES NFR BLD AUTO: 8.5 % (ref 0–13.4)
NEUTROPHILS # BLD AUTO: 5.3 K/UL (ref 1.82–7.42)
NEUTROPHILS NFR BLD: 61.2 % (ref 44–72)
NRBC # BLD AUTO: 0 K/UL
NRBC BLD-RTO: 0 /100 WBC (ref 0–0.2)
PLATELET # BLD AUTO: 357 K/UL (ref 164–446)
PMV BLD AUTO: 10.9 FL (ref 9–12.9)
RBC # BLD AUTO: 4.63 M/UL (ref 4.2–5.4)
WBC # BLD AUTO: 8.7 K/UL (ref 4.8–10.8)

## 2024-08-14 PROCEDURE — 36415 COLL VENOUS BLD VENIPUNCTURE: CPT

## 2024-08-14 PROCEDURE — 85025 COMPLETE CBC W/AUTO DIFF WBC: CPT

## 2024-08-16 ENCOUNTER — APPOINTMENT (OUTPATIENT)
Dept: RADIOLOGY | Facility: MEDICAL CENTER | Age: 89
End: 2024-08-16
Attending: EMERGENCY MEDICINE
Payer: MEDICARE

## 2024-08-16 ENCOUNTER — HOSPITAL ENCOUNTER (EMERGENCY)
Facility: MEDICAL CENTER | Age: 89
End: 2024-08-16
Attending: EMERGENCY MEDICINE
Payer: MEDICARE

## 2024-08-16 VITALS
BODY MASS INDEX: 31.58 KG/M2 | RESPIRATION RATE: 18 BRPM | DIASTOLIC BLOOD PRESSURE: 77 MMHG | HEIGHT: 64 IN | WEIGHT: 184.97 LBS | HEART RATE: 71 BPM | TEMPERATURE: 97 F | OXYGEN SATURATION: 95 % | SYSTOLIC BLOOD PRESSURE: 126 MMHG

## 2024-08-16 DIAGNOSIS — R10.13 EPIGASTRIC PAIN: ICD-10-CM

## 2024-08-16 LAB
ALBUMIN SERPL BCP-MCNC: 4 G/DL (ref 3.2–4.9)
ALBUMIN/GLOB SERPL: 1.8 G/DL
ALP SERPL-CCNC: 88 U/L (ref 30–99)
ALT SERPL-CCNC: 21 U/L (ref 2–50)
ANION GAP SERPL CALC-SCNC: 12 MMOL/L (ref 7–16)
AST SERPL-CCNC: 27 U/L (ref 12–45)
BASOPHILS # BLD AUTO: 0.3 % (ref 0–1.8)
BASOPHILS # BLD: 0.03 K/UL (ref 0–0.12)
BILIRUB SERPL-MCNC: 0.6 MG/DL (ref 0.1–1.5)
BUN SERPL-MCNC: 11 MG/DL (ref 8–22)
CALCIUM ALBUM COR SERPL-MCNC: 9.1 MG/DL (ref 8.5–10.5)
CALCIUM SERPL-MCNC: 9.1 MG/DL (ref 8.4–10.2)
CHLORIDE SERPL-SCNC: 103 MMOL/L (ref 96–112)
CO2 SERPL-SCNC: 25 MMOL/L (ref 20–33)
CREAT SERPL-MCNC: 0.87 MG/DL (ref 0.5–1.4)
EKG IMPRESSION: NORMAL
EOSINOPHIL # BLD AUTO: 0.15 K/UL (ref 0–0.51)
EOSINOPHIL NFR BLD: 1.6 % (ref 0–6.9)
ERYTHROCYTE [DISTWIDTH] IN BLOOD BY AUTOMATED COUNT: 45 FL (ref 35.9–50)
GFR SERPLBLD CREATININE-BSD FMLA CKD-EPI: 63 ML/MIN/1.73 M 2
GLOBULIN SER CALC-MCNC: 2.2 G/DL (ref 1.9–3.5)
GLUCOSE SERPL-MCNC: 108 MG/DL (ref 65–99)
HCT VFR BLD AUTO: 43.1 % (ref 37–47)
HGB BLD-MCNC: 14.8 G/DL (ref 12–16)
IMM GRANULOCYTES # BLD AUTO: 0.15 K/UL (ref 0–0.11)
IMM GRANULOCYTES NFR BLD AUTO: 1.6 % (ref 0–0.9)
LIPASE SERPL-CCNC: 31 U/L (ref 11–82)
LYMPHOCYTES # BLD AUTO: 1.84 K/UL (ref 1–4.8)
LYMPHOCYTES NFR BLD: 20.2 % (ref 22–41)
MCH RBC QN AUTO: 30.5 PG (ref 27–33)
MCHC RBC AUTO-ENTMCNC: 34.3 G/DL (ref 32.2–35.5)
MCV RBC AUTO: 88.9 FL (ref 81.4–97.8)
MONOCYTES # BLD AUTO: 0.8 K/UL (ref 0–0.85)
MONOCYTES NFR BLD AUTO: 8.8 % (ref 0–13.4)
NEUTROPHILS # BLD AUTO: 6.13 K/UL (ref 1.82–7.42)
NEUTROPHILS NFR BLD: 67.5 % (ref 44–72)
NRBC # BLD AUTO: 0 K/UL
NRBC BLD-RTO: 0 /100 WBC (ref 0–0.2)
PLATELET # BLD AUTO: 343 K/UL (ref 164–446)
PMV BLD AUTO: 10 FL (ref 9–12.9)
POTASSIUM SERPL-SCNC: 3.8 MMOL/L (ref 3.6–5.5)
PROT SERPL-MCNC: 6.2 G/DL (ref 6–8.2)
RBC # BLD AUTO: 4.85 M/UL (ref 4.2–5.4)
SODIUM SERPL-SCNC: 140 MMOL/L (ref 135–145)
TROPONIN T SERPL-MCNC: 15 NG/L (ref 6–19)
TROPONIN T SERPL-MCNC: 16 NG/L (ref 6–19)
WBC # BLD AUTO: 9.1 K/UL (ref 4.8–10.8)

## 2024-08-16 PROCEDURE — 71045 X-RAY EXAM CHEST 1 VIEW: CPT

## 2024-08-16 PROCEDURE — 99285 EMERGENCY DEPT VISIT HI MDM: CPT

## 2024-08-16 PROCEDURE — 83690 ASSAY OF LIPASE: CPT

## 2024-08-16 PROCEDURE — 93005 ELECTROCARDIOGRAM TRACING: CPT | Performed by: EMERGENCY MEDICINE

## 2024-08-16 PROCEDURE — 85025 COMPLETE CBC W/AUTO DIFF WBC: CPT

## 2024-08-16 PROCEDURE — 84484 ASSAY OF TROPONIN QUANT: CPT | Mod: 91

## 2024-08-16 PROCEDURE — 80053 COMPREHEN METABOLIC PANEL: CPT

## 2024-08-16 PROCEDURE — 36415 COLL VENOUS BLD VENIPUNCTURE: CPT

## 2024-08-16 PROCEDURE — 93005 ELECTROCARDIOGRAM TRACING: CPT

## 2024-08-16 ASSESSMENT — PAIN DESCRIPTION - DESCRIPTORS: DESCRIPTORS: CRAMPING

## 2024-08-16 ASSESSMENT — FIBROSIS 4 INDEX: FIB4 SCORE: 2.04

## 2024-08-16 NOTE — DISCHARGE INSTRUCTIONS
The cause of your symptoms is not clear but your workup today is reassuring.  I do want you to go ahead and follow-up with your doctor this upcoming week for recheck.  Should you have return of your symptoms or any turn for the worse, I want to see you back here in the ER right away.

## 2024-08-16 NOTE — ED TRIAGE NOTES
"Chief Complaint   Patient presents with    Chest Pain     Pt states she woke up this morning to a sharp pain in her upper abdomen and chest that migrated upwards slowly over a few hours. Pt states that the pain felt like gas build up near her hiatal hernia.     BP (!) 141/87   Pulse 75   Temp 36.1 °C (97 °F) (Temporal)   Resp 16   Ht 1.613 m (5' 3.5\")   Wt 83.9 kg (184 lb 15.5 oz)   SpO2 93%   BMI 32.25 kg/m²     "

## 2024-08-16 NOTE — ED NOTES
Pt brought in by neighbor. Pt states she has chest pain that started this morning at 05:00. Pt states history of hiatal hernia.

## 2024-08-16 NOTE — ED PROVIDER NOTES
ED Provider Note    CHIEF COMPLAINT  No chief complaint on file.      EXTERNAL RECORDS REVIEWED  Inpatient Notes admitted last June for acute respiratory failure with hypoxia, Outpatient Notes follows in the clinic with her primary care doctor including recent leukocytosis, and Outpatient labs & studies recent CBC was unrevealing, sta thalium from 2-1/2 years ago which was negative for acute ischemic changes.    HPI/ROS  LIMITATION TO HISTORY   Select: : None  OUTSIDE HISTORIAN(S):  Friend who drove her and points out that she is seeing better after she vomited    Iveth Martin is a 89 y.o. female who presents complaining of epigastric discomfort.  The patient states that she has had a large hiatal hernia for very long time, at times gets her problem.  She had similar symptoms today.  She describes a pressure type sensation in the upper part of her belly.  Nothing make it better, nothing made it worse.  She has had worse pain in the past, at that time more severe and more radiating up into her jaw.  She states that she had a workup for her heart, which was all negative.  She was fine when she went to bed last night.  At the symptoms started acutely this morning.  She denies any cough or cold symptoms.  No shortness of breath.  On the way here, she threw up, and now feels back to normal.  No leg pain or swelling.  No other complaint.  She points out that her blood pressure is high today because she has not yet taken her losartan.    PAST MEDICAL HISTORY   has a past medical history of Arthritis, Hyperlipidemia, and Hypertension.    SURGICAL HISTORY   has a past surgical history that includes wrist arthroscop,release xvers lig (Left, 08/11/2021); pb incise finger tendon sheath (Left, 08/11/2021); abdominal hysterectomy total; appendectomy; bowel resection; lumpectomy; and hernia repair.    FAMILY HISTORY  Family History   Problem Relation Age of Onset    Cancer Father     Heart Disease Father     Cancer Mother   "   Cancer Daughter        SOCIAL HISTORY  Social History     Tobacco Use    Smoking status: Former     Current packs/day: 0.00     Average packs/day: 1 pack/day for 30.0 years (30.0 ttl pk-yrs)     Types: Cigarettes     Start date: 1956     Quit date:      Years since quittin.6    Smokeless tobacco: Never   Vaping Use    Vaping status: Never Used   Substance and Sexual Activity    Alcohol use: Not Currently     Comment: rare    Drug use: Never    Sexual activity: Not Currently     Partners: Male       CURRENT MEDICATIONS  Home Medications    **Home medications have not yet been reviewed for this encounter**         ALLERGIES  Allergies   Allergen Reactions    Celecoxib Unspecified     GERD reaction    Lisinopril Cough    Nsaids Unspecified     GERD reaction    Other Drug Unspecified     bioxin    Benadryl Allergy Unspecified     \"tongue gets fuzzy and feel like I am drugged\"    Clarithromycin Vomiting       PHYSICAL EXAM  VITAL SIGNS: BP (!) 141/87   Pulse 75   Temp 36.1 °C (97 °F) (Temporal)   Resp 16   Ht 1.613 m (5' 3.5\")   Wt 83.9 kg (184 lb 15.5 oz)   SpO2 93%   BMI 32.25 kg/m²    Constitutional: Well appearing patient in no acute distress.  HENT: Head is without trauma.  Oropharynx is clear.  Mucous membranes are moist.  Eyes: Sclerae are nonicteric, pupils are equally round.  Neck: Supple with grossly normal range of motion.  Cardiovascular: Heart is regular rate and rhythm without murmur rub or gallop.  Peripheral pulses are intact and symmetric throughout.  Thorax & Lungs: Breathing easily.  Good air movement.  There is no wheeze, rhonchi or rales.  Abdomen: Bowel sounds normal, soft, non-distended, nontender, no mass nor pulsatile mass. I do not appreciate hepatosplenomegaly.  Skin: No apparent rash.  I do not see petechiae or purpura.  Extremities: No evidence of acute trauma.  No clubbing, cyanosis, edema, no Homans or cords.  Neurologic: Alert. Moving all extremities.  Intact " "sensation and strength throughout.  Gait is normal.  Psychiatric: Normal for situation      EKG/LABS  To my interpretation this shows a twelve-lead study showing normal sinus rhythm at a rate of 77.  There are no ST segment or T wave changes.  Impression: No STEMI  I have independently interpreted this EKG  Labs Reviewed   CBC WITH DIFFERENTIAL - Abnormal; Notable for the following components:       Result Value    Lymphocytes 20.20 (*)     Immature Granulocytes 1.60 (*)     Immature Granulocytes (abs) 0.15 (*)     All other components within normal limits   COMP METABOLIC PANEL - Abnormal; Notable for the following components:    Glucose 108 (*)     All other components within normal limits   TROPONIN   TROPONIN   LIPASE   ESTIMATED GFR         RADIOLOGY/PROCEDURES   I have independently interpreted the diagnostic imaging associated with this visit and am waiting the final reading from the radiologist.   My preliminary interpretation is as follows: No infiltrate    Radiologist interpretation:  DX-CHEST-PORTABLE (1 VIEW)   Final Result      No evidence of acute cardiopulmonary process.          COURSE & MEDICAL DECISION MAKING    ASSESSMENT, COURSE AND PLAN  Care Narrative: This patient presents with epigastric discomfort.  Acute in onset.  I pointed out that the acuity makes me less suspicious for a hernia or GERD, however the patient tells me she has had the symptoms in the same pattern previously.  She now feels better after vomiting.  Her belly is completely benign.  I think for now we should go ahead and check some labs on her.  Her EKG is unrevealing.  Concern would be for cardiac ischemia.  The quality of her symptoms does not sound like aortic etiology or pulmonary embolism.    Laboratories are returning.  CBC is unrevealing.  Basic chemistries and renal function are normal.  Blood sugar is mildly bit at 108.  LFTs, lipase are normal.  Troponin is normal.  Upon recheck, she states she feels \"great.\"  She " would like to go home.  We talked about the fact I think it be worthwhile getting a second troponin.  She has cardiovascular risk factors of hypertension and diabetes, both of which are treated.  She also has BMI of 32.    Second troponin has returned similar.  Continues to feel great.  At this point we will go ahead and discharge her home.  She was given generic instructions on chest pain, and the following specific instructions:The cause of your symptoms is not clear but your workup today is reassuring.  I do want you to go ahead and follow-up with your doctor this upcoming week for recheck.  Should you have return of your symptoms or any turn for the worse, I want to see you back here in the ER right away.    DISPOSITION AND DISCUSSIONS    Escalation of care considered, and ultimately not performed:acute inpatient care management, however at this time, the patient is most appropriate for outpatient management      FINAL DIAGNOSIS  1. Epigastric pain         Electronically signed by: Fracisco Rogers M.D., 8/16/2024 7:54 AM

## 2024-08-19 ENCOUNTER — TELEPHONE (OUTPATIENT)
Dept: HEALTH INFORMATION MANAGEMENT | Facility: OTHER | Age: 89
End: 2024-08-19
Payer: MEDICARE

## 2024-08-19 NOTE — TELEPHONE ENCOUNTER
Iveth called to discuss her ER admission on 8/16. Reviewed that the cardiac workup completed in the ER was negative. Offered to assist in making a PCP appointment. She states she has an appointment on 9/13 and felt she could wait until then. She states she is going to call and make an appointment with GI. Encouraged Iveth to call with any questions or concerns.

## 2024-09-12 ENCOUNTER — TELEPHONE (OUTPATIENT)
Dept: HEALTH INFORMATION MANAGEMENT | Facility: OTHER | Age: 89
End: 2024-09-12
Payer: MEDICARE

## 2024-09-13 ENCOUNTER — OFFICE VISIT (OUTPATIENT)
Dept: MEDICAL GROUP | Facility: MEDICAL CENTER | Age: 89
End: 2024-09-13
Payer: MEDICARE

## 2024-09-13 VITALS
OXYGEN SATURATION: 97 % | HEART RATE: 87 BPM | HEIGHT: 64 IN | WEIGHT: 181.66 LBS | SYSTOLIC BLOOD PRESSURE: 114 MMHG | TEMPERATURE: 97.4 F | BODY MASS INDEX: 31.01 KG/M2 | DIASTOLIC BLOOD PRESSURE: 62 MMHG

## 2024-09-13 DIAGNOSIS — I10 PRIMARY HYPERTENSION: ICD-10-CM

## 2024-09-13 DIAGNOSIS — R73.03 PREDIABETES: ICD-10-CM

## 2024-09-13 DIAGNOSIS — G47.00 INSOMNIA, UNSPECIFIED TYPE: ICD-10-CM

## 2024-09-13 DIAGNOSIS — J06.9 UPPER RESPIRATORY TRACT INFECTION, UNSPECIFIED TYPE: ICD-10-CM

## 2024-09-13 PROCEDURE — 99214 OFFICE O/P EST MOD 30 MIN: CPT | Performed by: STUDENT IN AN ORGANIZED HEALTH CARE EDUCATION/TRAINING PROGRAM

## 2024-09-13 PROCEDURE — G2211 COMPLEX E/M VISIT ADD ON: HCPCS | Performed by: STUDENT IN AN ORGANIZED HEALTH CARE EDUCATION/TRAINING PROGRAM

## 2024-09-13 PROCEDURE — 3078F DIAST BP <80 MM HG: CPT | Performed by: STUDENT IN AN ORGANIZED HEALTH CARE EDUCATION/TRAINING PROGRAM

## 2024-09-13 PROCEDURE — 3074F SYST BP LT 130 MM HG: CPT | Performed by: STUDENT IN AN ORGANIZED HEALTH CARE EDUCATION/TRAINING PROGRAM

## 2024-09-13 RX ORDER — ZOLPIDEM TARTRATE 5 MG/1
5 TABLET ORAL NIGHTLY PRN
Qty: 90 TABLET | Refills: 1 | Status: SHIPPED | OUTPATIENT
Start: 2024-10-15 | End: 2025-04-13

## 2024-09-13 ASSESSMENT — ENCOUNTER SYMPTOMS
DIZZINESS: 0
WHEEZING: 0
PALPITATIONS: 0
FEVER: 0
SHORTNESS OF BREATH: 0
HEADACHES: 0
CHILLS: 0
WEIGHT LOSS: 0
NAUSEA: 0
VOMITING: 0

## 2024-09-13 ASSESSMENT — FIBROSIS 4 INDEX: FIB4 SCORE: 1.56

## 2024-09-13 NOTE — TELEPHONE ENCOUNTER
"Iveth left a message asking for a return call. Returned call. Iveth reports she has nasal congestion, a cough, and sneezing. She denies a fever, shortness of breath, or difficulty breathing. She has a pulse ox at home and is monitoring her oxygen levels. She states her levels are \"good.\" She has a PCP appointment scheduled for 9/13. Instructed Iveth to call 911 of she develops any shortness of breath, difficulty breathing, or her oxygen level drop below 90%  "

## 2024-09-13 NOTE — LETTER
[facility]  Ryan Waterman M.D.  [address]  [facility] [address]  Fax: [phone]   Authorization for Release/Disclosure of   Protected Health Information   Name: IVETH KOCH : 4/10/1933 SSN: [id number]   Address: [address] [place] [id number] Phone:    [phone] (home)    I authorize the entity listed below to release/disclose the PHI below to:   [facility]/Ryan Waterman M.D. and Ryan Waterman M.D.   Provider or Entity Name:  Caesar Tamez   Address   City, State, [facility]   Phone:      Fax:     Reason for request: continuity of care   Information to be released:    [  ] LAST COLONOSCOPY,  including any PATH REPORT and follow-up  [  ] LAST FIT/COLOGUARD RESULT [  ] LAST DEXA  [  ] LAST MAMMOGRAM  [  ] LAST PAP  [  ] LAST LABS [  ] RETINA EXAM REPORT  [  ] IMMUNIZATION RECORDS  [  ] Release all info  - Last progress note       [  ] Check here and initial the line next to each item to release ALL health information INCLUDING  _____ Care and treatment for drug and / or alcohol abuse  _____ HIV testing, infection status, or AIDS  _____ Genetic Testing    DATES OF SERVICE OR TIME PERIOD TO BE DISCLOSED: _____________  I understand and acknowledge that:  * This Authorization may be revoked at any time by you in writing, except if your health information has already been used or disclosed.  * Your health information that will be used or disclosed as a result of you signing this authorization could be re-disclosed by the recipient. If this occurs, your re-disclosed health information may no longer be protected by State or Federal laws.  * You may refuse to sign this Authorization. Your refusal will not affect your ability to obtain treatment.  * This Authorization becomes effective upon signing and will  on (date) __________.      If no date is indicated, this Authorization will  one (1) year from the signature date.    Name: Iveth Koch  Signature: Date:    9/13/2024     PLEASE FAX REQUESTED RECORDS BACK TO: [phone]

## 2024-09-13 NOTE — PROGRESS NOTES
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 since 1997), GERD/hx esophageal ulcer/ hiatal hernia require omeprazole 40mg BID, hx of skin lesion ( scc, bcc)   - Patient is a pianist  Specialist  Rheumatology- Mojgan IRWIN  Dermatology   Pain management- Marisol IRWIN    # Chronic pain   robaxin 750 mg night prn   Gabapentin 200mg Am, 300mg PM      # GERD   Omeprazole 40mg BID     # PMR  # Back pain   - she recently stopped the prednisone 1mg daily because she was told she need to hold the medication for epidural   - she has follow up with rheumatology      # primary insomnia - sleep maintenance  -on zolpidem chronically     TODAY  # URI  - developed URI since 3 days ago, sneezing. Report she woke up with coughing and sneezing. She is able to sleep. She has been using conservative management.         Health Maintenance:     ROS:  Review of Systems   Constitutional:  Negative for chills, fever and weight loss.   HENT:  Negative for hearing loss.    Respiratory:  Negative for shortness of breath and wheezing.    Cardiovascular:  Negative for chest pain and palpitations.   Gastrointestinal:  Negative for nausea and vomiting.   Genitourinary:  Negative for frequency and urgency.   Skin:  Negative for rash.   Neurological:  Negative for dizziness and headaches.       Objective:     Exam:  /62 (BP Location: Left arm)   Pulse 87   Temp 36.3 °C (97.4 °F)   Ht 1.613 m (5' 3.5\")   Wt 82.4 kg (181 lb 10.5 oz)   SpO2 97%   BMI 31.67 kg/m²  Body mass index is 31.67 kg/m².    Physical Exam  Constitutional:       Appearance: Normal appearance.   Cardiovascular:      Rate and Rhythm: Normal rate and regular rhythm.      Heart sounds: No murmur heard.  Pulmonary:      Effort: Pulmonary effort is normal.      Breath sounds: Normal breath sounds. No wheezing. "   Musculoskeletal:      Cervical back: Normal range of motion and neck supple.   Lymphadenopathy:      Cervical: No cervical adenopathy.   Neurological:      Mental Status: She is alert.           Labs:  recent cmp showed ifg, Blood count is fine , tsh wnl     Assessment & Plan:     89 y.o. female with the following -     1. Upper respiratory tract infection, unspecified type  Acute stabel  Continue conservative management    2. Prediabetes  Chronic stable   Denies ss hyperglycemia    3. Primary hypertension  Chronic stable   Well controlled on hctz 25mg daily, losartan 50mg daily taking wo any issue     4. Insomnia, unspecified type  Chronic stable   Denies a/e   - zolpidem (AMBIEN) 5 MG Tab; Take 1 Tablet by mouth at bedtime as needed for Sleep for up to 180 days. Dispense 90 tablet for 90 day supply with one refill for total of 180 day supply  Dispense: 90 Tablet; Refill: 1  Obtained and reviewed patient utilization report from [facility] pharmacy database on 9/13/2024 1:51 PM  prior to writing prescription for controlled substance II, III or IV per [place] bill . Based on assessment of the report, the prescription is medically necessary.            Return in about 6 months (around 3/13/2025) for Lab review, Med check.    Please note that this dictation was created using voice recognition software. I have made every reasonable attempt to correct obvious errors, but I expect that there are errors of grammar and possibly content that I did not discover before finalizing the note.

## 2024-09-19 ENCOUNTER — PATIENT OUTREACH (OUTPATIENT)
Dept: HEALTH INFORMATION MANAGEMENT | Facility: OTHER | Age: 89
End: 2024-09-19
Payer: MEDICARE

## 2024-09-19 DIAGNOSIS — J40 BRONCHITIS: ICD-10-CM

## 2024-09-19 DIAGNOSIS — N18.31 CHRONIC KIDNEY DISEASE, STAGE 3A: ICD-10-CM

## 2024-09-19 DIAGNOSIS — I10 PRIMARY HYPERTENSION: ICD-10-CM

## 2024-09-19 DIAGNOSIS — I70.0 ATHEROSCLEROSIS OF AORTA (HCC): ICD-10-CM

## 2024-09-19 DIAGNOSIS — M79.605 BILATERAL LEG PAIN: ICD-10-CM

## 2024-09-19 DIAGNOSIS — M79.604 BILATERAL LEG PAIN: ICD-10-CM

## 2024-09-19 DIAGNOSIS — N18.2 STAGE 2 CHRONIC KIDNEY DISEASE: ICD-10-CM

## 2024-09-19 DIAGNOSIS — I10 HYPERTENSION, UNSPECIFIED TYPE: ICD-10-CM

## 2024-09-19 RX ORDER — METHYLPREDNISOLONE 4 MG
TABLET, DOSE PACK ORAL
Qty: 21 TABLET | Refills: 0 | Status: SHIPPED | OUTPATIENT
Start: 2024-09-19

## 2024-09-19 NOTE — PROGRESS NOTES
Assessment  Iveth left a message reporting she does has a continued cough. She was requesting a medrol dose pack. Message forwarded to her PCP. Returned Iveth's call and completed monthly outreach follow up. Iveth discussed her last PCP appointment and continued cough. She reports her cough is occasionally productive with cream colored phlegm. She denies any shortness of breath or difficulty breathing. Reviewed with Iveth a message has been sent to her PCP, waiting a return. Discussed ER precautions if she become short of breath. 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 She denies any other needs at this time. Encouraged to call with any questions or concerns.     Education and Self Management of Care  Discussed her chronic cough      Plan of Care and Goals  Continue to remain active      Barriers:  Age  Progression of disease process   Knowledge of healthcare  Habits       Progress:  Progressing     Next outreach:  1 month

## 2024-09-19 NOTE — PROGRESS NOTES
Called Iveth and notified her that Dr. Waterman sent in a medrol dose pack. Iveth verbalized understanding.

## 2024-10-16 ENCOUNTER — PATIENT OUTREACH (OUTPATIENT)
Dept: HEALTH INFORMATION MANAGEMENT | Facility: OTHER | Age: 89
End: 2024-10-16
Payer: MEDICARE

## 2024-10-16 DIAGNOSIS — M79.604 BILATERAL LEG PAIN: ICD-10-CM

## 2024-10-16 DIAGNOSIS — I10 HYPERTENSION, UNSPECIFIED TYPE: ICD-10-CM

## 2024-10-16 DIAGNOSIS — N18.31 CHRONIC KIDNEY DISEASE, STAGE 3A: ICD-10-CM

## 2024-10-16 DIAGNOSIS — I10 PRIMARY HYPERTENSION: ICD-10-CM

## 2024-10-16 DIAGNOSIS — I70.0 ATHEROSCLEROSIS OF AORTA (HCC): ICD-10-CM

## 2024-10-16 DIAGNOSIS — M79.605 BILATERAL LEG PAIN: ICD-10-CM

## 2024-10-16 DIAGNOSIS — N18.2 STAGE 2 CHRONIC KIDNEY DISEASE: ICD-10-CM

## 2024-10-17 ENCOUNTER — OFFICE VISIT (OUTPATIENT)
Dept: RHEUMATOLOGY | Facility: MEDICAL CENTER | Age: 89
End: 2024-10-17
Attending: STUDENT IN AN ORGANIZED HEALTH CARE EDUCATION/TRAINING PROGRAM
Payer: MEDICARE

## 2024-10-17 VITALS
HEART RATE: 83 BPM | BODY MASS INDEX: 33.31 KG/M2 | DIASTOLIC BLOOD PRESSURE: 76 MMHG | OXYGEN SATURATION: 93 % | HEIGHT: 62 IN | WEIGHT: 181 LBS | SYSTOLIC BLOOD PRESSURE: 110 MMHG | TEMPERATURE: 96.9 F

## 2024-10-17 DIAGNOSIS — M15.9 OSTEOARTHRITIS INVOLVING MULTIPLE JOINTS ON BOTH SIDES OF BODY: ICD-10-CM

## 2024-10-17 DIAGNOSIS — M81.0 AGE-RELATED OSTEOPOROSIS WITHOUT CURRENT PATHOLOGICAL FRACTURE: ICD-10-CM

## 2024-10-17 DIAGNOSIS — M47.819 MULTILEVEL DEGENERATIVE JOINT DISEASE OF SPINE: ICD-10-CM

## 2024-10-17 DIAGNOSIS — M35.3 POLYMYALGIA RHEUMATICA (HCC): ICD-10-CM

## 2024-10-17 PROCEDURE — 99212 OFFICE O/P EST SF 10 MIN: CPT | Performed by: STUDENT IN AN ORGANIZED HEALTH CARE EDUCATION/TRAINING PROGRAM

## 2024-10-17 PROCEDURE — 3078F DIAST BP <80 MM HG: CPT | Performed by: STUDENT IN AN ORGANIZED HEALTH CARE EDUCATION/TRAINING PROGRAM

## 2024-10-17 PROCEDURE — 99214 OFFICE O/P EST MOD 30 MIN: CPT | Performed by: STUDENT IN AN ORGANIZED HEALTH CARE EDUCATION/TRAINING PROGRAM

## 2024-10-17 PROCEDURE — 3074F SYST BP LT 130 MM HG: CPT | Performed by: STUDENT IN AN ORGANIZED HEALTH CARE EDUCATION/TRAINING PROGRAM

## 2024-10-17 ASSESSMENT — FIBROSIS 4 INDEX: FIB4 SCORE: 1.56

## 2024-10-24 DIAGNOSIS — J44.9 CHRONIC OBSTRUCTIVE PULMONARY DISEASE, UNSPECIFIED COPD TYPE (HCC): ICD-10-CM

## 2024-10-24 RX ORDER — FLUTICASONE FUROATE 100 UG/1
1 POWDER RESPIRATORY (INHALATION)
Qty: 30 EACH | Refills: 6 | Status: SHIPPED | OUTPATIENT
Start: 2024-10-24

## 2024-10-31 ENCOUNTER — APPOINTMENT (OUTPATIENT)
Dept: MEDICAL GROUP | Facility: MEDICAL CENTER | Age: 89
End: 2024-10-31
Payer: MEDICARE

## 2024-11-13 ENCOUNTER — PATIENT OUTREACH (OUTPATIENT)
Dept: HEALTH INFORMATION MANAGEMENT | Facility: OTHER | Age: 89
End: 2024-11-13
Payer: MEDICARE

## 2024-11-13 DIAGNOSIS — M79.605 BILATERAL LEG PAIN: ICD-10-CM

## 2024-11-13 DIAGNOSIS — N18.31 CHRONIC KIDNEY DISEASE, STAGE 3A: ICD-10-CM

## 2024-11-13 DIAGNOSIS — I10 HYPERTENSION, UNSPECIFIED TYPE: ICD-10-CM

## 2024-11-13 DIAGNOSIS — N18.2 STAGE 2 CHRONIC KIDNEY DISEASE: ICD-10-CM

## 2024-11-13 DIAGNOSIS — M79.604 BILATERAL LEG PAIN: ICD-10-CM

## 2024-11-13 DIAGNOSIS — I10 PRIMARY HYPERTENSION: ICD-10-CM

## 2024-11-13 DIAGNOSIS — I70.0 ATHEROSCLEROSIS OF AORTA (HCC): ICD-10-CM

## 2024-11-15 NOTE — PROGRESS NOTES
Assessment  Spoke with Iveth for monthly outreach follow-up.  Iveth states she is doing very well.  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  Iveth discussed how much she enjoys playing but going out and driving at night is becoming increasingly difficult for her.  She states that next year next year she may not be as involved.  Reviewed follow-up with pain management.  Iveth has been on service with the personal care management program for her approximately 2 years.  Re-reviewed the purpose of the personal care management program and services.  Iveth reports she would like to stay on service with monthly phone calls. Discussed developing new healthcare goals.  Updated her plan of care include hypertension and diabetes education.  Discussed the possibility of physical therapy after the new year.  Added this is a personal healthcare goal as well.  She denies any other needs at this time. Encouraged to call with any questions or concerns.     Education and Self-Management of Care  Reviewed the purpose of Personal Care Management Program and services  Discussed healthcare goals and updated plan of care    Plan of Care and Goals  Updated plan of care format  Added Diabetes education  Added hypertension education  Continue to increase exercise    Barriers:  Age  Progression of disease process   Knowledge of healthcare  Chronic arthritis pain   Habits     Progress:  Progressing     Next outreach:  December 2024

## 2024-12-13 ENCOUNTER — PATIENT OUTREACH (OUTPATIENT)
Dept: HEALTH INFORMATION MANAGEMENT | Facility: OTHER | Age: 89
End: 2024-12-13
Payer: MEDICARE

## 2024-12-13 DIAGNOSIS — I10 PRIMARY HYPERTENSION: ICD-10-CM

## 2024-12-13 DIAGNOSIS — M79.604 BILATERAL LEG PAIN: ICD-10-CM

## 2024-12-13 DIAGNOSIS — I10 HYPERTENSION, UNSPECIFIED TYPE: ICD-10-CM

## 2024-12-13 DIAGNOSIS — N18.2 STAGE 2 CHRONIC KIDNEY DISEASE: ICD-10-CM

## 2024-12-13 DIAGNOSIS — M79.605 BILATERAL LEG PAIN: ICD-10-CM

## 2024-12-13 DIAGNOSIS — I70.0 ATHEROSCLEROSIS OF AORTA (HCC): ICD-10-CM

## 2024-12-13 DIAGNOSIS — N18.31 CHRONIC KIDNEY DISEASE, STAGE 3A: ICD-10-CM

## 2024-12-13 NOTE — PROGRESS NOTES
Assessment  Spoke with Iveth for Monthly outreach follow up. Iveth reports she has been suffering from nausea, vomiting, and diarrhea for approximately 24hrs. She reports improvement now. She has taken sips of water and saltine crackers. Reviewed risks of dehydration and encouraged fluids. Reviewed BRAT diet. Instructed Iveth to call 911 if she does not continue to improve. Reviewed her blood pressure medications, HCTZ and losartan. Refill request sent for both medications. Iveth denies any other needs at this time. Encouraged to call with any questions or concerns.     Education and Self-Management of Care  Discussed risks of dehydration with N/V/D  Discussed BRAT diet   Discussed next PCP appointment     Plan of Care and Goals  Continue diabetes education  Continue hypertension education  Continue to increase exercise     Barriers:  Age  Progression of disease process   Knowledge of healthcare  Chronic arthritis pain   Habits      Progress:  Progressing     Next outreach:   January 2025

## 2024-12-13 NOTE — CARE PLAN
Problem: Adherence to prescribed medications  Goal: Improve medication adherence/short term   Outcome: Met  Intervention: Educate patient about the importance of blood pressure medications  Intervention: In conjunction with patient, determine why adherence is a challenge  Intervention: Develop a plan with patient to improve adherence

## 2024-12-13 NOTE — TELEPHONE ENCOUNTER
Received request via: Patient    Was the patient seen in the last year in this department? Yes    Does the patient have an active prescription (recently filled or refills available) for medication(s) requested? No    Pharmacy Name: [facility]     Does the patient have group home Plus and need 100-day supply? (This applies to ALL medications) Yes, quantity updated to 100 days

## 2024-12-16 RX ORDER — LOSARTAN POTASSIUM 50 MG/1
50 TABLET ORAL
Qty: 100 TABLET | Refills: 3 | Status: SHIPPED | OUTPATIENT
Start: 2024-12-16

## 2024-12-16 RX ORDER — HYDROCHLOROTHIAZIDE 25 MG/1
25 TABLET ORAL EVERY MORNING
Qty: 100 TABLET | Refills: 3 | Status: SHIPPED | OUTPATIENT
Start: 2024-12-16

## 2025-01-03 DIAGNOSIS — M70.61 TROCHANTERIC BURSITIS OF BOTH HIPS: ICD-10-CM

## 2025-01-03 DIAGNOSIS — M70.62 TROCHANTERIC BURSITIS OF BOTH HIPS: ICD-10-CM

## 2025-01-03 DIAGNOSIS — M35.3 POLYMYALGIA RHEUMATICA (HCC): ICD-10-CM

## 2025-01-03 DIAGNOSIS — M75.32 CALCIFIC TENDINITIS OF BOTH SHOULDERS: ICD-10-CM

## 2025-01-03 DIAGNOSIS — M75.31 CALCIFIC TENDINITIS OF BOTH SHOULDERS: ICD-10-CM

## 2025-01-07 RX ORDER — PREDNISONE 1 MG/1
TABLET ORAL
Qty: 180 TABLET | Refills: 3 | Status: SHIPPED | OUTPATIENT
Start: 2025-01-07

## 2025-01-14 ENCOUNTER — PATIENT OUTREACH (OUTPATIENT)
Dept: HEALTH INFORMATION MANAGEMENT | Facility: OTHER | Age: OVER 89
End: 2025-01-14
Payer: MEDICARE

## 2025-01-14 DIAGNOSIS — N18.31 CHRONIC KIDNEY DISEASE, STAGE 3A: ICD-10-CM

## 2025-01-14 DIAGNOSIS — M79.604 BILATERAL LEG PAIN: ICD-10-CM

## 2025-01-14 DIAGNOSIS — I10 PRIMARY HYPERTENSION: ICD-10-CM

## 2025-01-14 DIAGNOSIS — M79.605 BILATERAL LEG PAIN: ICD-10-CM

## 2025-01-14 DIAGNOSIS — N18.2 STAGE 2 CHRONIC KIDNEY DISEASE: ICD-10-CM

## 2025-01-14 DIAGNOSIS — I70.0 ATHEROSCLEROSIS OF AORTA (HCC): ICD-10-CM

## 2025-01-14 DIAGNOSIS — I10 HYPERTENSION, UNSPECIFIED TYPE: ICD-10-CM

## 2025-01-15 NOTE — PROGRESS NOTES
Reason for Follow-Up:  Monthly outreach follow up     Current Health Status:  Iveth states she is fine other than her ears.     Medical Updates:   No hospitalizations or recent changes     Medication Updates:    No medication changes     Symptoms:    Iveth is complaining of her ears feeling plugged and some hearing loss     Plan of Care Goals and Progress:    Goal 1. Hypertension education      Progress:  Iveth's main concern is getting her ears evaluated, will follow up on hypertension education at next monthly outreach follow up.       Barriers:  Age, progression of disease process , knowledge of healthcare, habits      Interventions:  Encouraged to call with any questions or concerns     Education:  Discussed continued follow up at next monthly outreach     Goal 2. Diabetes education      Progress:  Iveth's main concern is getting her ears evaluated, will follow up on hypertension education at next monthly outreach follow up.      Barriers:  Age, progression of disease process , knowledge of healthcare, habits     Interventions: Encouraged to call with any questions or concerns     Education:  Discussed continued follow up at next monthly outreach       Patient's Concerns and Feedback (Self Management of Care):   Called and left a message asking for return call.  Returned Iveth's call.  She reports her ears are clogged and thinks it may be affecting her hearing.  She is asking for an appointment to have her ears cleaned.  The next available PCP appointment in clinic is February 25.  Iveth did not feel as though she wanted to wait that long discussed that she could make an appointment at her urgent care.  Provided [facility] scheduling [phone].  Iveth states she will call for an appointment.  She states if her hearing does not improve after her ears are cleaned she will call for a referral to an audiologist.  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  Iveth denied any other needs at this time.  She states that her ears are her main concern and did not want to discuss her other healthcare goals at this call.  Will follow-up at next month out reach.  Encouraged to call with any questions or concerns.     Chart reviewed for Quarterly Assessment, patient continues to qualify and would benefit from PCM program.     Next Steps:     Follow-Up Plan:  February 2025      Appointments:  Encouraged to follow up with urgent care, next PCP 3/31/25     Contact Information:  Call [phone] with any questions or concerns.

## 2025-01-21 ENCOUNTER — HOSPITAL ENCOUNTER (OUTPATIENT)
Dept: LAB | Facility: MEDICAL CENTER | Age: OVER 89
End: 2025-01-21
Attending: INTERNAL MEDICINE
Payer: MEDICARE

## 2025-01-21 LAB
ALBUMIN SERPL BCP-MCNC: 4.5 G/DL (ref 3.2–4.9)
ALBUMIN/GLOB SERPL: 2.1 G/DL
ALP SERPL-CCNC: 74 U/L (ref 30–99)
ALT SERPL-CCNC: 22 U/L (ref 2–50)
ANION GAP SERPL CALC-SCNC: 12 MMOL/L (ref 7–16)
AST SERPL-CCNC: 20 U/L (ref 12–45)
BASOPHILS # BLD AUTO: 0.3 % (ref 0–1.8)
BASOPHILS # BLD: 0.05 K/UL (ref 0–0.12)
BILIRUB SERPL-MCNC: 0.5 MG/DL (ref 0.1–1.5)
BUN SERPL-MCNC: 23 MG/DL (ref 8–22)
CALCIUM ALBUM COR SERPL-MCNC: 9.1 MG/DL (ref 8.5–10.5)
CALCIUM SERPL-MCNC: 9.5 MG/DL (ref 8.5–10.5)
CHLORIDE SERPL-SCNC: 96 MMOL/L (ref 96–112)
CO2 SERPL-SCNC: 28 MMOL/L (ref 20–33)
CREAT SERPL-MCNC: 1.16 MG/DL (ref 0.5–1.4)
EOSINOPHIL # BLD AUTO: 0.1 K/UL (ref 0–0.51)
EOSINOPHIL NFR BLD: 0.7 % (ref 0–6.9)
ERYTHROCYTE [DISTWIDTH] IN BLOOD BY AUTOMATED COUNT: 47.9 FL (ref 35.9–50)
FERRITIN SERPL-MCNC: 85.1 NG/ML (ref 10–291)
GFR SERPLBLD CREATININE-BSD FMLA CKD-EPI: 44 ML/MIN/1.73 M 2
GLOBULIN SER CALC-MCNC: 2.1 G/DL (ref 1.9–3.5)
GLUCOSE SERPL-MCNC: 94 MG/DL (ref 65–99)
HCT VFR BLD AUTO: 47.7 % (ref 37–47)
HGB BLD-MCNC: 15.3 G/DL (ref 12–16)
IMM GRANULOCYTES # BLD AUTO: 0.12 K/UL (ref 0–0.11)
IMM GRANULOCYTES NFR BLD AUTO: 0.8 % (ref 0–0.9)
IRON SATN MFR SERPL: 31 % (ref 15–55)
IRON SERPL-MCNC: 110 UG/DL (ref 40–170)
LYMPHOCYTES # BLD AUTO: 3.31 K/UL (ref 1–4.8)
LYMPHOCYTES NFR BLD: 22.4 % (ref 22–41)
MAGNESIUM SERPL-MCNC: 2 MG/DL (ref 1.5–2.5)
MCH RBC QN AUTO: 29.9 PG (ref 27–33)
MCHC RBC AUTO-ENTMCNC: 32.1 G/DL (ref 32.2–35.5)
MCV RBC AUTO: 93.2 FL (ref 81.4–97.8)
MONOCYTES # BLD AUTO: 1.62 K/UL (ref 0–0.85)
MONOCYTES NFR BLD AUTO: 10.9 % (ref 0–13.4)
NEUTROPHILS # BLD AUTO: 9.6 K/UL (ref 1.82–7.42)
NEUTROPHILS NFR BLD: 64.9 % (ref 44–72)
NRBC # BLD AUTO: 0 K/UL
NRBC BLD-RTO: 0 /100 WBC (ref 0–0.2)
PLATELET # BLD AUTO: 393 K/UL (ref 164–446)
PMV BLD AUTO: 10.9 FL (ref 9–12.9)
POTASSIUM SERPL-SCNC: 3.6 MMOL/L (ref 3.6–5.5)
PROT SERPL-MCNC: 6.6 G/DL (ref 6–8.2)
RBC # BLD AUTO: 5.12 M/UL (ref 4.2–5.4)
SODIUM SERPL-SCNC: 136 MMOL/L (ref 135–145)
TIBC SERPL-MCNC: 356 UG/DL (ref 250–450)
UIBC SERPL-MCNC: 246 UG/DL (ref 110–370)
VIT B12 SERPL-MCNC: 492 PG/ML (ref 211–911)
WBC # BLD AUTO: 14.8 K/UL (ref 4.8–10.8)

## 2025-01-21 PROCEDURE — 82607 VITAMIN B-12: CPT

## 2025-01-21 PROCEDURE — 82728 ASSAY OF FERRITIN: CPT

## 2025-01-21 PROCEDURE — 85025 COMPLETE CBC W/AUTO DIFF WBC: CPT

## 2025-01-21 PROCEDURE — 83735 ASSAY OF MAGNESIUM: CPT

## 2025-01-21 PROCEDURE — 36415 COLL VENOUS BLD VENIPUNCTURE: CPT

## 2025-01-21 PROCEDURE — 83550 IRON BINDING TEST: CPT

## 2025-01-21 PROCEDURE — 80053 COMPREHEN METABOLIC PANEL: CPT

## 2025-01-21 PROCEDURE — 83540 ASSAY OF IRON: CPT

## 2025-02-18 ENCOUNTER — PATIENT OUTREACH (OUTPATIENT)
Dept: HEALTH INFORMATION MANAGEMENT | Facility: OTHER | Age: OVER 89
End: 2025-02-18
Payer: MEDICARE

## 2025-02-18 DIAGNOSIS — I70.0 ATHEROSCLEROSIS OF AORTA (HCC): ICD-10-CM

## 2025-02-18 DIAGNOSIS — N18.2 STAGE 2 CHRONIC KIDNEY DISEASE: ICD-10-CM

## 2025-02-18 DIAGNOSIS — M35.3 POLYMYALGIA RHEUMATICA (HCC): ICD-10-CM

## 2025-02-18 DIAGNOSIS — I10 PRIMARY HYPERTENSION: ICD-10-CM

## 2025-02-19 NOTE — CARE PLAN
Problem: Knowledge deficit of factors contributing to hypertension  Goal: Demonstrate factors that can contribute to high blood pressure/long term   Outcome: Progressing  Note: Patient has verbalized understanding of factors contributing to high blood pressure   Intervention: Educate patient on role of physical activity  Description: Refer to community wellness programs

## 2025-02-19 NOTE — PROGRESS NOTES
Reason for Follow-Up:  Monthly outreach follow up    Current Health Status:  DM2, HTN, CKD 2, atherosclerosis of aorta     Medical Updates:    No recent ER visits or hospitalization     Medication Updates:    No medication changes     Symptoms:    Denies any current complaints or new symptoms     Plan of Care Goals and Progress:    Goal 1. Hypertension education      Progress:  Progressing, /76 at Rheumatology follow up on 10/7/24.       Barriers:  Age, progression of disease process, knowledge of healthcare, habits      Interventions:  Reviewed next PCP appointment      Education:  Discussed increasing exercise, Iveth is interested in physical therapy, encouraged to discussed with PCP at next appointment     Goal 2. Diabetic education      Progress:  Progressing, A1c on 1/10/24      Barriers:  Age, progression of disease process, knowledge of healthcare, habits      Interventions:  Reviewed next PCP appointment      Education:        Patient's Concerns and Feedback (Self Management of Care):   Iveth returned call for monthly outreach follow up. Iveth reports she is doing well. She denies any complaints or concerns. Discussed her goal of increasing exercise. She is interest in physical therapy to improve her overall stamina. Discussed overall health benefits of exercise. Reviewed her next PCP appointment. Encouraged Iveth to discussed her interest in physical therapy with her PCP.  Discussed her dermatology referral and current skin conditions. She does not routinely check her blood pressure at home. At her last clinic visit with Rheumatology her BP was 110/76. She continues to follow up with pain management and denies any concerns. Iveth denies any needs at this time. Encouraged to call with any questions or concerns.     Next Steps:     Follow-Up Plan:  March 2025      Appointments:  3/13 PCP      Contact Information:  Call [phone] with any questions or concerns.

## 2025-03-13 ENCOUNTER — OFFICE VISIT (OUTPATIENT)
Dept: MEDICAL GROUP | Facility: MEDICAL CENTER | Age: OVER 89
End: 2025-03-13
Payer: MEDICARE

## 2025-03-13 VITALS
DIASTOLIC BLOOD PRESSURE: 70 MMHG | WEIGHT: 180.2 LBS | BODY MASS INDEX: 33.16 KG/M2 | HEART RATE: 82 BPM | HEIGHT: 62 IN | TEMPERATURE: 96.5 F | RESPIRATION RATE: 18 BRPM | OXYGEN SATURATION: 93 % | SYSTOLIC BLOOD PRESSURE: 116 MMHG

## 2025-03-13 DIAGNOSIS — M25.552 BILATERAL HIP PAIN: ICD-10-CM

## 2025-03-13 DIAGNOSIS — G89.29 CHRONIC BILATERAL LOW BACK PAIN WITHOUT SCIATICA: ICD-10-CM

## 2025-03-13 DIAGNOSIS — F13.20 SEDATIVE HYPNOTIC OR ANXIOLYTIC DEPENDENCE (HCC): ICD-10-CM

## 2025-03-13 DIAGNOSIS — M25.551 BILATERAL HIP PAIN: ICD-10-CM

## 2025-03-13 DIAGNOSIS — E78.00 HYPERCHOLESTEROLEMIA: ICD-10-CM

## 2025-03-13 DIAGNOSIS — I10 PRIMARY HYPERTENSION: ICD-10-CM

## 2025-03-13 DIAGNOSIS — G47.00 INSOMNIA, UNSPECIFIED TYPE: ICD-10-CM

## 2025-03-13 DIAGNOSIS — F40.298 FEAR OF FALLING: ICD-10-CM

## 2025-03-13 DIAGNOSIS — R73.03 PREDIABETES: ICD-10-CM

## 2025-03-13 DIAGNOSIS — M54.50 CHRONIC BILATERAL LOW BACK PAIN WITHOUT SCIATICA: ICD-10-CM

## 2025-03-13 DIAGNOSIS — H61.22 IMPACTED CERUMEN OF LEFT EAR: ICD-10-CM

## 2025-03-13 PROCEDURE — 99214 OFFICE O/P EST MOD 30 MIN: CPT | Performed by: STUDENT IN AN ORGANIZED HEALTH CARE EDUCATION/TRAINING PROGRAM

## 2025-03-13 PROCEDURE — 3074F SYST BP LT 130 MM HG: CPT | Performed by: STUDENT IN AN ORGANIZED HEALTH CARE EDUCATION/TRAINING PROGRAM

## 2025-03-13 PROCEDURE — 3078F DIAST BP <80 MM HG: CPT | Performed by: STUDENT IN AN ORGANIZED HEALTH CARE EDUCATION/TRAINING PROGRAM

## 2025-03-13 RX ORDER — ZOLPIDEM TARTRATE 5 MG/1
5 TABLET ORAL NIGHTLY PRN
Qty: 90 TABLET | Refills: 1 | Status: SHIPPED | OUTPATIENT
Start: 2025-04-13 | End: 2025-10-10

## 2025-03-13 ASSESSMENT — ENCOUNTER SYMPTOMS
VOMITING: 0
WEIGHT LOSS: 0
DIZZINESS: 0
HEADACHES: 0
NAUSEA: 0
CHILLS: 0
FEVER: 0
WHEEZING: 0
PALPITATIONS: 0
SHORTNESS OF BREATH: 0

## 2025-03-13 ASSESSMENT — PATIENT HEALTH QUESTIONNAIRE - PHQ9: CLINICAL INTERPRETATION OF PHQ2 SCORE: 0

## 2025-03-13 ASSESSMENT — FIBROSIS 4 INDEX: FIB4 SCORE: 0.99

## 2025-03-13 NOTE — PROGRESS NOTES
"Subjective:     CC:     HPI:   Iveth presents today with    PMH HTN, HLD, IFG, PMR/arthralgia on 1md prednisone daily, chronic back pain( gabapentin 200AM, 300mg PM), primary insomnia ( on zolpidem since 1997), GERD/hx esophageal ulcer/ hiatal hernia require omeprazole 40mg BID, hx of skin lesion ( scc, bcc)   - Patient is a pianist  - on zolpidem  - csa  Specialist  Rheumatology- Mojgan IRWIN  Dermatology   Pain management- Marisol IRWIN    Overall patient is doing fine.  Presents for zolpidem refill.  She has been on this medication since 1997 without issues.  CSA was signed today.  She also request a physical therapy referral for lower back pain and hip pain.  She has not had any falls but takes precautions to prevent a fall.    Advance directive paperwork given to patient.  POLST was filled today.  Patient will have a discussion with her son regarding both paperwork      Health Maintenance:   ROS:  Review of Systems   Constitutional:  Negative for chills, fever and weight loss.   HENT:  Negative for hearing loss.    Respiratory:  Negative for shortness of breath and wheezing.    Cardiovascular:  Negative for chest pain and palpitations.   Gastrointestinal:  Negative for nausea and vomiting.   Genitourinary:  Negative for frequency and urgency.   Skin:  Negative for rash.   Neurological:  Negative for dizziness and headaches.       Objective:     Exam:  /70 (BP Location: Left arm, Patient Position: Sitting, BP Cuff Size: Adult)   Pulse 82   Temp 35.8 °C (96.5 °F)   Resp 18   Ht 1.575 m (5' 2\")   Wt 81.7 kg (180 lb 3.2 oz)   SpO2 93%   BMI 32.96 kg/m²  Body mass index is 32.96 kg/m².    Physical Exam  Constitutional:       Appearance: Normal appearance.   Cardiovascular:      Rate and Rhythm: Normal rate and regular rhythm.      Heart sounds: No murmur heard.  Pulmonary:      Effort: Pulmonary effort is normal.      Breath sounds: Normal breath sounds. No wheezing.   Musculoskeletal:      Cervical back: " Normal range of motion and neck supple.   Lymphadenopathy:      Cervical: No cervical adenopathy.   Neurological:      Mental Status: She is alert.         Labs:     Assessment & Plan:     89 y.o. 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 Based on assessment of the report, the prescription is medically necessary.     - Controlled Substance Treatment Agreement    2. Prediabetes  Chronic, stable will repeat blood work  - MICROALBUMIN CREAT RATIO URINE; Future  - Basic Metabolic Panel; Future    3. Hypercholesterolemia  Chronic, stable on lovastatin 40 mg daily taking without any issues    4. Primary hypertension  Blood pressure well-controlled today 116/70    5. Insomnia, unspecified type  Chronic, stable been on Ambien 5 mg since 1997 without any issues  - zolpidem (AMBIEN) 5 MG Tab; Take 1 Tablet by mouth at bedtime as needed for Sleep for up to 180 days. Dispense 90 tablet for 90 day supply with one refill for total of 180 day supply  Dispense: 90 Tablet; Refill: 1  - Controlled Substance Treatment Agreement    6. Chronic bilateral low back pain without sciatica  7. Fear of falling  8. Bilateral hip pain  She reports she has chronic lower back pain and bilateral hip pain would like referral to physical therapy.  She has not had any falls however does take extra precautions around the fall  - Referral to Physical Therapy    9. 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 Continue with current defined treatment plan: on zolpidem since 1997 taking wo issue. . Follow-up at least annually.  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 Continue with current defined treatment plan: Mojgan IRWIN . Follow-up at least annually.  Last edited 03/13/25 10:48 PDT by Ryan Waterman M.D.             No follow-ups on file.    Please note that this dictation was created using voice recognition software. I have made every reasonable attempt to correct obvious errors, but I expect that there are errors of grammar and possibly content that I did not discover before finalizing the note.

## 2025-03-17 ENCOUNTER — HOSPITAL ENCOUNTER (OUTPATIENT)
Dept: LAB | Facility: MEDICAL CENTER | Age: OVER 89
End: 2025-03-17
Attending: STUDENT IN AN ORGANIZED HEALTH CARE EDUCATION/TRAINING PROGRAM
Payer: MEDICARE

## 2025-03-17 ENCOUNTER — RESULTS FOLLOW-UP (OUTPATIENT)
Dept: MEDICAL GROUP | Facility: MEDICAL CENTER | Age: OVER 89
End: 2025-03-17

## 2025-03-17 DIAGNOSIS — R73.03 PREDIABETES: ICD-10-CM

## 2025-03-17 LAB
ANION GAP SERPL CALC-SCNC: 13 MMOL/L (ref 7–16)
BUN SERPL-MCNC: 15 MG/DL (ref 8–22)
CALCIUM SERPL-MCNC: 9.7 MG/DL (ref 8.5–10.5)
CHLORIDE SERPL-SCNC: 101 MMOL/L (ref 96–112)
CO2 SERPL-SCNC: 26 MMOL/L (ref 20–33)
CREAT SERPL-MCNC: 1.01 MG/DL (ref 0.5–1.4)
CREAT UR-MCNC: 55.6 MG/DL
FASTING STATUS PATIENT QL REPORTED: NORMAL
GFR SERPLBLD CREATININE-BSD FMLA CKD-EPI: 52 ML/MIN/1.73 M 2
GLUCOSE SERPL-MCNC: 104 MG/DL (ref 65–99)
MICROALBUMIN UR-MCNC: <1.2 MG/DL
MICROALBUMIN/CREAT UR: NORMAL MG/G (ref 0–30)
POTASSIUM SERPL-SCNC: 3.5 MMOL/L (ref 3.6–5.5)
SODIUM SERPL-SCNC: 140 MMOL/L (ref 135–145)

## 2025-03-17 PROCEDURE — 80048 BASIC METABOLIC PNL TOTAL CA: CPT

## 2025-03-17 PROCEDURE — 82043 UR ALBUMIN QUANTITATIVE: CPT

## 2025-03-17 PROCEDURE — 82570 ASSAY OF URINE CREATININE: CPT

## 2025-03-17 PROCEDURE — 36415 COLL VENOUS BLD VENIPUNCTURE: CPT

## 2025-03-18 ENCOUNTER — TELEPHONE (OUTPATIENT)
Dept: HEALTH INFORMATION MANAGEMENT | Facility: OTHER | Age: OVER 89
End: 2025-03-18

## 2025-03-18 ENCOUNTER — OFFICE VISIT (OUTPATIENT)
Dept: MEDICAL GROUP | Facility: MEDICAL CENTER | Age: OVER 89
End: 2025-03-18
Payer: MEDICARE

## 2025-03-18 VITALS
HEIGHT: 62 IN | HEART RATE: 89 BPM | OXYGEN SATURATION: 93 % | WEIGHT: 180 LBS | TEMPERATURE: 97 F | SYSTOLIC BLOOD PRESSURE: 110 MMHG | BODY MASS INDEX: 33.13 KG/M2 | DIASTOLIC BLOOD PRESSURE: 60 MMHG

## 2025-03-18 DIAGNOSIS — H61.20 IMPACTED CERUMEN, UNSPECIFIED LATERALITY: ICD-10-CM

## 2025-03-18 DIAGNOSIS — H91.90 CHANGE IN HEARING, UNSPECIFIED LATERALITY: ICD-10-CM

## 2025-03-18 PROCEDURE — 3078F DIAST BP <80 MM HG: CPT | Performed by: STUDENT IN AN ORGANIZED HEALTH CARE EDUCATION/TRAINING PROGRAM

## 2025-03-18 PROCEDURE — 3074F SYST BP LT 130 MM HG: CPT | Performed by: STUDENT IN AN ORGANIZED HEALTH CARE EDUCATION/TRAINING PROGRAM

## 2025-03-18 PROCEDURE — 69210 REMOVE IMPACTED EAR WAX UNI: CPT | Performed by: STUDENT IN AN ORGANIZED HEALTH CARE EDUCATION/TRAINING PROGRAM

## 2025-03-18 ASSESSMENT — FIBROSIS 4 INDEX: FIB4 SCORE: 0.99

## 2025-03-18 NOTE — PROGRESS NOTES
"Subjective:     CC:     HPI:   Iveth presents today with    Patient presents for cerumen impaction.  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  On examination bilateral ear is impacted.  Left canal appears smaller on the left side.  She underwent irrigation.  A lighted cerumen loop was used to extract cerumen from the right ear allowing visualization of the tympanic membrane.  Left ear cerumen extraction with cerumen loop tool was unsuccessful.  Patient tolerated procedure well and denies any pain or issues.    Health Maintenance:     ROS:  ROS at baseline    Objective:     Exam:  /60 (BP Location: Right arm, Patient Position: Sitting, BP Cuff Size: Adult)   Pulse 89   Temp 36.1 °C (97 °F) (Temporal)   Ht 1.575 m (5' 2\")   Wt 81.6 kg (180 lb)   SpO2 93%   BMI 32.92 kg/m²  Body mass index is 32.92 kg/m².    Physical Exam  HENT:      Right Ear: Hearing, tympanic membrane, ear canal and external ear normal. There is impacted cerumen.      Left Ear: There is impacted cerumen.         Labs:     Assessment & Plan:     89 y.o. female with the following -       1. Change in hearing, unspecified laterality  2. Impacted cerumen, unspecified laterality  Chronic, stable  History of cerumen impaction, patient is a pianist.  Report recently noted some hearing changes would like to see audiologist and unfortunately she has impacted cerumen.  She underwent irrigation and 12 was used to extract the impaction.  Right ear was successful able to visualize tympanic membrane without evidence of inflammation.  Left ear service extraction was unsuccessful will refer patient to ENT  Recommend OTC Debrox and to keep ears clean dry  - Ear Irrigation ([place] Only); Future  - Referral to ENT      Return in about 4 months (around 7/18/2025).    Please note that this dictation was created using voice recognition software. I have made every reasonable attempt to correct obvious errors, but I expect that there " are errors of grammar and possibly content that I did not discover before finalizing the note.

## 2025-03-21 ENCOUNTER — PATIENT OUTREACH (OUTPATIENT)
Dept: HEALTH INFORMATION MANAGEMENT | Facility: OTHER | Age: OVER 89
End: 2025-03-21
Payer: MEDICARE

## 2025-03-21 DIAGNOSIS — M79.605 BILATERAL LEG PAIN: ICD-10-CM

## 2025-03-21 DIAGNOSIS — I10 HYPERTENSION, UNSPECIFIED TYPE: ICD-10-CM

## 2025-03-21 DIAGNOSIS — I10 PRIMARY HYPERTENSION: ICD-10-CM

## 2025-03-21 DIAGNOSIS — I70.0 ATHEROSCLEROSIS OF AORTA (HCC): ICD-10-CM

## 2025-03-21 DIAGNOSIS — M79.604 BILATERAL LEG PAIN: ICD-10-CM

## 2025-03-21 DIAGNOSIS — M35.3 POLYMYALGIA RHEUMATICA (HCC): ICD-10-CM

## 2025-03-21 DIAGNOSIS — N18.2 STAGE 2 CHRONIC KIDNEY DISEASE: ICD-10-CM

## 2025-03-21 DIAGNOSIS — N18.31 CHRONIC KIDNEY DISEASE, STAGE 3A: ICD-10-CM

## 2025-03-21 NOTE — PROGRESS NOTES
Reason for Follow-Up:  Monthly outreach follow up    Current Health Status:  DM2, HTN, CKD 2, atherosclerosis of aorta     Medical Updates:    No recent ER visits or hospitalization     Medication Updates:    No medication changes     Symptoms:    Denies any current complaints or new symptoms      Plan of Care Goals and Progress:    Goal 1.  Diabetic education      Progress:  A1c on 1/10/24 was 6.3      Barriers:  Age, progression of disease process, knowledge of healthcare, habits      Interventions:  Reviewed next PCP appointment      Education:  Discussed diet and exercise-physical therapy referral     Goal 2. Increase physical activity/exercise      Progress:  Physical therapy referral ordered at last PCP follow up on 3/13, referral pending       Barriers:  Age, progression of disease process, knowledge of healthcare, habits      Interventions:  Reviewed last PCP follow up on 3/13     Education:  Will continue to follow for approval       Patient's Concerns and Feedback (Self Management of Care):   Iveth returned call for monthly outreach follow-up.  She states she is doing well.  Reviewed her PCP appointments from 3/13 and 3/18.  Discussed her physical therapy and ENT referrals.  Both are pending and have not been improved.  She denies any needs at this time.  Encouraged to call with any questions or concerns.    Next Steps:     Follow-Up Plan:  April 2025      Appointments:  7/25 PCP, 10/6 Rheumatology      Contact Information:  Call [phone] with any questions or concerns.

## 2025-04-08 DIAGNOSIS — G47.00 INSOMNIA, UNSPECIFIED TYPE: ICD-10-CM

## 2025-04-10 ENCOUNTER — PATIENT OUTREACH (OUTPATIENT)
Dept: HEALTH INFORMATION MANAGEMENT | Facility: OTHER | Age: OVER 89
End: 2025-04-10
Payer: MEDICARE

## 2025-04-10 DIAGNOSIS — I10 HYPERTENSION, UNSPECIFIED TYPE: ICD-10-CM

## 2025-04-10 DIAGNOSIS — I70.0 ATHEROSCLEROSIS OF AORTA (HCC): ICD-10-CM

## 2025-04-10 DIAGNOSIS — N18.31 CHRONIC KIDNEY DISEASE, STAGE 3A: ICD-10-CM

## 2025-04-10 DIAGNOSIS — M79.604 BILATERAL LEG PAIN: ICD-10-CM

## 2025-04-10 DIAGNOSIS — G47.00 INSOMNIA, UNSPECIFIED TYPE: ICD-10-CM

## 2025-04-10 DIAGNOSIS — M79.605 BILATERAL LEG PAIN: ICD-10-CM

## 2025-04-10 DIAGNOSIS — I10 PRIMARY HYPERTENSION: ICD-10-CM

## 2025-04-10 DIAGNOSIS — N18.2 STAGE 2 CHRONIC KIDNEY DISEASE: ICD-10-CM

## 2025-04-10 DIAGNOSIS — M35.3 POLYMYALGIA RHEUMATICA (HCC): ICD-10-CM

## 2025-04-10 RX ORDER — LOVASTATIN 40 MG/1
40 TABLET ORAL EVERY EVENING
Qty: 100 TABLET | Refills: 3 | Status: SHIPPED | OUTPATIENT
Start: 2025-04-10

## 2025-04-10 RX ORDER — ZOLPIDEM TARTRATE 5 MG/1
5 TABLET ORAL NIGHTLY PRN
Qty: 90 TABLET | Refills: 1 | Status: SHIPPED | OUTPATIENT
Start: 2025-04-13 | End: 2025-10-10

## 2025-04-10 RX ORDER — ZOLPIDEM TARTRATE 5 MG/1
TABLET ORAL
Qty: 90 TABLET | OUTPATIENT
Start: 2025-04-10

## 2025-04-10 NOTE — PROGRESS NOTES
Iveth left a message asking for a return call. Spoke with Iveth, she called to discussed her Zolpidem prescription. She was having difficulty getting it filled at [facility]. Reviewed the refill process, it appears as though the pharmacy is out of stock, and will be able to provide a partial refill. Wished Iveth a Happy Birthday. She discussed plans for her birthday. She states she is feeling well and denies any needs. She asked to defer April's monthly outreach call and follow up again in May. Will complete Annual case review in May as well. Encouraged Iveth to call with any questions or concerns.

## 2025-04-10 NOTE — TELEPHONE ENCOUNTER
Received request via: Pharmacy    Was the patient seen in the last year in this department? Yes    Does the patient have an active prescription (recently filled or refills available) for medication(s) requested? No    Pharmacy Name: [facility]/pharmacy #[id number] - [place] - [address] in [place]     Does the patient have skilled nursing Plus and need 100-day supply? (This applies to ALL medications) Yes, quantity updated to 100 days

## 2025-04-11 ENCOUNTER — PATIENT MESSAGE (OUTPATIENT)
Dept: HEALTH INFORMATION MANAGEMENT | Facility: OTHER | Age: OVER 89
End: 2025-04-11

## 2025-04-11 NOTE — CARE PLAN
Problem: Patient Stated Problem: Deconditioning/retain ability to walk around the block  Goal: Patient Stated Goal: Increase physical activity/exercise-long term   Outcome: Progressing  Note: Re-reviewed physical therapy referral and follow up with PCP for new orders

## 2025-04-11 NOTE — PROGRESS NOTES
Reason for Follow-Up:  Monthly outreach follow up    Current Health Status:  DM2, HTN, CKD 2, atherosclerosis of aorta     Medical Updates:    No recent ER visits or hospitalization     Medication Updates:    No medication changes     Symptoms:    Denies any current complaints or new symptoms      Plan of Care Goals and Progress:    Goal 1. Diabetic education      Progress:  Last A1c on 1/10/24 was 6.3, Iveth reports continues to watch her diet       Barriers:  Age, progression of disease process, knowledge of healthcare, habits      Interventions:  Reviewed next PCP appointment      Education:  Discussed diet and exercise-physical therapy referral. Her main focus today was on on her physical therapy referral, no additional Diabetes education. Will continue at next monthly outreach follow up.     Goal 2. Increase physical activity/exercise      Progress:  Physical therapy referral ordered at last PCP follow up on 3/13, referral denies due to choice of PT office was out of network, will follow up with PCP for new referral       Barriers:  Age, progression of disease process, knowledge of healthcare, habits      Interventions:  Reviewed referral process     Education:  Reviewed referral process, will follow-up with PCP for new referral      Patient's Concerns and Feedback (Self Management of Care):   Iveth called and left a message asking for a return call, return call and completed monthly outreach follow-up.  Iveth discussed her ENT referral and PT referrals and that she has received no information.  Per chart review PT referral was denied due to office of choice being out of network.  Will follow-up with PCP for new referral.  Provided Iveth with the information regarding her ENT referral.  Provided contact phone number and sent [employer] message as well.  Iveth discussed her frustrations with the pharmacy system and the refill process.  Reviewed the refill process with Iveth.  She denied any other needs at  this time.  Encouraged to call with any questions or concerns. Chart reviewed for Quarterly Assessment, patient continues to qualify and would benefit from PCM program.     Next Steps:     Follow-Up Plan:  May 2025      Appointments:  7/24 PCP, 10/16 Rheumatology      Contact Information:  Call [phone] with any questions or concerns.

## 2025-04-15 DIAGNOSIS — G89.29 CHRONIC BILATERAL LOW BACK PAIN WITHOUT SCIATICA: ICD-10-CM

## 2025-04-15 DIAGNOSIS — M81.0 AGE-RELATED OSTEOPOROSIS WITHOUT CURRENT PATHOLOGICAL FRACTURE: ICD-10-CM

## 2025-04-15 DIAGNOSIS — M54.50 CHRONIC BILATERAL LOW BACK PAIN WITHOUT SCIATICA: ICD-10-CM

## 2025-04-15 DIAGNOSIS — M35.3 POLYMYALGIA RHEUMATICA (HCC): ICD-10-CM

## 2025-04-15 DIAGNOSIS — R53.81 PHYSICAL DECONDITIONING: ICD-10-CM

## 2025-04-28 ENCOUNTER — TELEPHONE (OUTPATIENT)
Dept: HEALTH INFORMATION MANAGEMENT | Facility: OTHER | Age: OVER 89
End: 2025-04-28
Payer: MEDICARE

## 2025-04-29 NOTE — TELEPHONE ENCOUNTER
Iveth called reporting difficulties receiving messages from her MyChart.  Directed Iveth to IT help in the MyChart department provided contact numbers.  She verbalized understanding.  Encouraged to call with any questions or concerns

## 2025-05-22 ENCOUNTER — PATIENT OUTREACH (OUTPATIENT)
Dept: HEALTH INFORMATION MANAGEMENT | Facility: OTHER | Age: OVER 89
End: 2025-05-22
Payer: MEDICARE

## 2025-05-22 DIAGNOSIS — N18.2 STAGE 2 CHRONIC KIDNEY DISEASE: ICD-10-CM

## 2025-05-22 DIAGNOSIS — I10 PRIMARY HYPERTENSION: Primary | ICD-10-CM

## 2025-05-22 DIAGNOSIS — M35.3 POLYMYALGIA RHEUMATICA (HCC): ICD-10-CM

## 2025-05-22 DIAGNOSIS — I70.0 ATHEROSCLEROSIS OF AORTA (HCC): ICD-10-CM

## 2025-05-22 DIAGNOSIS — M79.604 BILATERAL LEG PAIN: ICD-10-CM

## 2025-05-22 DIAGNOSIS — I10 HYPERTENSION, UNSPECIFIED TYPE: ICD-10-CM

## 2025-05-22 DIAGNOSIS — M79.605 BILATERAL LEG PAIN: ICD-10-CM

## 2025-05-22 SDOH — ECONOMIC STABILITY: INCOME INSECURITY: IN THE LAST 12 MONTHS, WAS THERE A TIME WHEN YOU WERE NOT ABLE TO PAY THE MORTGAGE OR RENT ON TIME?: NO

## 2025-05-22 SDOH — ECONOMIC STABILITY: FOOD INSECURITY: WITHIN THE PAST 12 MONTHS, YOU WORRIED THAT YOUR FOOD WOULD RUN OUT BEFORE YOU GOT MONEY TO BUY MORE.: NEVER TRUE

## 2025-05-22 SDOH — ECONOMIC STABILITY: FOOD INSECURITY: WITHIN THE PAST 12 MONTHS, THE FOOD YOU BOUGHT JUST DIDN'T LAST AND YOU DIDN'T HAVE MONEY TO GET MORE.: NEVER TRUE

## 2025-05-22 SDOH — HEALTH STABILITY: PHYSICAL HEALTH: ON AVERAGE, HOW MANY MINUTES DO YOU ENGAGE IN EXERCISE AT THIS LEVEL?: 0 MIN

## 2025-05-22 SDOH — HEALTH STABILITY: PHYSICAL HEALTH: ON AVERAGE, HOW MANY DAYS PER WEEK DO YOU ENGAGE IN MODERATE TO STRENUOUS EXERCISE (LIKE A BRISK WALK)?: 0 DAYS

## 2025-05-22 ASSESSMENT — LIFESTYLE VARIABLES
HOW OFTEN DO YOU HAVE SIX OR MORE DRINKS ON ONE OCCASION: NEVER
SKIP TO QUESTIONS 9-10: 1
HOW OFTEN DO YOU HAVE A DRINK CONTAINING ALCOHOL: NEVER
HOW MANY STANDARD DRINKS CONTAINING ALCOHOL DO YOU HAVE ON A TYPICAL DAY: PATIENT DOES NOT DRINK
AUDIT-C TOTAL SCORE: 0

## 2025-05-22 ASSESSMENT — SOCIAL DETERMINANTS OF HEALTH (SDOH)
WITHIN THE LAST YEAR, HAVE YOU BEEN HUMILIATED OR EMOTIONALLY ABUSED IN OTHER WAYS BY YOUR PARTNER OR EX-PARTNER?: NO
DO YOU BELONG TO ANY CLUBS OR ORGANIZATIONS SUCH AS CHURCH GROUPS UNIONS, FRATERNAL OR ATHLETIC GROUPS, OR SCHOOL GROUPS?: YES
HOW OFTEN DO YOU GET TOGETHER WITH FRIENDS OR RELATIVES?: TWICE A WEEK
HOW HARD IS IT FOR YOU TO PAY FOR THE VERY BASICS LIKE FOOD, HOUSING, MEDICAL CARE, AND HEATING?: SOMEWHAT HARD
IN A TYPICAL WEEK, HOW MANY TIMES DO YOU TALK ON THE PHONE WITH FAMILY, FRIENDS, OR NEIGHBORS?: MORE THAN THREE TIMES A WEEK
HOW OFTEN DO YOU ATTENT MEETINGS OF THE CLUB OR ORGANIZATION YOU BELONG TO?: MORE THAN 4 TIMES PER YEAR
HOW OFTEN DO YOU ATTEND CHURCH OR RELIGIOUS SERVICES?: MORE THAN 4 TIMES PER YEAR
WITHIN THE LAST YEAR, HAVE TO BEEN RAPED OR FORCED TO HAVE ANY KIND OF SEXUAL ACTIVITY BY YOUR PARTNER OR EX-PARTNER?: NO
WITHIN THE LAST YEAR, HAVE YOU BEEN AFRAID OF YOUR PARTNER OR EX-PARTNER?: NO
WITHIN THE LAST YEAR, HAVE YOU BEEN KICKED, HIT, SLAPPED, OR OTHERWISE PHYSICALLY HURT BY YOUR PARTNER OR EX-PARTNER?: NO
IN THE PAST 12 MONTHS, HAS THE ELECTRIC, GAS, OIL, OR WATER COMPANY THREATENED TO SHUT OFF SERVICE IN YOUR HOME?: NO

## 2025-05-22 ASSESSMENT — PATIENT HEALTH QUESTIONNAIRE - PHQ9: CLINICAL INTERPRETATION OF PHQ2 SCORE: 0

## 2025-05-22 NOTE — PROGRESS NOTES
Reason for Follow-Up:  Monthly outreach follow up     Current Health Status:  DM2, HTN, CKD 2, atherosclerosis of aorta     Medical Updates:    No recent ER visits or hospitalization     Medication Updates:    No medication changes     Symptoms:    Reports she is sore from lots of activity today     Plan of Care Goals and Progress:    Goal 1. Diabetic education      Progress:  Iveth reports continues to watch her diet, her last A1c  was 6.3 on 1/10/24      Barriers:  Age, progression of disease process, knowledge of healthcare, habits      Interventions:  Reviewed next PCP appointment      Education:  Iveth's main focus was on her new physical therapy evaluation, will continue diabetes education at next monthly outreach     Goal 2. Increase physical activity/exercise      Progress:  Report her low back/hips are sore today from increased activity today       Barriers:  Age, progression of disease process, knowledge of healthcare, habits      Interventions:  Physical therapy scheduled for 5/27     Education:  Discussed current activity level and up coming physical therapy       Patient's Concerns and Feedback (Self Management of Care):   Spoke with Iveth for monthly outreach follow up. She reports she is feeling well except her low back/hips are sore today from increased activity. Reviewed up coming physical therapy evaluation. Reviewed her pain management follow. She will be having an epidural injection next week. Annual Case Review, patient continues to qualify and would benefit from PCM program. Updated Social Determinates of Health, reviewed medication list, and reviewed healthcare goals. She denied any needs at this time. Encouraged to call with any questions or concerns     Next Steps:     Follow-Up Plan:  June 2025      Appointments:  5/27 Physical therapy, 7/24 PCP      Contact Information:  Call [phone] with any questions or concerns.

## 2025-05-23 ENCOUNTER — APPOINTMENT (OUTPATIENT)
Dept: PHYSICAL THERAPY | Facility: REHABILITATION | Age: OVER 89
End: 2025-05-23
Attending: STUDENT IN AN ORGANIZED HEALTH CARE EDUCATION/TRAINING PROGRAM
Payer: MEDICARE

## 2025-05-27 ENCOUNTER — TELEPHONE (OUTPATIENT)
Dept: HEALTH INFORMATION MANAGEMENT | Facility: OTHER | Age: OVER 89
End: 2025-05-27
Payer: MEDICARE

## 2025-05-27 ENCOUNTER — APPOINTMENT (OUTPATIENT)
Dept: PHYSICAL THERAPY | Facility: REHABILITATION | Age: OVER 89
End: 2025-05-27
Attending: STUDENT IN AN ORGANIZED HEALTH CARE EDUCATION/TRAINING PROGRAM
Payer: MEDICARE

## 2025-05-27 NOTE — TELEPHONE ENCOUNTER
Iveth left a message asking for a return call.  Returned call.  Iveth reports she is having stomach/epigastric pain with nausea.  Earlier this morning she felt dizzy and short of breath as well.  She reports a 95% improvement in her symptoms as of now but still continues to have pain and some nausea.  She called to discuss her options for follow-up.  Earlier this morning she contemplated calling 911 her symptoms were so severe.  Encouraged Iveth to call 911 for evaluation of her symptoms, cautioned against driving herself in her current condition.

## 2025-06-12 ENCOUNTER — PHYSICAL THERAPY (OUTPATIENT)
Dept: PHYSICAL THERAPY | Facility: REHABILITATION | Age: OVER 89
End: 2025-06-12
Attending: STUDENT IN AN ORGANIZED HEALTH CARE EDUCATION/TRAINING PROGRAM
Payer: MEDICARE

## 2025-06-12 DIAGNOSIS — M54.50 CHRONIC BILATERAL LOW BACK PAIN WITHOUT SCIATICA: ICD-10-CM

## 2025-06-12 DIAGNOSIS — M35.3 POLYMYALGIA RHEUMATICA (HCC): Primary | ICD-10-CM

## 2025-06-12 DIAGNOSIS — R53.81 PHYSICAL DECONDITIONING: ICD-10-CM

## 2025-06-12 DIAGNOSIS — G89.29 CHRONIC BILATERAL LOW BACK PAIN WITHOUT SCIATICA: ICD-10-CM

## 2025-06-12 DIAGNOSIS — M81.0 AGE-RELATED OSTEOPOROSIS WITHOUT CURRENT PATHOLOGICAL FRACTURE: ICD-10-CM

## 2025-06-12 PROCEDURE — 97110 THERAPEUTIC EXERCISES: CPT

## 2025-06-12 PROCEDURE — 97162 PT EVAL MOD COMPLEX 30 MIN: CPT

## 2025-06-12 ASSESSMENT — ENCOUNTER SYMPTOMS
PAIN SCALE: 1
PAIN SCALE AT HIGHEST: 5
PAIN SCALE AT LOWEST: 0

## 2025-06-12 NOTE — OP THERAPY EVALUATION
"  Outpatient Physical Therapy  INITIAL EVALUATION    [facility] [address]  [address]  [place] [address]  Phone:  [phone]  Fax:  [phone]    Date of Evaluation: 2025    Patient: Iveth Martin  YOB: 1933  MRN: [id number]     Referring Provider: yRan Waterman M.D.  [address] [place] [address] [address]  [place] [address]   Referring Diagnosis Polymyalgia rheumatica (HCC) [M35.3];Chronic bilateral low back pain without sciatica [M54.50, G89.29];Age-related osteoporosis without current pathological fracture [M81.0];Physical deconditioning [R53.81]     Time Calculation  Start time: 0745  Stop time: 0840 Time Calculation (min): 55 minutes         Chief Complaint: No chief complaint on file.    Visit Diagnoses     ICD-10-CM   1. Polymyalgia rheumatica (HCC)  M35.3   2. Chronic bilateral low back pain without sciatica  M54.50    G89.29   3. Age-related osteoporosis without current pathological fracture  M81.0   4. Physical deconditioning  R53.81       Date of onset of impairment: 2024    Subjective   History of Present Illness:     History of chief complaint:  Patient reports continual lbp with progressive bilateral groin \"heaviness\" and ache.  Patient reports that her ability to ambulate is almost unlimited  while shopping with a cart.  Patient reports that she feels her legs weaker over time and patient wants to stay independent  living    Prior level of function:  Live alone kiley duplex    Pain:     Current pain ratin    At best pain ratin    At worst pain ratin    Location:  Bilateral inginuinal line \" tired ache\"--bilateral lbp l4-5    Aggravating factors:  Walk for more than 5' w/o groin or lbp  Sit for more than 10' w/o pain  In/out car with u.e  for lifting R eg out of car or no using handles  LORAINE 16        Past Medical History[1]  Past Surgical History[2]    Precautions:       Objective   Observation and functional movement:  Forward " "trunk lean, decreased arm swing , wide DAVID,    Range of motion and strength:    Bilateral hip ROM WNL except ER R: 2  L: 30    Palpation and joint mobility:     Noted significant enthesits about sacral base, severe TTP bilateral g min/med          Therapeutic Treatments and Modalities:     Therapeutic Treatment and Modalities Summary: KENNY alternating hip extension to fatigue  Bridge x 12\", 25\" ,1\"--hep--hourly  Tall wall spider--hep horuly  Walk with hands above head to engage paraspinals    Time-based treatments/modalities:    Physical Therapy Timed Treatment Charges  Therapeutic exercise minutes (CPT 04860): 25 minutes      Assessment, Response and Plan:   Assessment details:  Patient presents with L/S derangement  syndrome with  poor posture  and poor body awareness with decreased lordosis.   Patient presents with axial load and flexion sensitivities. Patient centralized with aCro extension protocol.   Patient alos presents with noted weak psoterior chain and bilateral g med/min tendinopathy with R>L limitaion in hip external rotation  Patient should do well if compliant with POC.   Prognosis: good    Goals:   Short Term Goals:   Walk for more than 5' w/o groin or lbp  Sit for more than 10' w/o pain  In/out car with u.e  for lifting R eg out of car or no using handles  LORAINE <10      Long Term Goals:    Walk for more than 15' w/o groin or lbp  Sit for more than 30' w/o pain  Up flight of stairs w/ 1 rail  Sit-stand 30\" x >12 reps  LORAINE <5      Functional Assessment Used        Referring provider co-signature:  I have reviewed this plan of care and my co-signature certifies the need for services.    Certification Period: 06/12/2025 to  08/14/25    Physician Signature: ________________________________ Date: ______________               [1]   Past Medical History:  Diagnosis Date    Arthritis     Hyperlipidemia     Hypertension    [2]   Past Surgical History:  Procedure Laterality Date    PB WRIST " ARTHROSCOP,RELEASE XVERS LIG Left 08/11/2021    Procedure: LEFT ENDOSCOPIC CARPAL TUNNEL RELEASE;  Surgeon: Pee Khan M.D.;  Location: [facility];  Service: Orthopedics    PB INCISE FINGER TENDON SHEATH Left 08/11/2021    Procedure: LEFT LONG AND RING TRIGGER FINGER RELEASE;  Surgeon: Pee Khan M.D.;  Location: [facility];  Service: Orthopedics    ABDOMINAL HYSTERECTOMY TOTAL      APPENDECTOMY      BOWEL RESECTION      HERNIA REPAIR      LUMPECTOMY

## 2025-06-17 ENCOUNTER — APPOINTMENT (OUTPATIENT)
Dept: PHYSICAL THERAPY | Facility: REHABILITATION | Age: OVER 89
End: 2025-06-17
Payer: MEDICARE

## 2025-06-25 ENCOUNTER — APPOINTMENT (OUTPATIENT)
Dept: PHYSICAL THERAPY | Facility: REHABILITATION | Age: OVER 89
End: 2025-06-25
Attending: STUDENT IN AN ORGANIZED HEALTH CARE EDUCATION/TRAINING PROGRAM
Payer: MEDICARE

## 2025-06-27 ENCOUNTER — PATIENT OUTREACH (OUTPATIENT)
Dept: HEALTH INFORMATION MANAGEMENT | Facility: OTHER | Age: OVER 89
End: 2025-06-27
Payer: MEDICARE

## 2025-06-27 DIAGNOSIS — N18.2 STAGE 2 CHRONIC KIDNEY DISEASE: ICD-10-CM

## 2025-06-27 DIAGNOSIS — I70.0 ATHEROSCLEROSIS OF AORTA (HCC): ICD-10-CM

## 2025-06-27 DIAGNOSIS — I10 PRIMARY HYPERTENSION: Primary | ICD-10-CM

## 2025-06-27 DIAGNOSIS — I10 HYPERTENSION, UNSPECIFIED TYPE: ICD-10-CM

## 2025-06-28 DIAGNOSIS — J44.9 CHRONIC OBSTRUCTIVE PULMONARY DISEASE, UNSPECIFIED COPD TYPE (HCC): ICD-10-CM

## 2025-06-30 RX ORDER — FLUTICASONE FUROATE 100 UG/1
1 POWDER RESPIRATORY (INHALATION)
Qty: 30 EACH | Refills: 6 | Status: SHIPPED | OUTPATIENT
Start: 2025-06-30

## 2025-07-01 PROCEDURE — 99490 CHRNC CARE MGMT STAFF 1ST 20: CPT

## 2025-07-01 PROCEDURE — 99439 CHRNC CARE MGMT STAF EA ADDL: CPT

## 2025-07-01 NOTE — PROGRESS NOTES
Reason for Follow-Up:  Monthly outreach follow up     Current Health Status:  DM2, HTN, CKD 2, atherosclerosis of aorta     Medical Updates:    No recent ER visits or hospitalization     Medication Updates:    No medication changes     Symptoms:    Iveth reports she is feeling well.     Plan of Care Goals and Progress:    Goal 1. Diabetes Education      Progress:  Iveth's focus today was on her follow up with GI.       Barriers:  Age, progression of disease process, knowledge of healthcare, habits      Interventions:  Reviewed next PCP follow up      Education:  Will continue diabetes education at next monthly outreach     Goal 2. Increase physical activity/exercise      Progress:  Iveth reports she attended her initial Physical Therapy evaluation. She felt that doing exercises at home would be better for her than coming regularly scheduled PT sessions.       Barriers:  Age, progression of disease process, knowledge of healthcare, habits      Interventions:  Reviewed initial Physical therapy evaluation     Education:  Encouraged home physical therapy exercises/home program      Patient's Concerns and Feedback (Self Management of Care):   Spoke with Iveth for monthly outreach follow up. She reports she is feeling well.  Iveth discussed the follow-up of her abdominal pain episode on May 27.  She was in contact with her GI office and an abdominal ultrasound was ordered. Her ultrasound is scheduled for 7/15. Iveth reports follow-up with GI as scheduled for a few weeks later.  Reviewed goal of increasing physical activity as noted above.  Iveth denies any other needs at this time.  Encouraged to call with any questions or concerns    Next Steps:     Follow-Up Plan:  July 2025      Appointments:  7/15 Radiology, 7/24 PCP     Contact Information:  Call [phone] with any questions or concerns.

## 2025-07-01 NOTE — CARE PLAN
Problem: Patient Stated Problem: Deconditioning/retain ability to walk around the block  Goal: Patient Stated Goal: Increase physical activity/exercise-long term   Outcome: Met  Intervention: Second Patient Stated Intervention: Continue home exercise program developed by physical therapy   Note: Iveth attended her initial Physical Therapy evaluation, she felt that doing exercises at home would be better for her than coming regularly scheduled PT sessions.

## 2025-07-15 ENCOUNTER — APPOINTMENT (OUTPATIENT)
Dept: RADIOLOGY | Facility: MEDICAL CENTER | Age: OVER 89
End: 2025-07-15
Attending: INTERNAL MEDICINE
Payer: MEDICARE

## 2025-07-15 DIAGNOSIS — R10.10 PAIN OF UPPER ABDOMEN: ICD-10-CM

## 2025-07-15 PROCEDURE — 76700 US EXAM ABDOM COMPLETE: CPT

## 2025-07-24 ENCOUNTER — PATIENT OUTREACH (OUTPATIENT)
Dept: HEALTH INFORMATION MANAGEMENT | Facility: OTHER | Age: OVER 89
End: 2025-07-24

## 2025-07-24 ENCOUNTER — OFFICE VISIT (OUTPATIENT)
Dept: MEDICAL GROUP | Facility: MEDICAL CENTER | Age: OVER 89
End: 2025-07-24
Payer: MEDICARE

## 2025-07-24 VITALS
OXYGEN SATURATION: 95 % | RESPIRATION RATE: 18 BRPM | HEART RATE: 81 BPM | HEIGHT: 62 IN | SYSTOLIC BLOOD PRESSURE: 102 MMHG | TEMPERATURE: 96.5 F | DIASTOLIC BLOOD PRESSURE: 62 MMHG | BODY MASS INDEX: 32.74 KG/M2 | WEIGHT: 177.91 LBS

## 2025-07-24 DIAGNOSIS — R73.03 PREDIABETES: ICD-10-CM

## 2025-07-24 DIAGNOSIS — I10 PRIMARY HYPERTENSION: Primary | ICD-10-CM

## 2025-07-24 DIAGNOSIS — N18.2 STAGE 2 CHRONIC KIDNEY DISEASE: ICD-10-CM

## 2025-07-24 DIAGNOSIS — R73.03 PREDIABETES: Primary | ICD-10-CM

## 2025-07-24 DIAGNOSIS — I70.0 ATHEROSCLEROSIS OF AORTA (HCC): ICD-10-CM

## 2025-07-24 DIAGNOSIS — I10 PRIMARY HYPERTENSION: ICD-10-CM

## 2025-07-24 DIAGNOSIS — G47.00 INSOMNIA, UNSPECIFIED TYPE: ICD-10-CM

## 2025-07-24 DIAGNOSIS — E78.00 HYPERCHOLESTEROLEMIA: ICD-10-CM

## 2025-07-24 DIAGNOSIS — K80.20 CALCULUS OF GALLBLADDER WITHOUT CHOLECYSTITIS WITHOUT OBSTRUCTION: ICD-10-CM

## 2025-07-24 PROCEDURE — 99214 OFFICE O/P EST MOD 30 MIN: CPT | Performed by: STUDENT IN AN ORGANIZED HEALTH CARE EDUCATION/TRAINING PROGRAM

## 2025-07-24 PROCEDURE — 3078F DIAST BP <80 MM HG: CPT | Performed by: STUDENT IN AN ORGANIZED HEALTH CARE EDUCATION/TRAINING PROGRAM

## 2025-07-24 PROCEDURE — 3074F SYST BP LT 130 MM HG: CPT | Performed by: STUDENT IN AN ORGANIZED HEALTH CARE EDUCATION/TRAINING PROGRAM

## 2025-07-24 RX ORDER — ZOLPIDEM TARTRATE 5 MG/1
5 TABLET ORAL NIGHTLY PRN
Qty: 90 TABLET | Refills: 1 | Status: SHIPPED | OUTPATIENT
Start: 2025-07-24 | End: 2026-01-20

## 2025-07-24 RX ORDER — LOSARTAN POTASSIUM 25 MG/1
25 TABLET ORAL
Qty: 100 TABLET | Refills: 3 | Status: SHIPPED | OUTPATIENT
Start: 2025-07-24

## 2025-07-24 ASSESSMENT — FIBROSIS 4 INDEX: FIB4 SCORE: 1

## 2025-07-24 ASSESSMENT — ENCOUNTER SYMPTOMS
PALPITATIONS: 0
FEVER: 0
WEIGHT LOSS: 0
VOMITING: 0
SHORTNESS OF BREATH: 0
CHILLS: 0
NAUSEA: 0
HEADACHES: 0
WHEEZING: 0
DIZZINESS: 0

## 2025-07-24 NOTE — PROGRESS NOTES
Reason for Follow-Up:  Monthly outreach follow up     Current Health Status:  DM2, HTN, CKD 2, atherosclerosis of aorta     Medical Updates:    No recent ER visits or hospitalization     Medication Updates:    Decrease Losartan to 25mg dialy     Symptoms:    Iveth state she is feeling well today but felt faituged yesterday     Plan of Care Goals and Progress:    Goal 1. Diabetes Education      Progress:  Last A1c was 6.3 on 1/10      Barriers:  Advanced age, progression of disease process, knowledge of healthcare, habits      Interventions:  Reviewed next PCP follow up     Education:  Discussed healthy diet. Provided written resources on diabetes education     Goal 2. HTN Education      Progress:  Her blood pressure in office today was 102/62.      Barriers:  Advanced age, progression of disease process, knowledge of healthcare, habits      Interventions:  Reviewed PCP recommendation to reduce Losartan to 25mg daily       Education:  Discussed monitoring blood pressure at home and keeping a log, especially in the setting of recent BP and medication changes. Provided the contact number to [facility] for coverage of a blood pressure cuff.       Patient's Concerns and Feedback (Self Management of Care):   Met Iveth in the clinic after her PCP follow up. Reviewed Diabetes and HTN education as above. Iveth denied any other needs at this time. Encouraged to call with any questions or concerns. Quarterly assessment completed: patient continues to qualify and would benefit from PCM program. Reviewed with current priorities and healthcare goals, continue plan of care until goals are met.    Next Steps:     Follow-Up Plan:  August 2025      Appointments:  10/16 Rheumatology, 10/23 PCP       Contact Information:  Call [phone] with any questions or concerns.
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
## 2025-07-24 NOTE — CARE PLAN
Problem: Patient barriers to managing high blood pressure  Goal: Identify barriers to managing blood pressure/long term   Outcome: Progressing  Note: Patient has vocalized commitment to lessening the barriers of obtaining equipment, needs a blood pressure cuff. Provided with the number to [facility] to coverage of home blood pressure cuff.   Intervention: Identify patient barriers to managing high blood pressure  Intervention: Identify with patient what the barriers are to self-management of blood pressure     Problem: Knowledge deficit of self-monitoring blood pressure  Goal: Demonstrate the elements of self-monitoring blood pressure.short term   Outcome: Progressing  Note:   -Patient has verbalized commitment to self-monitoring blood pressure at least once a week.    -Patient will log home blood pressures and bring the log to medical provider appointments.    Intervention: Educate on importance of self-monitoring blood pressure  Intervention: Educate on proper technique of self-monitoring blood pressure  Intervention: Educate on importance of keeping a home blood pressure log  Intervention: Educate on bringing log to medical provider appointments

## 2025-07-29 ENCOUNTER — HOSPITAL ENCOUNTER (OUTPATIENT)
Dept: LAB | Facility: MEDICAL CENTER | Age: OVER 89
End: 2025-07-29
Attending: STUDENT IN AN ORGANIZED HEALTH CARE EDUCATION/TRAINING PROGRAM
Payer: MEDICARE

## 2025-07-29 DIAGNOSIS — N18.2 STAGE 2 CHRONIC KIDNEY DISEASE: ICD-10-CM

## 2025-07-29 DIAGNOSIS — E78.00 HYPERCHOLESTEROLEMIA: ICD-10-CM

## 2025-07-29 DIAGNOSIS — R73.03 PREDIABETES: ICD-10-CM

## 2025-07-29 DIAGNOSIS — I10 PRIMARY HYPERTENSION: ICD-10-CM

## 2025-07-29 LAB
25(OH)D3 SERPL-MCNC: 52 NG/ML (ref 30–100)
ALBUMIN SERPL BCP-MCNC: 4 G/DL (ref 3.2–4.9)
ALBUMIN/GLOB SERPL: 1.8 G/DL
ALP SERPL-CCNC: 65 U/L (ref 30–99)
ALT SERPL-CCNC: 19 U/L (ref 2–50)
ANION GAP SERPL CALC-SCNC: 13 MMOL/L (ref 7–16)
AST SERPL-CCNC: 29 U/L (ref 12–45)
BILIRUB SERPL-MCNC: 0.8 MG/DL (ref 0.1–1.5)
BUN SERPL-MCNC: 19 MG/DL (ref 8–22)
CALCIUM ALBUM COR SERPL-MCNC: 9.8 MG/DL (ref 8.5–10.5)
CALCIUM SERPL-MCNC: 9.8 MG/DL (ref 8.5–10.5)
CHLORIDE SERPL-SCNC: 101 MMOL/L (ref 96–112)
CHOLEST SERPL-MCNC: 180 MG/DL (ref 100–199)
CO2 SERPL-SCNC: 26 MMOL/L (ref 20–33)
CREAT SERPL-MCNC: 1.03 MG/DL (ref 0.5–1.4)
CREAT UR-MCNC: 87.7 MG/DL
ERYTHROCYTE [DISTWIDTH] IN BLOOD BY AUTOMATED COUNT: 46.8 FL (ref 35.9–50)
EST. AVERAGE GLUCOSE BLD GHB EST-MCNC: 143 MG/DL
GFR SERPLBLD CREATININE-BSD FMLA CKD-EPI: 51 ML/MIN/1.73 M 2
GLOBULIN SER CALC-MCNC: 2.2 G/DL (ref 1.9–3.5)
GLUCOSE SERPL-MCNC: 110 MG/DL (ref 65–99)
HBA1C MFR BLD: 6.6 % (ref 4–5.6)
HCT VFR BLD AUTO: 43.7 % (ref 37–47)
HDLC SERPL-MCNC: 53 MG/DL
HGB BLD-MCNC: 14.7 G/DL (ref 12–16)
LDLC SERPL CALC-MCNC: 101 MG/DL
MCH RBC QN AUTO: 30.3 PG (ref 27–33)
MCHC RBC AUTO-ENTMCNC: 33.6 G/DL (ref 32.2–35.5)
MCV RBC AUTO: 90.1 FL (ref 81.4–97.8)
MICROALBUMIN UR-MCNC: <1.2 MG/DL
MICROALBUMIN/CREAT UR: NORMAL MG/G (ref 0–30)
PLATELET # BLD AUTO: 356 K/UL (ref 164–446)
PMV BLD AUTO: 10.5 FL (ref 9–12.9)
POTASSIUM SERPL-SCNC: 3.7 MMOL/L (ref 3.6–5.5)
PROT SERPL-MCNC: 6.2 G/DL (ref 6–8.2)
RBC # BLD AUTO: 4.85 M/UL (ref 4.2–5.4)
SODIUM SERPL-SCNC: 140 MMOL/L (ref 135–145)
TRIGL SERPL-MCNC: 132 MG/DL (ref 0–149)
TSH SERPL DL<=0.005 MIU/L-ACNC: 1.28 UIU/ML (ref 0.38–5.33)
WBC # BLD AUTO: 10.3 K/UL (ref 4.8–10.8)

## 2025-07-29 PROCEDURE — 80061 LIPID PANEL: CPT

## 2025-07-29 PROCEDURE — 82306 VITAMIN D 25 HYDROXY: CPT

## 2025-07-29 PROCEDURE — 82043 UR ALBUMIN QUANTITATIVE: CPT

## 2025-07-29 PROCEDURE — 80053 COMPREHEN METABOLIC PANEL: CPT

## 2025-07-29 PROCEDURE — 84443 ASSAY THYROID STIM HORMONE: CPT

## 2025-07-29 PROCEDURE — 82570 ASSAY OF URINE CREATININE: CPT

## 2025-07-29 PROCEDURE — 36415 COLL VENOUS BLD VENIPUNCTURE: CPT

## 2025-07-29 PROCEDURE — 85027 COMPLETE CBC AUTOMATED: CPT

## 2025-07-29 PROCEDURE — 83036 HEMOGLOBIN GLYCOSYLATED A1C: CPT

## 2025-07-31 RX ORDER — ALBUTEROL SULFATE 90 UG/1
2 INHALANT RESPIRATORY (INHALATION) EVERY 6 HOURS PRN
Qty: 8.5 EACH | Refills: 11 | Status: SHIPPED | OUTPATIENT
Start: 2025-07-31

## 2025-08-14 ENCOUNTER — PATIENT OUTREACH (OUTPATIENT)
Dept: HEALTH INFORMATION MANAGEMENT | Facility: OTHER | Age: OVER 89
End: 2025-08-14
Payer: MEDICARE

## 2025-08-14 DIAGNOSIS — R73.03 PREDIABETES: ICD-10-CM

## 2025-08-14 DIAGNOSIS — N18.2 STAGE 2 CHRONIC KIDNEY DISEASE: ICD-10-CM

## 2025-08-14 DIAGNOSIS — I10 PRIMARY HYPERTENSION: Primary | ICD-10-CM

## 2025-08-14 DIAGNOSIS — I70.0 ATHEROSCLEROSIS OF AORTA (HCC): ICD-10-CM
